# Patient Record
Sex: MALE | Race: WHITE | NOT HISPANIC OR LATINO | Employment: PART TIME | URBAN - METROPOLITAN AREA
[De-identification: names, ages, dates, MRNs, and addresses within clinical notes are randomized per-mention and may not be internally consistent; named-entity substitution may affect disease eponyms.]

---

## 2023-03-08 PROBLEM — M25.511 ARTHRALGIA OF RIGHT ACROMIOCLAVICULAR JOINT: Status: ACTIVE | Noted: 2023-03-08

## 2023-03-08 PROBLEM — E53.8 VITAMIN B12 DEFICIENCY: Status: ACTIVE | Noted: 2023-03-08

## 2023-03-08 PROBLEM — Q65.89 HIP DYSPLASIA (HHS-HCC): Status: ACTIVE | Noted: 2023-03-08

## 2023-03-08 PROBLEM — G82.54: Status: ACTIVE | Noted: 2023-03-08

## 2023-03-08 PROBLEM — J98.4 RESTRICTIVE LUNG DISEASE: Status: ACTIVE | Noted: 2023-03-08

## 2023-03-08 PROBLEM — G82.50: Status: ACTIVE | Noted: 2023-03-08

## 2023-03-08 PROBLEM — M41.40 NEUROMUSCULAR SCOLIOSIS: Status: ACTIVE | Noted: 2023-03-08

## 2023-03-08 PROBLEM — Z98.1 H/O SPINAL FUSION: Status: ACTIVE | Noted: 2023-03-08

## 2023-03-08 PROBLEM — G72.9 CERVICAL MYOPATHY: Status: ACTIVE | Noted: 2023-03-08

## 2023-03-08 PROBLEM — I61.9: Status: ACTIVE | Noted: 2023-03-08

## 2023-03-08 PROBLEM — R94.2 ABNORMAL PFT: Status: ACTIVE | Noted: 2023-03-08

## 2023-03-08 PROBLEM — D64.9 ANEMIA: Status: ACTIVE | Noted: 2023-03-08

## 2023-03-08 PROBLEM — M41.50 SYNDROMIC SCOLIOSIS: Status: ACTIVE | Noted: 2023-03-08

## 2023-03-08 PROBLEM — R05.9 COUGH: Status: ACTIVE | Noted: 2023-03-08

## 2023-03-08 PROBLEM — M24.9 AC JOINT DERANGEMENT: Status: ACTIVE | Noted: 2023-03-08

## 2023-03-08 PROBLEM — G70.9 NEUROMUSCULAR WEAKNESS (MULTI): Status: ACTIVE | Noted: 2023-03-08

## 2023-03-08 PROBLEM — J45.30 ASTHMA, MILD PERSISTENT (HHS-HCC): Status: ACTIVE | Noted: 2023-03-08

## 2023-03-08 PROBLEM — L91.8 FIBROEPITHELIAL PAPILLOMA: Status: ACTIVE | Noted: 2023-03-08

## 2023-03-08 PROBLEM — N31.9 NEUROGENIC BLADDER: Status: ACTIVE | Noted: 2023-03-08

## 2023-03-08 PROBLEM — R60.9 EDEMA: Status: ACTIVE | Noted: 2023-03-08

## 2023-03-08 PROBLEM — Z93.1 S/P NISSEN FUNDOPLICATION (WITH GASTROSTOMY TUBE PLACEMENT) (MULTI): Status: ACTIVE | Noted: 2023-03-08

## 2023-03-08 PROBLEM — Z93.52: Status: ACTIVE | Noted: 2023-03-08

## 2023-03-08 PROBLEM — R60.0 BILATERAL EDEMA OF LOWER EXTREMITY: Status: ACTIVE | Noted: 2023-03-08

## 2023-03-08 PROBLEM — N39.0 RECURRENT UTI: Status: ACTIVE | Noted: 2023-03-08

## 2023-03-08 PROBLEM — K59.2 NEUROGENIC BOWEL: Status: ACTIVE | Noted: 2023-03-08

## 2023-03-08 PROBLEM — B99.9 INTRA-ABDOMINAL INFECTION: Status: ACTIVE | Noted: 2023-03-08

## 2023-03-08 PROBLEM — K21.9 GASTROESOPHAGEAL REFLUX: Status: ACTIVE | Noted: 2023-03-08

## 2023-03-08 PROBLEM — G81.93: Status: ACTIVE | Noted: 2023-03-08

## 2023-03-08 PROBLEM — R25.8 CLONUS: Status: ACTIVE | Noted: 2023-03-08

## 2023-03-08 PROBLEM — G95.89 MYELOMALACIA (MULTI): Status: ACTIVE | Noted: 2023-03-08

## 2023-03-08 PROBLEM — T14.8XXA BRUISING: Status: ACTIVE | Noted: 2023-03-08

## 2023-03-08 PROBLEM — R25.2 SPASTICITY: Status: ACTIVE | Noted: 2023-03-08

## 2023-03-08 PROBLEM — R33.9 URINARY RETENTION: Status: ACTIVE | Noted: 2023-03-08

## 2023-03-08 PROBLEM — E55.9 VITAMIN D DEFICIENCY: Status: ACTIVE | Noted: 2023-03-08

## 2023-03-08 RX ORDER — ALBUTEROL SULFATE 0.83 MG/ML
SOLUTION RESPIRATORY (INHALATION)
COMMUNITY
Start: 2015-01-16 | End: 2024-02-01 | Stop reason: SDUPTHER

## 2023-03-08 RX ORDER — MUPIROCIN 20 MG/G
OINTMENT TOPICAL
COMMUNITY
Start: 2022-10-14 | End: 2023-10-10 | Stop reason: ALTCHOICE

## 2023-03-08 RX ORDER — LANOLIN ALCOHOL/MO/W.PET/CERES
CREAM (GRAM) TOPICAL
COMMUNITY
Start: 2022-06-17

## 2023-03-08 RX ORDER — ACETAMINOPHEN 500 MG
TABLET ORAL
COMMUNITY
Start: 2022-06-17

## 2023-03-08 RX ORDER — MONTELUKAST SODIUM 10 MG/1
1 TABLET ORAL DAILY
COMMUNITY
Start: 2016-04-22 | End: 2024-02-01 | Stop reason: SDUPTHER

## 2023-03-08 RX ORDER — OXYBUTYNIN CHLORIDE 15 MG/1
1 TABLET, EXTENDED RELEASE ORAL DAILY
COMMUNITY
Start: 2017-02-20 | End: 2024-03-26 | Stop reason: SDUPTHER

## 2023-03-08 RX ORDER — DOCUSATE SODIUM 100 MG/1
1 CAPSULE, LIQUID FILLED ORAL DAILY
COMMUNITY
End: 2024-01-08 | Stop reason: SDUPTHER

## 2023-03-08 RX ORDER — BUDESONIDE AND FORMOTEROL FUMARATE DIHYDRATE 160; 4.5 UG/1; UG/1
AEROSOL RESPIRATORY (INHALATION)
COMMUNITY
Start: 2022-05-12

## 2023-03-08 RX ORDER — POLYETHYLENE GLYCOL 3350 17 G/17G
POWDER, FOR SOLUTION ORAL
COMMUNITY
Start: 2018-05-08 | End: 2024-01-08 | Stop reason: SDUPTHER

## 2023-03-08 RX ORDER — FAMOTIDINE 40 MG/1
TABLET, FILM COATED ORAL
COMMUNITY
Start: 2020-02-21 | End: 2023-12-11 | Stop reason: SDUPTHER

## 2023-03-13 ENCOUNTER — APPOINTMENT (OUTPATIENT)
Dept: PRIMARY CARE | Facility: CLINIC | Age: 23
End: 2023-03-13
Payer: COMMERCIAL

## 2023-03-25 LAB — URINE CULTURE: NORMAL

## 2023-04-13 ENCOUNTER — PATIENT OUTREACH (OUTPATIENT)
Dept: CARE COORDINATION | Facility: CLINIC | Age: 23
End: 2023-04-13
Payer: COMMERCIAL

## 2023-04-13 RX ORDER — AMOXICILLIN AND CLAVULANATE POTASSIUM 875; 125 MG/1; MG/1
875 TABLET, FILM COATED ORAL 2 TIMES DAILY
COMMUNITY
End: 2023-10-10 | Stop reason: ALTCHOICE

## 2023-04-13 NOTE — PROGRESS NOTES
Discharge Facility: [facility] [place]  Discharge Diagnosis: abscess of skin  Admission Date: 4/8/2023  Discharge Date: 4/12/2023    PCP appt: Pt declines scheduling at this time.    Engagement  Call Start Time: 1135 (4/13/2023 11:35 AM)    Medications  Medications reviewed with patient/caregiver?: Yes (new prescription reviewed) (4/13/2023 11:35 AM)  Is the patient having any side effects they believe may be caused by any medication additions or changes?: No (4/13/2023 11:35 AM)  Does the patient have all medications ordered at discharge?: Yes (4/13/2023 11:35 AM)  Care Management Interventions: No intervention needed (4/13/2023 11:35 AM)  Is the patient taking all medications as directed (includes completed medication regime)?: Yes (4/13/2023 11:35 AM)    Appointments  Does the patient have a primary care provider?: Yes (4/13/2023 11:35 AM)  Care Management Interventions: Advised patient to make appointment (4/13/2023 11:35 AM)  Has the patient kept scheduled appointments due by today?: Yes (4/13/2023 11:35 AM)  Care Management Interventions: Advised to schedule with specialist (4/13/2023 11:35 AM)    Self Management  Has home health visited the patient within 72 hours of discharge?: Not applicable (4/13/2023 11:35 AM)    Patient Teaching  Does the patient have access to their discharge instructions?: Yes (4/13/2023 11:35 AM)  Care Management Interventions: Reviewed instructions with patient (4/13/2023 11:35 AM)  What is the patient's perception of their health status since discharge?: Improving (4/13/2023 11:35 AM)  Is the patient/caregiver able to teach back the hierarchy of who to call/visit for symptoms/problems? PCP, Specialist, Home Health nurse, Urgent Care, ED, 911: Yes (4/13/2023 11:35 AM)    Wrap Up  Call End Time: 1137 (4/13/2023 11:35 AM)

## 2023-04-19 ENCOUNTER — OFFICE VISIT (OUTPATIENT)
Dept: PRIMARY CARE | Facility: CLINIC | Age: 23
End: 2023-04-19
Payer: COMMERCIAL

## 2023-04-19 VITALS
HEIGHT: 72 IN | BODY MASS INDEX: 26.9 KG/M2 | DIASTOLIC BLOOD PRESSURE: 51 MMHG | OXYGEN SATURATION: 97 % | RESPIRATION RATE: 16 BRPM | TEMPERATURE: 98 F | HEART RATE: 70 BPM | WEIGHT: 198.6 LBS | SYSTOLIC BLOOD PRESSURE: 92 MMHG

## 2023-04-19 DIAGNOSIS — G82.50 QUADRIPLEGIA AND QUADRIPARESIS (MULTI): ICD-10-CM

## 2023-04-19 DIAGNOSIS — G70.9 NEUROMUSCULAR WEAKNESS (MULTI): ICD-10-CM

## 2023-04-19 DIAGNOSIS — B99.9 INTRA-ABDOMINAL INFECTION: ICD-10-CM

## 2023-04-19 DIAGNOSIS — G82.54: ICD-10-CM

## 2023-04-19 DIAGNOSIS — E83.51 HYPOCALCEMIA: ICD-10-CM

## 2023-04-19 DIAGNOSIS — E87.6 HYPOKALEMIA: ICD-10-CM

## 2023-04-19 DIAGNOSIS — A41.9 SEPSIS, DUE TO UNSPECIFIED ORGANISM, UNSPECIFIED WHETHER ACUTE ORGAN DYSFUNCTION PRESENT (MULTI): ICD-10-CM

## 2023-04-19 DIAGNOSIS — N31.9 NEUROGENIC BLADDER: ICD-10-CM

## 2023-04-19 DIAGNOSIS — N39.0 RECURRENT UTI: ICD-10-CM

## 2023-04-19 DIAGNOSIS — E83.42 HYPOMAGNESEMIA: ICD-10-CM

## 2023-04-19 DIAGNOSIS — G80.0 SPASTIC QUADRIPLEGIC CEREBRAL PALSY (MULTI): Primary | ICD-10-CM

## 2023-04-19 DIAGNOSIS — Q65.89 HIP DYSPLASIA (HHS-HCC): ICD-10-CM

## 2023-04-19 PROBLEM — N39.45 URINARY INCONTINENCE WITH CONTINUOUS LEAKAGE: Status: ACTIVE | Noted: 2023-04-19

## 2023-04-19 PROBLEM — K59.09 CHRONIC CONSTIPATION: Status: ACTIVE | Noted: 2023-04-19

## 2023-04-19 PROBLEM — L02.91 ABSCESS: Status: ACTIVE | Noted: 2023-04-19

## 2023-04-19 PROBLEM — N32.81 DETRUSOR HYPERREFLEXIA: Status: ACTIVE | Noted: 2023-04-19

## 2023-04-19 PROCEDURE — 99495 TRANSJ CARE MGMT MOD F2F 14D: CPT | Performed by: INTERNAL MEDICINE

## 2023-04-19 PROCEDURE — 1036F TOBACCO NON-USER: CPT | Performed by: INTERNAL MEDICINE

## 2023-04-19 RX ORDER — AMIKACIN SULFATE 250 MG/ML
15 INJECTION, SOLUTION INTRAMUSCULAR; INTRAVENOUS
COMMUNITY
End: 2023-10-12 | Stop reason: ALTCHOICE

## 2023-04-19 ASSESSMENT — PAIN SCALES - GENERAL: PAINLEVEL: 0-NO PAIN

## 2023-04-19 NOTE — PROGRESS NOTES
Subjective   Jose Fernandez is a 23 y.o. male who presents for Hospital Follow-up.      Admit date: 4.8.23 - 4.12.23  Admission diagnosis: abscess buttox and abdomen  Additional comments: chest xr, abdomen CT, labs and US   Patient states that bilateral ears have been bothering him- says they feel clogged a little   Patient states that he feels great since getting out of hospital     March 6th went to the UTI and sepsis.  Hospitalized for one week.    Sore started around the 6th of April and it popped on the 7th and went to bathroom and shower then on the 8th it was large and down his thigh.  Had chills, and had a temperature of 104.   CT showed a large abdominal abscess and started on vancomycin and mom took him to [place].  Admitted for ultrasound and buttocks had multiple areas of abscess and bedside I & D did not help.  Went to the floor and IR took their time and put him on Vanco.  Two days later the abscess in the stomach was smaller and cultures from the buttocks were Staph.   Switched to Zosyn and went home on Augmentin.    Seen by surgery Monday (17th)and a drain was placed and he seems to be responding to this drainage.  ID is scheduled for Tuesday the 26 th and blood work is pending.  Concern for infected Urinary system, rods/ortho hardware, baclofen pump. Urine changed to pink creamy color and unsure why.          Review of Systems   All other systems reviewed and are negative.      Objective   BP 92/51   Pulse 70   Temp 36.7 °C (98 °F)   Resp 16   Ht 1.829 m (6')   Wt 90.1 kg (198 lb 9.6 oz)   SpO2 97%   BMI 26.94 kg/m²       Physical Exam  Vitals and nursing note reviewed. Exam conducted with a chaperone present.   Constitutional:       Appearance: Normal appearance.      Comments: Examined I his wheelchair.   HENT:      Head: Normocephalic.   Eyes:      Conjunctiva/sclera: Conjunctivae normal.   Cardiovascular:      Rate and Rhythm: Normal rate and regular rhythm.   Pulmonary:      Effort:  Pulmonary effort is normal.      Breath sounds: Normal breath sounds.   Musculoskeletal:      Cervical back: Neck supple.   Skin:     General: Skin is warm and dry.   Neurological:      Mental Status: He is alert.      Motor: Weakness and atrophy present.      Comments: Distal UE and Lower extremities.         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    Relevant Orders    Vitamin D 25-Hydroxy,Total (Completed)    Vitamin K    Vitamin B12 (Completed)    Comprehensive metabolic panel (Completed)    PTH-Related Peptide       Digestive    Intra-abdominal infection       Genitourinary    Neurogenic bladder    Recurrent UTI       Musculoskeletal    Hip dysplasia    Relevant Orders    Vitamin B12 (Completed)    Comprehensive metabolic panel (Completed)       Immune    Sepsis (CMS/HCC)    Relevant Medications    amikacin (Amikin) 1,000 mg/4 mL solution injection    Other Relevant Orders    Vitamin D 25-Hydroxy,Total (Completed)    Vitamin K    Vitamin B12 (Completed)    Comprehensive metabolic panel (Completed)    Immunoglobulins, IgG, IgA, IgM (Completed)     Other Visit Diagnoses       Hypokalemia        Relevant Orders    Vitamin K    Hypomagnesemia        Relevant Orders    Vitamin D 25-Hydroxy,Total (Completed)    Vitamin K    Hypocalcemia        Relevant Orders    Vitamin D 25-Hydroxy,Total (Completed)    Vitamin K    PTH-Related Peptide          Encounter Diagnoses   Name Primary?    Spastic quadriplegic cerebral palsy (CMS/HCC) Yes    Neuromuscular weakness (CMS/HCC)     Hip dysplasia     Quadriplegia and quadriparesis (CMS/HCC)     Hypokalemia      Hypomagnesemia     Hypocalcemia     Sepsis, due to unspecified organism, unspecified whether acute organ dysfunction present (CMS/HCC)     C5-C7 incomplete quadriplegia (CMS/HCC)     Intra-abdominal infection     Neurogenic bladder     Recurrent UTI      Reviewed hospital discharge records for through the inpatient as well as [facility].      Troy Chiang DO

## 2023-04-20 ENCOUNTER — LAB (OUTPATIENT)
Dept: LAB | Facility: LAB | Age: 23
End: 2023-04-20
Payer: COMMERCIAL

## 2023-04-20 DIAGNOSIS — E83.51 HYPOCALCEMIA: ICD-10-CM

## 2023-04-20 DIAGNOSIS — E87.6 HYPOKALEMIA: ICD-10-CM

## 2023-04-20 DIAGNOSIS — G70.9 NEUROMUSCULAR WEAKNESS (MULTI): ICD-10-CM

## 2023-04-20 DIAGNOSIS — Q65.89 HIP DYSPLASIA (HHS-HCC): ICD-10-CM

## 2023-04-20 DIAGNOSIS — G82.50 QUADRIPLEGIA AND QUADRIPARESIS (MULTI): ICD-10-CM

## 2023-04-20 DIAGNOSIS — E83.42 HYPOMAGNESEMIA: ICD-10-CM

## 2023-04-20 DIAGNOSIS — G80.0 SPASTIC QUADRIPLEGIC CEREBRAL PALSY (MULTI): ICD-10-CM

## 2023-04-20 DIAGNOSIS — A41.9 SEPSIS, DUE TO UNSPECIFIED ORGANISM, UNSPECIFIED WHETHER ACUTE ORGAN DYSFUNCTION PRESENT (MULTI): ICD-10-CM

## 2023-04-20 LAB
ALANINE AMINOTRANSFERASE (SGPT) (U/L) IN SER/PLAS: 15 U/L (ref 10–52)
ALBUMIN (G/DL) IN SER/PLAS: 4 G/DL (ref 3.4–5)
ALKALINE PHOSPHATASE (U/L) IN SER/PLAS: 76 U/L (ref 33–120)
ANION GAP IN SER/PLAS: 12 MMOL/L (ref 10–20)
APPEARANCE, URINE: ABNORMAL
ASPARTATE AMINOTRANSFERASE (SGOT) (U/L) IN SER/PLAS: 15 U/L (ref 9–39)
BACTERIA, URINE: ABNORMAL /HPF
BASOPHILS (10*3/UL) IN BLOOD BY AUTOMATED COUNT: 0.06 X10E9/L (ref 0–0.1)
BASOPHILS/100 LEUKOCYTES IN BLOOD BY AUTOMATED COUNT: 0.8 % (ref 0–2)
BILIRUBIN TOTAL (MG/DL) IN SER/PLAS: 0.7 MG/DL (ref 0–1.2)
BILIRUBIN, URINE: NEGATIVE
BLOOD, URINE: NEGATIVE
C REACTIVE PROTEIN (MG/L) IN SER/PLAS: 0.69 MG/DL
CALCIDIOL (25 OH VITAMIN D3) (NG/ML) IN SER/PLAS: 30 NG/ML
CALCIUM (MG/DL) IN SER/PLAS: 9.2 MG/DL (ref 8.6–10.3)
CARBON DIOXIDE, TOTAL (MMOL/L) IN SER/PLAS: 29 MMOL/L (ref 21–32)
CHLORIDE (MMOL/L) IN SER/PLAS: 103 MMOL/L (ref 98–107)
COBALAMIN (VITAMIN B12) (PG/ML) IN SER/PLAS: 325 PG/ML (ref 211–911)
COLOR, URINE: YELLOW
CREATININE (MG/DL) IN SER/PLAS: 0.54 MG/DL (ref 0.5–1.3)
EOSINOPHILS (10*3/UL) IN BLOOD BY AUTOMATED COUNT: 0.18 X10E9/L (ref 0–0.7)
EOSINOPHILS/100 LEUKOCYTES IN BLOOD BY AUTOMATED COUNT: 2.4 % (ref 0–6)
ERYTHROCYTE DISTRIBUTION WIDTH (RATIO) BY AUTOMATED COUNT: 13.7 % (ref 11.5–14.5)
ERYTHROCYTE MEAN CORPUSCULAR HEMOGLOBIN CONCENTRATION (G/DL) BY AUTOMATED: 31.6 G/DL (ref 32–36)
ERYTHROCYTE MEAN CORPUSCULAR VOLUME (FL) BY AUTOMATED COUNT: 92 FL (ref 80–100)
ERYTHROCYTES (10*6/UL) IN BLOOD BY AUTOMATED COUNT: 4.5 X10E12/L (ref 4.5–5.9)
GFR MALE: >90 ML/MIN/1.73M2
GLUCOSE (MG/DL) IN SER/PLAS: 77 MG/DL (ref 74–99)
GLUCOSE, URINE: NEGATIVE MG/DL
HEMATOCRIT (%) IN BLOOD BY AUTOMATED COUNT: 41.5 % (ref 41–52)
HEMOGLOBIN (G/DL) IN BLOOD: 13.1 G/DL (ref 13.5–17.5)
IGA (MG/DL) IN SER/PLAS: 211 MG/DL (ref 70–400)
IGG (MG/DL) IN SER/PLAS: 1090 MG/DL (ref 700–1600)
IGM (MG/DL) IN SER/PLAS: 127 MG/DL (ref 40–230)
IMMATURE GRANULOCYTES/100 LEUKOCYTES IN BLOOD BY AUTOMATED COUNT: 0.9 % (ref 0–0.9)
KETONES, URINE: NEGATIVE MG/DL
LEUKOCYTE ESTERASE, URINE: ABNORMAL
LEUKOCYTES (10*3/UL) IN BLOOD BY AUTOMATED COUNT: 7.7 X10E9/L (ref 4.4–11.3)
LYMPHOCYTES (10*3/UL) IN BLOOD BY AUTOMATED COUNT: 2.06 X10E9/L (ref 1.2–4.8)
LYMPHOCYTES/100 LEUKOCYTES IN BLOOD BY AUTOMATED COUNT: 26.9 % (ref 13–44)
MONOCYTES (10*3/UL) IN BLOOD BY AUTOMATED COUNT: 0.52 X10E9/L (ref 0.1–1)
MONOCYTES/100 LEUKOCYTES IN BLOOD BY AUTOMATED COUNT: 6.8 % (ref 2–10)
MUCUS, URINE: ABNORMAL /LPF
NEUTROPHILS (10*3/UL) IN BLOOD BY AUTOMATED COUNT: 4.76 X10E9/L (ref 1.2–7.7)
NEUTROPHILS/100 LEUKOCYTES IN BLOOD BY AUTOMATED COUNT: 62.2 % (ref 40–80)
NITRITE, URINE: NEGATIVE
PH, URINE: 5 (ref 5–8)
PLATELETS (10*3/UL) IN BLOOD AUTOMATED COUNT: 424 X10E9/L (ref 150–450)
POTASSIUM (MMOL/L) IN SER/PLAS: 4.3 MMOL/L (ref 3.5–5.3)
PROTEIN TOTAL: 6.8 G/DL (ref 6.4–8.2)
PROTEIN, URINE: NEGATIVE MG/DL
RBC, URINE: 2 /HPF (ref 0–5)
SEDIMENTATION RATE, ERYTHROCYTE: 61 MM/H (ref 0–15)
SODIUM (MMOL/L) IN SER/PLAS: 140 MMOL/L (ref 136–145)
SPECIFIC GRAVITY, URINE: 1.01 (ref 1–1.03)
SQUAMOUS EPITHELIAL CELLS, URINE: 1 /HPF
UREA NITROGEN (MG/DL) IN SER/PLAS: 9 MG/DL (ref 6–23)
UROBILINOGEN, URINE: <2 MG/DL (ref 0–1.9)
WBC CLUMPS, URINE: ABNORMAL /HPF
WBC, URINE: 20 /HPF (ref 0–5)

## 2023-04-20 PROCEDURE — 82397 CHEMILUMINESCENT ASSAY: CPT

## 2023-04-20 PROCEDURE — 82306 VITAMIN D 25 HYDROXY: CPT

## 2023-04-20 PROCEDURE — 84597 ASSAY OF VITAMIN K: CPT

## 2023-04-20 PROCEDURE — 80053 COMPREHEN METABOLIC PANEL: CPT

## 2023-04-20 PROCEDURE — 36415 COLL VENOUS BLD VENIPUNCTURE: CPT

## 2023-04-20 PROCEDURE — 82784 ASSAY IGA/IGD/IGG/IGM EACH: CPT

## 2023-04-20 PROCEDURE — 82607 VITAMIN B-12: CPT

## 2023-04-21 LAB — URINE CULTURE: ABNORMAL

## 2023-04-24 ENCOUNTER — PATIENT OUTREACH (OUTPATIENT)
Dept: CARE COORDINATION | Facility: CLINIC | Age: 23
End: 2023-04-24
Payer: COMMERCIAL

## 2023-04-24 PROBLEM — A41.9 SEPSIS (MULTI): Status: ACTIVE | Noted: 2023-04-24

## 2023-04-24 NOTE — PROGRESS NOTES
Call regarding appt. with PCP on 4/19/2023 after hospitalization. Spoke with pt mother Lakesha.  At time of outreach call the patient feels as if their condition has improved since last visit. Per Lakesha pt has a UTI and is on another antibiotic at this time.  No other issues or concerns. Pt is to see infectious disease doctor tomorrow.

## 2023-04-26 LAB — PTH-RELATED PEPTIDE, PLASMA: 0.5 PMOL/L

## 2023-04-28 LAB — VITAMIN K: 1.06 NMOL/L (ref 0.22–4.88)

## 2023-05-04 ENCOUNTER — APPOINTMENT (OUTPATIENT)
Dept: PRIMARY CARE | Facility: CLINIC | Age: 23
End: 2023-05-04
Payer: COMMERCIAL

## 2023-05-04 LAB
BASOPHILS (10*3/UL) IN BLOOD BY AUTOMATED COUNT: 0.05 X10E9/L (ref 0–0.1)
BASOPHILS/100 LEUKOCYTES IN BLOOD BY AUTOMATED COUNT: 0.5 % (ref 0–2)
C REACTIVE PROTEIN (MG/L) IN SER/PLAS: 12.52 MG/DL
EOSINOPHILS (10*3/UL) IN BLOOD BY AUTOMATED COUNT: 0.21 X10E9/L (ref 0–0.7)
EOSINOPHILS/100 LEUKOCYTES IN BLOOD BY AUTOMATED COUNT: 1.9 % (ref 0–6)
ERYTHROCYTE DISTRIBUTION WIDTH (RATIO) BY AUTOMATED COUNT: 13.5 % (ref 11.5–14.5)
ERYTHROCYTE MEAN CORPUSCULAR HEMOGLOBIN CONCENTRATION (G/DL) BY AUTOMATED: 32.5 G/DL (ref 32–36)
ERYTHROCYTE MEAN CORPUSCULAR VOLUME (FL) BY AUTOMATED COUNT: 89 FL (ref 80–100)
ERYTHROCYTES (10*6/UL) IN BLOOD BY AUTOMATED COUNT: 4.47 X10E12/L (ref 4.5–5.9)
HEMATOCRIT (%) IN BLOOD BY AUTOMATED COUNT: 40 % (ref 41–52)
HEMOGLOBIN (G/DL) IN BLOOD: 13 G/DL (ref 13.5–17.5)
IMMATURE GRANULOCYTES/100 LEUKOCYTES IN BLOOD BY AUTOMATED COUNT: 0.6 % (ref 0–0.9)
LEUKOCYTES (10*3/UL) IN BLOOD BY AUTOMATED COUNT: 10.8 X10E9/L (ref 4.4–11.3)
LYMPHOCYTES (10*3/UL) IN BLOOD BY AUTOMATED COUNT: 2.02 X10E9/L (ref 1.2–4.8)
LYMPHOCYTES/100 LEUKOCYTES IN BLOOD BY AUTOMATED COUNT: 18.7 % (ref 13–44)
MONOCYTES (10*3/UL) IN BLOOD BY AUTOMATED COUNT: 0.96 X10E9/L (ref 0.1–1)
MONOCYTES/100 LEUKOCYTES IN BLOOD BY AUTOMATED COUNT: 8.9 % (ref 2–10)
NEUTROPHILS (10*3/UL) IN BLOOD BY AUTOMATED COUNT: 7.53 X10E9/L (ref 1.2–7.7)
NEUTROPHILS/100 LEUKOCYTES IN BLOOD BY AUTOMATED COUNT: 69.4 % (ref 40–80)
PLATELETS (10*3/UL) IN BLOOD AUTOMATED COUNT: 268 X10E9/L (ref 150–450)
SEDIMENTATION RATE, ERYTHROCYTE: 45 MM/H (ref 0–15)

## 2023-05-08 LAB
BASOPHILS (10*3/UL) IN BLOOD BY AUTOMATED COUNT: 0.04 X10E9/L (ref 0–0.1)
BASOPHILS/100 LEUKOCYTES IN BLOOD BY AUTOMATED COUNT: 0.8 % (ref 0–2)
C REACTIVE PROTEIN (MG/L) IN SER/PLAS: 1.5 MG/DL
EOSINOPHILS (10*3/UL) IN BLOOD BY AUTOMATED COUNT: 0.16 X10E9/L (ref 0–0.7)
EOSINOPHILS/100 LEUKOCYTES IN BLOOD BY AUTOMATED COUNT: 3.1 % (ref 0–6)
ERYTHROCYTE DISTRIBUTION WIDTH (RATIO) BY AUTOMATED COUNT: 13.3 % (ref 11.5–14.5)
ERYTHROCYTE MEAN CORPUSCULAR HEMOGLOBIN CONCENTRATION (G/DL) BY AUTOMATED: 32.4 G/DL (ref 32–36)
ERYTHROCYTE MEAN CORPUSCULAR VOLUME (FL) BY AUTOMATED COUNT: 89 FL (ref 80–100)
ERYTHROCYTES (10*6/UL) IN BLOOD BY AUTOMATED COUNT: 4.6 X10E12/L (ref 4.5–5.9)
HEMATOCRIT (%) IN BLOOD BY AUTOMATED COUNT: 41.1 % (ref 41–52)
HEMOGLOBIN (G/DL) IN BLOOD: 13.3 G/DL (ref 13.5–17.5)
IMMATURE GRANULOCYTES/100 LEUKOCYTES IN BLOOD BY AUTOMATED COUNT: 0.6 % (ref 0–0.9)
LEUKOCYTES (10*3/UL) IN BLOOD BY AUTOMATED COUNT: 5.2 X10E9/L (ref 4.4–11.3)
LYMPHOCYTES (10*3/UL) IN BLOOD BY AUTOMATED COUNT: 1.75 X10E9/L (ref 1.2–4.8)
LYMPHOCYTES/100 LEUKOCYTES IN BLOOD BY AUTOMATED COUNT: 33.9 % (ref 13–44)
MONOCYTES (10*3/UL) IN BLOOD BY AUTOMATED COUNT: 0.44 X10E9/L (ref 0.1–1)
MONOCYTES/100 LEUKOCYTES IN BLOOD BY AUTOMATED COUNT: 8.5 % (ref 2–10)
NEUTROPHILS (10*3/UL) IN BLOOD BY AUTOMATED COUNT: 2.74 X10E9/L (ref 1.2–7.7)
NEUTROPHILS/100 LEUKOCYTES IN BLOOD BY AUTOMATED COUNT: 53.1 % (ref 40–80)
PLATELETS (10*3/UL) IN BLOOD AUTOMATED COUNT: 335 X10E9/L (ref 150–450)
SEDIMENTATION RATE, ERYTHROCYTE: 37 MM/H (ref 0–15)

## 2023-05-23 ENCOUNTER — PATIENT OUTREACH (OUTPATIENT)
Dept: CARE COORDINATION | Facility: CLINIC | Age: 23
End: 2023-05-23
Payer: COMMERCIAL

## 2023-05-23 LAB
APPEARANCE, URINE: ABNORMAL
BACTERIA, URINE: ABNORMAL /HPF
BILIRUBIN, URINE: NEGATIVE
BLOOD, URINE: NEGATIVE
COLOR, URINE: YELLOW
GLUCOSE, URINE: NEGATIVE MG/DL
KETONES, URINE: NEGATIVE MG/DL
LEUKOCYTE ESTERASE, URINE: ABNORMAL
NITRITE, URINE: NEGATIVE
PH, URINE: 5 (ref 5–8)
PROTEIN, URINE: NEGATIVE MG/DL
RBC, URINE: 3 /HPF (ref 0–5)
SPECIFIC GRAVITY, URINE: 1.02 (ref 1–1.03)
SQUAMOUS EPITHELIAL CELLS, URINE: 8 /HPF
UROBILINOGEN, URINE: <2 MG/DL (ref 0–1.9)
WBC, URINE: >182 /HPF (ref 0–5)

## 2023-05-23 NOTE — PROGRESS NOTES
Unable to reach patient for one month post discharge follow up call.   Left voicemail with call back number for patient to call if needed

## 2023-05-25 LAB — URINE CULTURE: ABNORMAL

## 2023-06-05 LAB
BASOPHILS (10*3/UL) IN BLOOD BY AUTOMATED COUNT: 0.04 X10E9/L (ref 0–0.1)
BASOPHILS/100 LEUKOCYTES IN BLOOD BY AUTOMATED COUNT: 0.4 % (ref 0–2)
C REACTIVE PROTEIN (MG/L) IN SER/PLAS: 3.99 MG/DL
EOSINOPHILS (10*3/UL) IN BLOOD BY AUTOMATED COUNT: 0.32 X10E9/L (ref 0–0.7)
EOSINOPHILS/100 LEUKOCYTES IN BLOOD BY AUTOMATED COUNT: 3.6 % (ref 0–6)
ERYTHROCYTE DISTRIBUTION WIDTH (RATIO) BY AUTOMATED COUNT: 14.3 % (ref 11.5–14.5)
ERYTHROCYTE MEAN CORPUSCULAR HEMOGLOBIN CONCENTRATION (G/DL) BY AUTOMATED: 32.6 G/DL (ref 32–36)
ERYTHROCYTE MEAN CORPUSCULAR VOLUME (FL) BY AUTOMATED COUNT: 90 FL (ref 80–100)
ERYTHROCYTES (10*6/UL) IN BLOOD BY AUTOMATED COUNT: 4.77 X10E12/L (ref 4.5–5.9)
HEMATOCRIT (%) IN BLOOD BY AUTOMATED COUNT: 43 % (ref 41–52)
HEMOGLOBIN (G/DL) IN BLOOD: 14 G/DL (ref 13.5–17.5)
IMMATURE GRANULOCYTES/100 LEUKOCYTES IN BLOOD BY AUTOMATED COUNT: 0.4 % (ref 0–0.9)
LEUKOCYTES (10*3/UL) IN BLOOD BY AUTOMATED COUNT: 9 X10E9/L (ref 4.4–11.3)
LYMPHOCYTES (10*3/UL) IN BLOOD BY AUTOMATED COUNT: 1.16 X10E9/L (ref 1.2–4.8)
LYMPHOCYTES/100 LEUKOCYTES IN BLOOD BY AUTOMATED COUNT: 13 % (ref 13–44)
MONOCYTES (10*3/UL) IN BLOOD BY AUTOMATED COUNT: 0.62 X10E9/L (ref 0.1–1)
MONOCYTES/100 LEUKOCYTES IN BLOOD BY AUTOMATED COUNT: 6.9 % (ref 2–10)
NEUTROPHILS (10*3/UL) IN BLOOD BY AUTOMATED COUNT: 6.77 X10E9/L (ref 1.2–7.7)
NEUTROPHILS/100 LEUKOCYTES IN BLOOD BY AUTOMATED COUNT: 75.7 % (ref 40–80)
PLATELETS (10*3/UL) IN BLOOD AUTOMATED COUNT: 279 X10E9/L (ref 150–450)
SEDIMENTATION RATE, ERYTHROCYTE: 31 MM/H (ref 0–15)

## 2023-06-19 LAB
BASOPHILS (10*3/UL) IN BLOOD BY AUTOMATED COUNT: 0.05 X10E9/L (ref 0–0.1)
BASOPHILS/100 LEUKOCYTES IN BLOOD BY AUTOMATED COUNT: 0.6 % (ref 0–2)
C REACTIVE PROTEIN (MG/L) IN SER/PLAS BY HIGH SENSIT: 13 MG/L
EOSINOPHILS (10*3/UL) IN BLOOD BY AUTOMATED COUNT: 0.08 X10E9/L (ref 0–0.7)
EOSINOPHILS/100 LEUKOCYTES IN BLOOD BY AUTOMATED COUNT: 1 % (ref 0–6)
ERYTHROCYTE DISTRIBUTION WIDTH (RATIO) BY AUTOMATED COUNT: 13.8 % (ref 11.5–14.5)
ERYTHROCYTE MEAN CORPUSCULAR HEMOGLOBIN CONCENTRATION (G/DL) BY AUTOMATED: 32.3 G/DL (ref 32–36)
ERYTHROCYTE MEAN CORPUSCULAR VOLUME (FL) BY AUTOMATED COUNT: 89 FL (ref 80–100)
ERYTHROCYTES (10*6/UL) IN BLOOD BY AUTOMATED COUNT: 4.92 X10E12/L (ref 4.5–5.9)
HEMATOCRIT (%) IN BLOOD BY AUTOMATED COUNT: 43.7 % (ref 41–52)
HEMOGLOBIN (G/DL) IN BLOOD: 14.1 G/DL (ref 13.5–17.5)
IMMATURE GRANULOCYTES/100 LEUKOCYTES IN BLOOD BY AUTOMATED COUNT: 0.2 % (ref 0–0.9)
LEUKOCYTES (10*3/UL) IN BLOOD BY AUTOMATED COUNT: 8.4 X10E9/L (ref 4.4–11.3)
LYMPHOCYTES (10*3/UL) IN BLOOD BY AUTOMATED COUNT: 1.43 X10E9/L (ref 1.2–4.8)
LYMPHOCYTES/100 LEUKOCYTES IN BLOOD BY AUTOMATED COUNT: 17 % (ref 13–44)
MONOCYTES (10*3/UL) IN BLOOD BY AUTOMATED COUNT: 0.62 X10E9/L (ref 0.1–1)
MONOCYTES/100 LEUKOCYTES IN BLOOD BY AUTOMATED COUNT: 7.4 % (ref 2–10)
NEUTROPHILS (10*3/UL) IN BLOOD BY AUTOMATED COUNT: 6.2 X10E9/L (ref 1.2–7.7)
NEUTROPHILS/100 LEUKOCYTES IN BLOOD BY AUTOMATED COUNT: 73.8 % (ref 40–80)
PLATELETS (10*3/UL) IN BLOOD AUTOMATED COUNT: 299 X10E9/L (ref 150–450)
SEDIMENTATION RATE, ERYTHROCYTE: 18 MM/H (ref 0–15)

## 2023-06-23 ENCOUNTER — PATIENT OUTREACH (OUTPATIENT)
Dept: CARE COORDINATION | Facility: CLINIC | Age: 23
End: 2023-06-23
Payer: COMMERCIAL

## 2023-06-23 NOTE — PROGRESS NOTES
60 day phone call complete. Spoke with pt mother Lakesha. Per Lakesha pt is doing well, they are just trying to figure out why the stomach infection keeps coming back. Per Lakesha the pediatric urology group Jose has been seeing is leaving the practice. Lakesha states there are no issues with Jose's medications and that they have everything they need at home. Lakesha denies any questions for pcp.  Lakesha reports no questions, concerns or needs at this time.   Lakesha encouraged to call if questions arise.

## 2023-08-14 ENCOUNTER — DOCUMENTATION (OUTPATIENT)
Dept: CARE COORDINATION | Facility: CLINIC | Age: 23
End: 2023-08-14
Payer: COMMERCIAL

## 2023-08-15 ENCOUNTER — PATIENT OUTREACH (OUTPATIENT)
Dept: CARE COORDINATION | Facility: CLINIC | Age: 23
End: 2023-08-15
Payer: COMMERCIAL

## 2023-08-15 RX ORDER — CEFPODOXIME PROXETIL 200 MG/1
200 TABLET, FILM COATED ORAL 2 TIMES DAILY
COMMUNITY
End: 2023-10-10 | Stop reason: ALTCHOICE

## 2023-08-15 RX ORDER — METRONIDAZOLE 500 MG/1
500 TABLET ORAL 3 TIMES DAILY
COMMUNITY
End: 2023-10-10 | Stop reason: ALTCHOICE

## 2023-08-15 NOTE — PROGRESS NOTES
Discharge Facility: [facility]  Discharge Diagnosis: Injury of bladder  Admission Date: 8/7/2023  Discharge Date: 8/12/2023    PCP Appointment Date: Pt mother Lakesha states she will call to schedule PCP follow up after pt sees specialists    Specialist Appointment Date:   -Urology; Dr. Vargas 9/6/2023    Hospital Encounter and Summary: Not available at this time    See discharge assessment below for further details    Engagement  Call Start Time: 1138 (8/15/2023 11:39 AM)    Medications  Medications reviewed with patient/caregiver?: Yes (new prescriptions reviewed- flagyl and cefpodoxime) (8/15/2023 11:39 AM)  Is the patient having any side effects they believe may be caused by any medication additions or changes?: No (8/15/2023 11:39 AM)  Does the patient have all medications ordered at discharge?: Yes (8/15/2023 11:39 AM)  Care Management Interventions: No intervention needed (8/15/2023 11:39 AM)  Is the patient taking all medications as directed (includes completed medication regime)?: Yes (8/15/2023 11:39 AM)    Appointments  Does the patient have a primary care provider?: Yes (8/15/2023 11:39 AM)  Has the patient kept scheduled appointments due by today?: Yes (8/15/2023 11:39 AM)    Self Management  Has home health visited the patient within 72 hours of discharge?: Not applicable (8/15/2023 11:39 AM)    Patient Teaching  Does the patient have access to their discharge instructions?: Yes (8/15/2023 11:39 AM)    Wrap Up  Call End Time: 1140 (8/15/2023 11:39 AM)

## 2023-08-22 LAB
ALANINE AMINOTRANSFERASE (SGPT) (U/L) IN SER/PLAS: 14 U/L (ref 10–52)
ALBUMIN (G/DL) IN SER/PLAS: 3.8 G/DL (ref 3.4–5)
ALKALINE PHOSPHATASE (U/L) IN SER/PLAS: 54 U/L (ref 33–120)
ANION GAP IN SER/PLAS: 14 MMOL/L (ref 10–20)
ASPARTATE AMINOTRANSFERASE (SGOT) (U/L) IN SER/PLAS: 17 U/L (ref 9–39)
BASOPHILS (10*3/UL) IN BLOOD BY AUTOMATED COUNT: 0.05 X10E9/L (ref 0–0.1)
BASOPHILS/100 LEUKOCYTES IN BLOOD BY AUTOMATED COUNT: 0.6 % (ref 0–2)
BILIRUBIN TOTAL (MG/DL) IN SER/PLAS: 0.4 MG/DL (ref 0–1.2)
C REACTIVE PROTEIN (MG/L) IN SER/PLAS: 0.76 MG/DL
CALCIUM (MG/DL) IN SER/PLAS: 8.7 MG/DL (ref 8.6–10.3)
CARBON DIOXIDE, TOTAL (MMOL/L) IN SER/PLAS: 25 MMOL/L (ref 21–32)
CHLORIDE (MMOL/L) IN SER/PLAS: 103 MMOL/L (ref 98–107)
CREATININE (MG/DL) IN SER/PLAS: 0.55 MG/DL (ref 0.5–1.3)
EOSINOPHILS (10*3/UL) IN BLOOD BY AUTOMATED COUNT: 0.12 X10E9/L (ref 0–0.7)
EOSINOPHILS/100 LEUKOCYTES IN BLOOD BY AUTOMATED COUNT: 1.5 % (ref 0–6)
ERYTHROCYTE DISTRIBUTION WIDTH (RATIO) BY AUTOMATED COUNT: 13.3 % (ref 11.5–14.5)
ERYTHROCYTE MEAN CORPUSCULAR HEMOGLOBIN CONCENTRATION (G/DL) BY AUTOMATED: 32.9 G/DL (ref 32–36)
ERYTHROCYTE MEAN CORPUSCULAR VOLUME (FL) BY AUTOMATED COUNT: 89 FL (ref 80–100)
ERYTHROCYTES (10*6/UL) IN BLOOD BY AUTOMATED COUNT: 4.59 X10E12/L (ref 4.5–5.9)
GFR MALE: >90 ML/MIN/1.73M2
GLUCOSE (MG/DL) IN SER/PLAS: 96 MG/DL (ref 74–99)
HEMATOCRIT (%) IN BLOOD BY AUTOMATED COUNT: 40.7 % (ref 41–52)
HEMOGLOBIN (G/DL) IN BLOOD: 13.4 G/DL (ref 13.5–17.5)
IMMATURE GRANULOCYTES/100 LEUKOCYTES IN BLOOD BY AUTOMATED COUNT: 0.6 % (ref 0–0.9)
LEUKOCYTES (10*3/UL) IN BLOOD BY AUTOMATED COUNT: 8 X10E9/L (ref 4.4–11.3)
LYMPHOCYTES (10*3/UL) IN BLOOD BY AUTOMATED COUNT: 2.22 X10E9/L (ref 1.2–4.8)
LYMPHOCYTES/100 LEUKOCYTES IN BLOOD BY AUTOMATED COUNT: 27.8 % (ref 13–44)
MONOCYTES (10*3/UL) IN BLOOD BY AUTOMATED COUNT: 0.77 X10E9/L (ref 0.1–1)
MONOCYTES/100 LEUKOCYTES IN BLOOD BY AUTOMATED COUNT: 9.6 % (ref 2–10)
NEUTROPHILS (10*3/UL) IN BLOOD BY AUTOMATED COUNT: 4.78 X10E9/L (ref 1.2–7.7)
NEUTROPHILS/100 LEUKOCYTES IN BLOOD BY AUTOMATED COUNT: 59.9 % (ref 40–80)
PLATELETS (10*3/UL) IN BLOOD AUTOMATED COUNT: 611 X10E9/L (ref 150–450)
POTASSIUM (MMOL/L) IN SER/PLAS: 3.5 MMOL/L (ref 3.5–5.3)
PROTEIN TOTAL: 6.9 G/DL (ref 6.4–8.2)
SEDIMENTATION RATE, ERYTHROCYTE: 41 MM/H (ref 0–15)
SODIUM (MMOL/L) IN SER/PLAS: 138 MMOL/L (ref 136–145)
UREA NITROGEN (MG/DL) IN SER/PLAS: 6 MG/DL (ref 6–23)

## 2023-09-13 LAB
BASOPHILS (10*3/UL) IN BLOOD BY AUTOMATED COUNT: 0.05 X10E9/L (ref 0–0.1)
BASOPHILS/100 LEUKOCYTES IN BLOOD BY AUTOMATED COUNT: 0.6 % (ref 0–2)
C REACTIVE PROTEIN (MG/L) IN SER/PLAS: 0.57 MG/DL
EOSINOPHILS (10*3/UL) IN BLOOD BY AUTOMATED COUNT: 0.26 X10E9/L (ref 0–0.7)
EOSINOPHILS/100 LEUKOCYTES IN BLOOD BY AUTOMATED COUNT: 3.4 % (ref 0–6)
ERYTHROCYTE DISTRIBUTION WIDTH (RATIO) BY AUTOMATED COUNT: 13.9 % (ref 11.5–14.5)
ERYTHROCYTE MEAN CORPUSCULAR HEMOGLOBIN CONCENTRATION (G/DL) BY AUTOMATED: 33.3 G/DL (ref 32–36)
ERYTHROCYTE MEAN CORPUSCULAR VOLUME (FL) BY AUTOMATED COUNT: 89 FL (ref 80–100)
ERYTHROCYTES (10*6/UL) IN BLOOD BY AUTOMATED COUNT: 4.69 X10E12/L (ref 4.5–5.9)
HEMATOCRIT (%) IN BLOOD BY AUTOMATED COUNT: 41.7 % (ref 41–52)
HEMOGLOBIN (G/DL) IN BLOOD: 13.9 G/DL (ref 13.5–17.5)
IMMATURE GRANULOCYTES/100 LEUKOCYTES IN BLOOD BY AUTOMATED COUNT: 0.4 % (ref 0–0.9)
LEUKOCYTES (10*3/UL) IN BLOOD BY AUTOMATED COUNT: 7.7 X10E9/L (ref 4.4–11.3)
LYMPHOCYTES (10*3/UL) IN BLOOD BY AUTOMATED COUNT: 1.42 X10E9/L (ref 1.2–4.8)
LYMPHOCYTES/100 LEUKOCYTES IN BLOOD BY AUTOMATED COUNT: 18.4 % (ref 13–44)
MONOCYTES (10*3/UL) IN BLOOD BY AUTOMATED COUNT: 0.65 X10E9/L (ref 0.1–1)
MONOCYTES/100 LEUKOCYTES IN BLOOD BY AUTOMATED COUNT: 8.4 % (ref 2–10)
NEUTROPHILS (10*3/UL) IN BLOOD BY AUTOMATED COUNT: 5.32 X10E9/L (ref 1.2–7.7)
NEUTROPHILS/100 LEUKOCYTES IN BLOOD BY AUTOMATED COUNT: 68.8 % (ref 40–80)
PLATELETS (10*3/UL) IN BLOOD AUTOMATED COUNT: 284 X10E9/L (ref 150–450)
SEDIMENTATION RATE, ERYTHROCYTE: 12 MM/H (ref 0–15)

## 2023-10-02 ENCOUNTER — TELEPHONE (OUTPATIENT)
Dept: INFECTIOUS DISEASES | Facility: CLINIC | Age: 23
End: 2023-10-02
Payer: COMMERCIAL

## 2023-10-02 DIAGNOSIS — B99.9 INTRA-ABDOMINAL INFECTION: Primary | ICD-10-CM

## 2023-10-02 NOTE — TELEPHONE ENCOUNTER
Mom reports Jose seems to be more twitchy and tired.  No fevers and otherwise feels fine.  Given his history of retained fluid and repeat infections mom and Jose would feel better getting another round of labs.  I will place orders today.

## 2023-10-04 ENCOUNTER — LAB (OUTPATIENT)
Dept: LAB | Facility: LAB | Age: 23
End: 2023-10-04
Payer: COMMERCIAL

## 2023-10-04 DIAGNOSIS — B99.9 INTRA-ABDOMINAL INFECTION: ICD-10-CM

## 2023-10-04 LAB
BASOPHILS # BLD AUTO: 0.05 X10*3/UL (ref 0–0.1)
BASOPHILS NFR BLD AUTO: 0.6 %
CRP SERPL-MCNC: 0.51 MG/DL
EOSINOPHIL # BLD AUTO: 0.11 X10*3/UL (ref 0–0.7)
EOSINOPHIL NFR BLD AUTO: 1.3 %
ERYTHROCYTE [DISTWIDTH] IN BLOOD BY AUTOMATED COUNT: 12.9 % (ref 11.5–14.5)
ERYTHROCYTE [SEDIMENTATION RATE] IN BLOOD BY WESTERGREN METHOD: 16 MM/H (ref 0–15)
HCT VFR BLD AUTO: 44.1 % (ref 41–52)
HGB BLD-MCNC: 14.8 G/DL (ref 13.5–17.5)
IMM GRANULOCYTES # BLD AUTO: 0.04 X10*3/UL (ref 0–0.7)
IMM GRANULOCYTES NFR BLD AUTO: 0.5 % (ref 0–0.9)
LYMPHOCYTES # BLD AUTO: 1.82 X10*3/UL (ref 1.2–4.8)
LYMPHOCYTES NFR BLD AUTO: 21.3 %
MCH RBC QN AUTO: 30.3 PG (ref 26–34)
MCHC RBC AUTO-ENTMCNC: 33.6 G/DL (ref 32–36)
MCV RBC AUTO: 90 FL (ref 80–100)
MONOCYTES # BLD AUTO: 0.43 X10*3/UL (ref 0.1–1)
MONOCYTES NFR BLD AUTO: 5 %
NEUTROPHILS # BLD AUTO: 6.1 X10*3/UL (ref 1.2–7.7)
NEUTROPHILS NFR BLD AUTO: 71.3 %
NRBC BLD-RTO: 0 /100 WBCS (ref 0–0)
PLATELET # BLD AUTO: 307 X10*3/UL (ref 150–450)
PMV BLD AUTO: 10.9 FL (ref 7.5–11.5)
RBC # BLD AUTO: 4.89 X10*6/UL (ref 4.5–5.9)
WBC # BLD AUTO: 8.6 X10*3/UL (ref 4.4–11.3)

## 2023-10-04 PROCEDURE — 36415 COLL VENOUS BLD VENIPUNCTURE: CPT

## 2023-10-05 DIAGNOSIS — N39.0 RECURRENT UTI: Primary | ICD-10-CM

## 2023-10-05 RX ORDER — CEFAZOLIN SODIUM 2 G/100ML
2 INJECTION, SOLUTION INTRAVENOUS EVERY 8 HOURS
Status: CANCELLED | OUTPATIENT
Start: 2023-10-05

## 2023-10-06 ENCOUNTER — LAB (OUTPATIENT)
Dept: LAB | Facility: LAB | Age: 23
End: 2023-10-06
Payer: COMMERCIAL

## 2023-10-06 ENCOUNTER — PREP FOR PROCEDURE (OUTPATIENT)
Dept: UROLOGY | Facility: HOSPITAL | Age: 23
End: 2023-10-06

## 2023-10-06 DIAGNOSIS — N39.0 RECURRENT UTI: ICD-10-CM

## 2023-10-06 PROCEDURE — 87186 SC STD MICRODIL/AGAR DIL: CPT

## 2023-10-06 PROCEDURE — 87086 URINE CULTURE/COLONY COUNT: CPT

## 2023-10-08 LAB — BACTERIA UR CULT: ABNORMAL

## 2023-10-10 ENCOUNTER — PRE-ADMISSION TESTING (OUTPATIENT)
Dept: PREADMISSION TESTING | Facility: HOSPITAL | Age: 23
End: 2023-10-10
Payer: COMMERCIAL

## 2023-10-10 RX ORDER — AMOXICILLIN AND CLAVULANATE POTASSIUM 875; 125 MG/1; MG/1
1 TABLET, FILM COATED ORAL 2 TIMES DAILY
Qty: 24 TABLET | Refills: 0 | Status: SHIPPED | OUTPATIENT
Start: 2023-10-10 | End: 2023-10-22

## 2023-10-10 RX ORDER — MIRABEGRON 50 MG/1
1 TABLET, FILM COATED, EXTENDED RELEASE ORAL DAILY
COMMUNITY
Start: 2023-07-26 | End: 2024-01-04

## 2023-10-12 ENCOUNTER — CLINICAL SUPPORT (OUTPATIENT)
Dept: PREADMISSION TESTING | Facility: HOSPITAL | Age: 23
End: 2023-10-12
Payer: COMMERCIAL

## 2023-10-12 VITALS
WEIGHT: 190 LBS | BODY MASS INDEX: 28.14 KG/M2 | HEIGHT: 69 IN | DIASTOLIC BLOOD PRESSURE: 59 MMHG | SYSTOLIC BLOOD PRESSURE: 97 MMHG | TEMPERATURE: 97.3 F | HEART RATE: 76 BPM

## 2023-10-12 DIAGNOSIS — N31.9 NEUROGENIC BLADDER: Primary | ICD-10-CM

## 2023-10-12 LAB
ABO GROUP (TYPE) IN BLOOD: NORMAL
ANION GAP SERPL CALC-SCNC: 15 MMOL/L (ref 10–20)
ANTIBODY SCREEN: NORMAL
BUN SERPL-MCNC: 8 MG/DL (ref 6–23)
CALCIUM SERPL-MCNC: 9.2 MG/DL (ref 8.6–10.6)
CHLORIDE SERPL-SCNC: 105 MMOL/L (ref 98–107)
CO2 SERPL-SCNC: 26 MMOL/L (ref 21–32)
CREAT SERPL-MCNC: 0.5 MG/DL (ref 0.5–1.3)
GFR SERPL CREATININE-BSD FRML MDRD: >90 ML/MIN/1.73M*2
GLUCOSE SERPL-MCNC: 87 MG/DL (ref 74–99)
POTASSIUM SERPL-SCNC: 4 MMOL/L (ref 3.5–5.3)
RH FACTOR (ANTIGEN D): NORMAL
SODIUM SERPL-SCNC: 142 MMOL/L (ref 136–145)

## 2023-10-12 PROCEDURE — 80048 BASIC METABOLIC PNL TOTAL CA: CPT

## 2023-10-12 PROCEDURE — 99214 OFFICE O/P EST MOD 30 MIN: CPT | Performed by: NURSE PRACTITIONER

## 2023-10-12 PROCEDURE — 36415 COLL VENOUS BLD VENIPUNCTURE: CPT

## 2023-10-12 PROCEDURE — 86900 BLOOD TYPING SEROLOGIC ABO: CPT

## 2023-10-12 ASSESSMENT — DUKE ACTIVITY SCORE INDEX (DASI)
DASI METS SCORE: 7.6
CAN YOU DO LIGHT WORK AROUND THE HOUSE LIKE DUSTING OR WASHING DISHES: NO
CAN YOU HAVE SEXUAL RELATIONS: YES
CAN YOU WALK INDOORS, SUCH AS AROUND YOUR HOUSE: YES
CAN YOU DO MODERATE WORK AROUND THE HOUSE LIKE VACUUMING, SWEEPING FLOORS OR CARRYING GROCERIES: NO
CAN YOU WALK A BLOCK OR TWO ON LEVEL GROUND: YES
CAN YOU CLIMB A FLIGHT OF STAIRS OR WALK UP A HILL: YES
CAN YOU PARTICIPATE IN STRENOUS SPORTS LIKE SWIMMING, SINGLES TENNIS, FOOTBALL, BASKETBALL, OR SKIING: YES
CAN YOU DO YARD WORK LIKE RAKING LEAVES, WEEDING OR PUSHING A MOWER: NO
CAN YOU PARTICIPATE IN MODERATE RECREATIONAL ACTIVITIES LIKE GOLF, BOWLING, DANCING, DOUBLES TENNIS OR THROWING A BASEBALL OR FOOTBALL: YES
CAN YOU DO HEAVY WORK AROUND THE HOUSE LIKE SCRUBBING FLOORS OR LIFTING AND MOVING HEAVY FURNITURE: NO
TOTAL_SCORE: 39.5
CAN YOU TAKE CARE OF YOURSELF (EAT, DRESS, BATHE, OR USE TOILET): YES
CAN YOU RUN A SHORT DISTANCE: YES

## 2023-10-12 ASSESSMENT — CHADS2 SCORE
CHF: NO
PRIOR STROKE OR TIA OR THROMBOEMBOLISM: NO
CHADS2 SCORE: 0
AGE GREATER THAN OR EQUAL TO 75: NO
HYPERTENSION: NO
DIABETES: NO

## 2023-10-12 ASSESSMENT — ENCOUNTER SYMPTOMS
EYES NEGATIVE: 1
CONSTITUTIONAL NEGATIVE: 1
HEMATOLOGIC/LYMPHATIC NEGATIVE: 1
NEUROLOGICAL NEGATIVE: 1
HEARTBURN: 1
RESPIRATORY NEGATIVE: 1
MUSCULOSKELETAL NEGATIVE: 1
CARDIOVASCULAR NEGATIVE: 1
ALLERGIC/IMMUNOLOGIC NEGATIVE: 1
PSYCHIATRIC NEGATIVE: 1
ENDOCRINE NEGATIVE: 1

## 2023-10-12 ASSESSMENT — LIFESTYLE VARIABLES
SMOKING_STATUS: NONSMOKER
SMOKING_STATUS: NONSMOKER

## 2023-10-12 ASSESSMENT — ACTIVITIES OF DAILY LIVING (ADL): ADL_SCORE: 5

## 2023-10-12 NOTE — CPM/PAT H&P
CPM/PAT Evaluation       Name: Jose Fernandez (Jose Fernandez)  /Age: 2000/23 y.o.     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  Patient is being evaluated in [facility] in anticipation of a cystoscopy, and bladder Botox cystogram with Dr. Vargas on 10-.  Past Medical History:   Diagnosis Date    Abnormal results of pulmonary function studies 2019    Abnormal PFT    Acute infarction of spinal cord (embolic) (nonembolic) (CMS/[facility]) 2020    intrauterine hemorrhage and subsequent cord infarction    Asthma     last seen by Lyric Cameron 2023    GERD (gastroesophageal reflux disease)     s/p Nissen    Hemiplegia (CMS/[facility])     History of transfusion     Immunocompromised (CMS/HCC)     Intra-abdominal abscess (CMS/HCC)     recurrent    Mitrofanoff appendicovesicostomy present (CMS/[facility])     MSSA (methicillin susceptible Staphylococcus aureus)     MSSA R gluteal abscess 2023    Neurogenic bladder     Neurogenic bowel     Other conditions influencing health status 2013    Drug-induced Myelopathy    Other conditions influencing health status 2022    Abscess, abdomen    Pectus excavatum 2013    Pectus excavatum    Peritoneal abscess (CMS/HCC) 2021    Peritonitis with abscess of intestine    Personal history of diseases of the skin and subcutaneous tissue 2019    History of dermatitis    Personal history of diseases of the skin and subcutaneous tissue     History of eczema    Personal history of other diseases of the digestive system 2020    History of chronic constipation    Personal history of other infectious and parasitic diseases 2019    History of onychomycosis     Personal history of other infectious and parasitic diseases 09/24/2018    History of wound infection    Recurrent UTI (urinary tract infection)     s/p Mitrofanoff    Restrictive lung disease     Scoliosis, unspecified     Kyphoscoliosis    Snoring     Snoring    Tinea pedis 07/23/2019    Tinea pedis of both feet    UTI (urinary tract infection)     current    Xerosis cutis 07/23/2019    Xerosis of skin       Past Surgical History:   Procedure Laterality Date    ACHILLES TENDON SURGERY  10/30/2013    Subcutaneous Tenotomy Achilles Tendon Under Gen Anesthesia    BACLOFEN PUMP IMPLANTATION  2019    BRONCHOSCOPY      CYSTOSCOPY      GASTROSTOMY  10/30/2013    Gastric Surgery Feeding Gastrostomy s/p removal    NISSEN FUNDOPLICATION      2003    OTHER SURGICAL HISTORY  10/30/2013    Direct Laryngoscopy    OTHER SURGICAL HISTORY  10/30/2013    Closed Treatment Fx Of Proximal Femoral Neck W/ Manipulation    OTHER SURGICAL HISTORY  04/16/2015    Complex Repair Of Wound Trunk 2.6 To 7.5 Cm    OTHER SURGICAL HISTORY  10/14/2022    Circumcision    SPINAL FUSION      x2 2015, 2017    TRACHEOSTOMY TUBE PLACEMENT      s/p decannulation 2005    US GUIDED PERCUTANEOUS PERITONEAL OR RETROPERITONEAL FLUID COLLECTION DRAINAGE  04/08/2021    US GUIDED PERCUTANEOUS PERITONEAL OR RETROPERITONEAL FLUID COLLECTION DRAINAGE 4/8/2021 [facility]       Patient Sexual activity questions deferred to the physician.    No family history on file.    Allergies   Allergen Reactions    Ciprofloxacin Anaphylaxis    Fish Containing Products Hives    Other Other    Shellfish Containing Products Hives    Sulfamethoxazole-Trimethoprim Unknown    Vancomycin Other     Red Man's syndrome        Prior to Admission medications    Medication Sig Start Date End Date Taking? Authorizing Provider   albuterol 2.5 mg /3 mL (0.083 %) nebulizer solution Inhale. Inhale one vial every 4-6 hours as needed for cough, wheezing and shortness of breath 1/16/15  Yes  Historical Provider, MD   amoxicillin-pot clavulanate (Augmentin) 875-125 mg tablet Take 1 tablet (875 mg) by mouth 2 times a day for 12 days. 10/10/23 10/22/23 Yes Adam Vargas MD   budesonide-formoteroL (Symbicort) 160-4.5 mcg/actuation inhaler INHALE 2 PUFFS every 4-6 hours as needed for prolonged cough, shortness of breath, and wheeze as well as 2 puffs once daily. MAX 12 puffs per day. 5/12/22  Yes Historical Provider, MD   cyanocobalamin (Vitamin B-12) 1,000 mcg tablet TAKE 1 TABLET DAILY AS DIRECTED. 6/17/22  Yes Historical Provider, MD   docusate sodium (Colace) 100 mg capsule Take 1 capsule (100 mg) by mouth once daily.   Yes Historical Provider, MD   famotidine (Pepcid) 40 mg tablet TAKE 1 TABLET BY MOUTH TWICE DAILY. APPOINTMENT NEEDED FOR REFILLS 2/21/20  Yes Historical Provider, MD   ferrous sulfate ER (Slow Fe) 142 mg ER tablet Take 1 tablet by mouth once daily. 2/5/19  Yes Historical Provider, MD   L. ACIDOPHILUS/BIFID. ANIMALIS ORAL Take 1 capsule by mouth once daily.   Yes Historical Provider, MD   montelukast (Singulair) 10 mg tablet Take 1 tablet (10 mg) by mouth once daily. 4/22/16  Yes Historical Provider, MD   Myrbetriq 50 mg tablet extended release 24 hr 24 hr tablet Take 1 tablet (50 mg) by mouth once daily. 7/26/23  Yes Historical Provider, MD   oxybutynin XL (Ditropan-XL) 15 mg 24 hr tablet Take 1 tablet (15 mg) by mouth once daily. 2/20/17  Yes Historical Provider, MD   polyethylene glycol (Glycolax) 17 gram/dose powder MIX 1 CAPFUL (17GM) IN 8 OUNCES OF WATER, JUICE, OR TEA AND DRINK DAILY. 5/8/18  Yes Historical Provider, MD   cholecalciferol (Vitamin D-3) 50 mcg (2,000 unit) capsule TAKE 1 CAPSULE Daily 6/17/22   Historical Provider, MD   amikacin (Amikin) 1,000 mg/4 mL solution injection 0.06 mL (15 mg).  10/12/23  Historical Provider, MD   amoxicillin-pot clavulanate (Augmentin) 875-125 mg tablet Take 1 tablet (875 mg) by mouth 2 times a day.  10/10/23  Historical Provider, MD    cefpodoxime (Vantin) 200 mg tablet Take 1 tablet (200 mg) by mouth 2 times a day.  10/10/23  Historical Provider, MD   d-mannose 500 mg capsule Take 1 capsule by mouth once daily.  10/10/23  Historical Provider, MD   metroNIDAZOLE (Flagyl) 500 mg tablet Take 1 tablet (500 mg) by mouth 3 times a day.  10/10/23  Historical Provider, MD   mupirocin (Bactroban) 2 % ointment APPLY SPARINGLY TO AFFECTED AREA(S) 3 TIMES A DAY 10/14/22 10/10/23  Historical Provider, MD        Review of Systems   Constitutional: Negative.   HENT: Negative.     Eyes: Negative.    Cardiovascular: Negative.    Respiratory: Negative.     Endocrine: Negative.    Hematologic/Lymphatic: Negative.    Skin: Negative.    Musculoskeletal: Negative.    Gastrointestinal:  Positive for heartburn.   Genitourinary:  Positive for bladder incontinence.   Neurological: Negative.    Psychiatric/Behavioral: Negative.     Allergic/Immunologic: Negative.         Physical Exam  Vitals reviewed.   HENT:      Head: Normocephalic.      Nose: Nose normal.      Mouth/Throat:      Mouth: Mucous membranes are moist.   Eyes:      Pupils: Pupils are equal, round, and reactive to light.   Cardiovascular:      Rate and Rhythm: Normal rate.      Pulses: Normal pulses.   Pulmonary:      Effort: Pulmonary effort is normal.   Abdominal:      General: Bowel sounds are normal.      Palpations: Abdomen is soft.   Musculoskeletal:      Cervical back: Normal range of motion.      Comments: Wheelchair bound   Skin:     General: Skin is warm.   Neurological:      Mental Status: He is alert and oriented to person, place, and time.   Psychiatric:         Mood and Affect: Mood normal.        PAT AIRWAY:   Airway:     Mallampati::  II    TM distance::  >3 FB    Neck ROM::  Full      Visit Vitals  BP 97/59   Pulse 76   Temp 36.3 °C (97.3 °F)       DASI Risk Score      Flowsheet Row Most Recent Value   DASI SCORE 39.5   METS Score (Will be calculated only when all the questions are  answered) 7.6          Caprini DVT Assessment      Flowsheet Row Most Recent Value   DVT Score 4   Current Status Major surgery planned, lasting 2-3 hours   History Prior major surgery   Age Less than 40 years   BMI 30 or less          Modified Frailty Index      Flowsheet Row Most Recent Value   Modified Frailty Index Calculator .0909          CHADS2 Stroke Risk  Current as of 13 minutes ago        N/A 3 - 100%: High Risk   2 - 3%: Medium Risk   0 - 2%: Low Risk     Last Change: N/A          This score determines the patient's risk of having a stroke if the patient has atrial fibrillation.        This score is not applicable to this patient. Components are not calculated.          Revised Cardiac Risk Index      Flowsheet Row Most Recent Value   Revised Cardiac Risk Calculator 0          Apfel Simplified Score      Flowsheet Row Most Recent Value   Apfel Simplified Score Calculator 2          Risk Analysis Index Results This Encounter         10/12/2023  0944             DEWEY Cancer History: Patient does not indicate history of cancer          Stop Bang Score      Flowsheet Row Most Recent Value   Do you snore loudly? 1   Do you often feel tired or fatigued after your sleep? 0   Has anyone ever observed you stop breathing in your sleep? 0   Do you have or are you being treated for high blood pressure? 0   Recent BMI (Calculated) 28.1   Is BMI greater than 35 kg/m2? 0=No   Age older than 50 years old? 0=No            Assessment and Plan:     HEENT/Airway:  Cardiovascular: BJ 0.1%, RCRI-6.0 %  30-day risk of death, MI, or cardiac arrest  Pulmonary: PRODIGY 11  Renal: Neurogenic bowel and bladder  Gastrointestinal: GERD  Infectious disease:   Musculoskeletal: wheelchair bound  Other:  Infarction of spinal cord  EAT 10-0

## 2023-10-12 NOTE — H&P (VIEW-ONLY)
CPM/PAT Evaluation       Name: Jose Fernandez (Jose Fernandez)  /Age: 2000/23 y.o.     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  Patient is being evaluated in [facility] in anticipation of a cystoscopy, and bladder Botox cystogram with Dr. Vargas on 10-.  Past Medical History:   Diagnosis Date    Abnormal results of pulmonary function studies 2019    Abnormal PFT    Acute infarction of spinal cord (embolic) (nonembolic) (CMS/[facility]) 2020    intrauterine hemorrhage and subsequent cord infarction    Asthma     last seen by Lyric Cameron 2023    GERD (gastroesophageal reflux disease)     s/p Nissen    Hemiplegia (CMS/[facility])     History of transfusion     Immunocompromised (CMS/HCC)     Intra-abdominal abscess (CMS/HCC)     recurrent    Mitrofanoff appendicovesicostomy present (CMS/[facility])     MSSA (methicillin susceptible Staphylococcus aureus)     MSSA R gluteal abscess 2023    Neurogenic bladder     Neurogenic bowel     Other conditions influencing health status 2013    Drug-induced Myelopathy    Other conditions influencing health status 2022    Abscess, abdomen    Pectus excavatum 2013    Pectus excavatum    Peritoneal abscess (CMS/HCC) 2021    Peritonitis with abscess of intestine    Personal history of diseases of the skin and subcutaneous tissue 2019    History of dermatitis    Personal history of diseases of the skin and subcutaneous tissue     History of eczema    Personal history of other diseases of the digestive system 2020    History of chronic constipation    Personal history of other infectious and parasitic diseases 2019    History of onychomycosis     Personal history of other infectious and parasitic diseases 09/24/2018    History of wound infection    Recurrent UTI (urinary tract infection)     s/p Mitrofanoff    Restrictive lung disease     Scoliosis, unspecified     Kyphoscoliosis    Snoring     Snoring    Tinea pedis 07/23/2019    Tinea pedis of both feet    UTI (urinary tract infection)     current    Xerosis cutis 07/23/2019    Xerosis of skin       Past Surgical History:   Procedure Laterality Date    ACHILLES TENDON SURGERY  10/30/2013    Subcutaneous Tenotomy Achilles Tendon Under Gen Anesthesia    BACLOFEN PUMP IMPLANTATION  2019    BRONCHOSCOPY      CYSTOSCOPY      GASTROSTOMY  10/30/2013    Gastric Surgery Feeding Gastrostomy s/p removal    NISSEN FUNDOPLICATION      2003    OTHER SURGICAL HISTORY  10/30/2013    Direct Laryngoscopy    OTHER SURGICAL HISTORY  10/30/2013    Closed Treatment Fx Of Proximal Femoral Neck W/ Manipulation    OTHER SURGICAL HISTORY  04/16/2015    Complex Repair Of Wound Trunk 2.6 To 7.5 Cm    OTHER SURGICAL HISTORY  10/14/2022    Circumcision    SPINAL FUSION      x2 2015, 2017    TRACHEOSTOMY TUBE PLACEMENT      s/p decannulation 2005    US GUIDED PERCUTANEOUS PERITONEAL OR RETROPERITONEAL FLUID COLLECTION DRAINAGE  04/08/2021    US GUIDED PERCUTANEOUS PERITONEAL OR RETROPERITONEAL FLUID COLLECTION DRAINAGE 4/8/2021 [facility]       Patient Sexual activity questions deferred to the physician.    No family history on file.    Allergies   Allergen Reactions    Ciprofloxacin Anaphylaxis    Fish Containing Products Hives    Other Other    Shellfish Containing Products Hives    Sulfamethoxazole-Trimethoprim Unknown    Vancomycin Other     Red Man's syndrome        Prior to Admission medications    Medication Sig Start Date End Date Taking? Authorizing Provider   albuterol 2.5 mg /3 mL (0.083 %) nebulizer solution Inhale. Inhale one vial every 4-6 hours as needed for cough, wheezing and shortness of breath 1/16/15  Yes  Historical Provider, MD   amoxicillin-pot clavulanate (Augmentin) 875-125 mg tablet Take 1 tablet (875 mg) by mouth 2 times a day for 12 days. 10/10/23 10/22/23 Yes Adam Vargas MD   budesonide-formoteroL (Symbicort) 160-4.5 mcg/actuation inhaler INHALE 2 PUFFS every 4-6 hours as needed for prolonged cough, shortness of breath, and wheeze as well as 2 puffs once daily. MAX 12 puffs per day. 5/12/22  Yes Historical Provider, MD   cyanocobalamin (Vitamin B-12) 1,000 mcg tablet TAKE 1 TABLET DAILY AS DIRECTED. 6/17/22  Yes Historical Provider, MD   docusate sodium (Colace) 100 mg capsule Take 1 capsule (100 mg) by mouth once daily.   Yes Historical Provider, MD   famotidine (Pepcid) 40 mg tablet TAKE 1 TABLET BY MOUTH TWICE DAILY. APPOINTMENT NEEDED FOR REFILLS 2/21/20  Yes Historical Provider, MD   ferrous sulfate ER (Slow Fe) 142 mg ER tablet Take 1 tablet by mouth once daily. 2/5/19  Yes Historical Provider, MD   L. ACIDOPHILUS/BIFID. ANIMALIS ORAL Take 1 capsule by mouth once daily.   Yes Historical Provider, MD   montelukast (Singulair) 10 mg tablet Take 1 tablet (10 mg) by mouth once daily. 4/22/16  Yes Historical Provider, MD   Myrbetriq 50 mg tablet extended release 24 hr 24 hr tablet Take 1 tablet (50 mg) by mouth once daily. 7/26/23  Yes Historical Provider, MD   oxybutynin XL (Ditropan-XL) 15 mg 24 hr tablet Take 1 tablet (15 mg) by mouth once daily. 2/20/17  Yes Historical Provider, MD   polyethylene glycol (Glycolax) 17 gram/dose powder MIX 1 CAPFUL (17GM) IN 8 OUNCES OF WATER, JUICE, OR TEA AND DRINK DAILY. 5/8/18  Yes Historical Provider, MD   cholecalciferol (Vitamin D-3) 50 mcg (2,000 unit) capsule TAKE 1 CAPSULE Daily 6/17/22   Historical Provider, MD   amikacin (Amikin) 1,000 mg/4 mL solution injection 0.06 mL (15 mg).  10/12/23  Historical Provider, MD   amoxicillin-pot clavulanate (Augmentin) 875-125 mg tablet Take 1 tablet (875 mg) by mouth 2 times a day.  10/10/23  Historical Provider, MD    cefpodoxime (Vantin) 200 mg tablet Take 1 tablet (200 mg) by mouth 2 times a day.  10/10/23  Historical Provider, MD   d-mannose 500 mg capsule Take 1 capsule by mouth once daily.  10/10/23  Historical Provider, MD   metroNIDAZOLE (Flagyl) 500 mg tablet Take 1 tablet (500 mg) by mouth 3 times a day.  10/10/23  Historical Provider, MD   mupirocin (Bactroban) 2 % ointment APPLY SPARINGLY TO AFFECTED AREA(S) 3 TIMES A DAY 10/14/22 10/10/23  Historical Provider, MD        Review of Systems   Constitutional: Negative.   HENT: Negative.     Eyes: Negative.    Cardiovascular: Negative.    Respiratory: Negative.     Endocrine: Negative.    Hematologic/Lymphatic: Negative.    Skin: Negative.    Musculoskeletal: Negative.    Gastrointestinal:  Positive for heartburn.   Genitourinary:  Positive for bladder incontinence.   Neurological: Negative.    Psychiatric/Behavioral: Negative.     Allergic/Immunologic: Negative.         Physical Exam  Vitals reviewed.   HENT:      Head: Normocephalic.      Nose: Nose normal.      Mouth/Throat:      Mouth: Mucous membranes are moist.   Eyes:      Pupils: Pupils are equal, round, and reactive to light.   Cardiovascular:      Rate and Rhythm: Normal rate.      Pulses: Normal pulses.   Pulmonary:      Effort: Pulmonary effort is normal.   Abdominal:      General: Bowel sounds are normal.      Palpations: Abdomen is soft.   Musculoskeletal:      Cervical back: Normal range of motion.      Comments: Wheelchair bound   Skin:     General: Skin is warm.   Neurological:      Mental Status: He is alert and oriented to person, place, and time.   Psychiatric:         Mood and Affect: Mood normal.        PAT AIRWAY:   Airway:     Mallampati::  II    TM distance::  >3 FB    Neck ROM::  Full      Visit Vitals  BP 97/59   Pulse 76   Temp 36.3 °C (97.3 °F)       DASI Risk Score      Flowsheet Row Most Recent Value   DASI SCORE 39.5   METS Score (Will be calculated only when all the questions are  answered) 7.6          Caprini DVT Assessment      Flowsheet Row Most Recent Value   DVT Score 4   Current Status Major surgery planned, lasting 2-3 hours   History Prior major surgery   Age Less than 40 years   BMI 30 or less          Modified Frailty Index      Flowsheet Row Most Recent Value   Modified Frailty Index Calculator .0909          CHADS2 Stroke Risk  Current as of 13 minutes ago        N/A 3 - 100%: High Risk   2 - 3%: Medium Risk   0 - 2%: Low Risk     Last Change: N/A          This score determines the patient's risk of having a stroke if the patient has atrial fibrillation.        This score is not applicable to this patient. Components are not calculated.          Revised Cardiac Risk Index      Flowsheet Row Most Recent Value   Revised Cardiac Risk Calculator 0          Apfel Simplified Score      Flowsheet Row Most Recent Value   Apfel Simplified Score Calculator 2          Risk Analysis Index Results This Encounter         10/12/2023  0944             DEWEY Cancer History: Patient does not indicate history of cancer          Stop Bang Score      Flowsheet Row Most Recent Value   Do you snore loudly? 1   Do you often feel tired or fatigued after your sleep? 0   Has anyone ever observed you stop breathing in your sleep? 0   Do you have or are you being treated for high blood pressure? 0   Recent BMI (Calculated) 28.1   Is BMI greater than 35 kg/m2? 0=No   Age older than 50 years old? 0=No            Assessment and Plan:     HEENT/Airway:  Cardiovascular: BJ 0.1%, RCRI-6.0 %  30-day risk of death, MI, or cardiac arrest  Pulmonary: PRODIGY 11  Renal: Neurogenic bowel and bladder  Gastrointestinal: GERD  Infectious disease:   Musculoskeletal: wheelchair bound  Other:  Infarction of spinal cord  EAT 10-0

## 2023-10-20 ENCOUNTER — HOSPITAL ENCOUNTER (OUTPATIENT)
Facility: HOSPITAL | Age: 23
Setting detail: OUTPATIENT SURGERY
Discharge: HOME | End: 2023-10-20
Attending: STUDENT IN AN ORGANIZED HEALTH CARE EDUCATION/TRAINING PROGRAM | Admitting: STUDENT IN AN ORGANIZED HEALTH CARE EDUCATION/TRAINING PROGRAM
Payer: COMMERCIAL

## 2023-10-20 ENCOUNTER — ANESTHESIA EVENT (OUTPATIENT)
Dept: OPERATING ROOM | Facility: HOSPITAL | Age: 23
End: 2023-10-20
Payer: COMMERCIAL

## 2023-10-20 ENCOUNTER — APPOINTMENT (OUTPATIENT)
Dept: RADIOLOGY | Facility: HOSPITAL | Age: 23
End: 2023-10-20
Payer: COMMERCIAL

## 2023-10-20 ENCOUNTER — ANESTHESIA (OUTPATIENT)
Dept: OPERATING ROOM | Facility: HOSPITAL | Age: 23
End: 2023-10-20
Payer: COMMERCIAL

## 2023-10-20 VITALS
WEIGHT: 188 LBS | TEMPERATURE: 96.8 F | OXYGEN SATURATION: 97 % | BODY MASS INDEX: 27.85 KG/M2 | RESPIRATION RATE: 17 BRPM | SYSTOLIC BLOOD PRESSURE: 121 MMHG | DIASTOLIC BLOOD PRESSURE: 57 MMHG | HEIGHT: 69 IN | HEART RATE: 72 BPM

## 2023-10-20 DIAGNOSIS — N31.9 NEUROMUSCULAR DYSFUNCTION OF BLADDER, UNSPECIFIED: Primary | ICD-10-CM

## 2023-10-20 PROCEDURE — 74430 CONTRAST X-RAY BLADDER: CPT

## 2023-10-20 PROCEDURE — A52287 PR CYSTOURETHROSCOPY INJ CHEMODENERVATION BLADDER: Performed by: ANESTHESIOLOGY

## 2023-10-20 PROCEDURE — A4217 STERILE WATER/SALINE, 500 ML: HCPCS | Mod: SE | Performed by: STUDENT IN AN ORGANIZED HEALTH CARE EDUCATION/TRAINING PROGRAM

## 2023-10-20 PROCEDURE — 2500000004 HC RX 250 GENERAL PHARMACY W/ HCPCS (ALT 636 FOR OP/ED): Mod: SE

## 2023-10-20 PROCEDURE — 3700000002 HC GENERAL ANESTHESIA TIME - EACH INCREMENTAL 1 MINUTE: Performed by: STUDENT IN AN ORGANIZED HEALTH CARE EDUCATION/TRAINING PROGRAM

## 2023-10-20 PROCEDURE — 52287 CYSTOSCOPY CHEMODENERVATION: CPT | Performed by: STUDENT IN AN ORGANIZED HEALTH CARE EDUCATION/TRAINING PROGRAM

## 2023-10-20 PROCEDURE — 7100000009 HC PHASE TWO TIME - INITIAL BASE CHARGE: Performed by: STUDENT IN AN ORGANIZED HEALTH CARE EDUCATION/TRAINING PROGRAM

## 2023-10-20 PROCEDURE — 3600000008 HC OR TIME - EACH INCREMENTAL 1 MINUTE - PROCEDURE LEVEL THREE: Performed by: STUDENT IN AN ORGANIZED HEALTH CARE EDUCATION/TRAINING PROGRAM

## 2023-10-20 PROCEDURE — 2500000005 HC RX 250 GENERAL PHARMACY W/O HCPCS: Mod: SE

## 2023-10-20 PROCEDURE — 2550000001 HC RX 255 CONTRASTS: Mod: SE | Performed by: STUDENT IN AN ORGANIZED HEALTH CARE EDUCATION/TRAINING PROGRAM

## 2023-10-20 PROCEDURE — 49320 DIAG LAPARO SEPARATE PROC: CPT | Performed by: STUDENT IN AN ORGANIZED HEALTH CARE EDUCATION/TRAINING PROGRAM

## 2023-10-20 PROCEDURE — 74430 CONTRAST X-RAY BLADDER: CPT | Performed by: STUDENT IN AN ORGANIZED HEALTH CARE EDUCATION/TRAINING PROGRAM

## 2023-10-20 PROCEDURE — 3600000003 HC OR TIME - INITIAL BASE CHARGE - PROCEDURE LEVEL THREE: Performed by: STUDENT IN AN ORGANIZED HEALTH CARE EDUCATION/TRAINING PROGRAM

## 2023-10-20 PROCEDURE — 2500000004 HC RX 250 GENERAL PHARMACY W/ HCPCS (ALT 636 FOR OP/ED): Mod: SE | Performed by: STUDENT IN AN ORGANIZED HEALTH CARE EDUCATION/TRAINING PROGRAM

## 2023-10-20 PROCEDURE — 3700000001 HC GENERAL ANESTHESIA TIME - INITIAL BASE CHARGE: Performed by: STUDENT IN AN ORGANIZED HEALTH CARE EDUCATION/TRAINING PROGRAM

## 2023-10-20 PROCEDURE — 7100000002 HC RECOVERY ROOM TIME - EACH INCREMENTAL 1 MINUTE: Performed by: STUDENT IN AN ORGANIZED HEALTH CARE EDUCATION/TRAINING PROGRAM

## 2023-10-20 PROCEDURE — 7100000010 HC PHASE TWO TIME - EACH INCREMENTAL 1 MINUTE: Performed by: STUDENT IN AN ORGANIZED HEALTH CARE EDUCATION/TRAINING PROGRAM

## 2023-10-20 PROCEDURE — 51600 INJECTION FOR BLADDER X-RAY: CPT | Performed by: STUDENT IN AN ORGANIZED HEALTH CARE EDUCATION/TRAINING PROGRAM

## 2023-10-20 PROCEDURE — 2720000007 HC OR 272 NO HCPCS: Performed by: STUDENT IN AN ORGANIZED HEALTH CARE EDUCATION/TRAINING PROGRAM

## 2023-10-20 PROCEDURE — 7100000001 HC RECOVERY ROOM TIME - INITIAL BASE CHARGE: Performed by: STUDENT IN AN ORGANIZED HEALTH CARE EDUCATION/TRAINING PROGRAM

## 2023-10-20 RX ORDER — KETOROLAC TROMETHAMINE 30 MG/ML
INJECTION, SOLUTION INTRAMUSCULAR; INTRAVENOUS AS NEEDED
Status: DISCONTINUED | OUTPATIENT
Start: 2023-10-20 | End: 2023-10-20

## 2023-10-20 RX ORDER — FENTANYL CITRATE 50 UG/ML
INJECTION, SOLUTION INTRAMUSCULAR; INTRAVENOUS AS NEEDED
Status: DISCONTINUED | OUTPATIENT
Start: 2023-10-20 | End: 2023-10-20

## 2023-10-20 RX ORDER — LIDOCAINE HCL/PF 100 MG/5ML
SYRINGE (ML) INTRAVENOUS AS NEEDED
Status: DISCONTINUED | OUTPATIENT
Start: 2023-10-20 | End: 2023-10-20

## 2023-10-20 RX ORDER — ALBUTEROL SULFATE 0.83 MG/ML
2.5 SOLUTION RESPIRATORY (INHALATION) ONCE AS NEEDED
Status: DISCONTINUED | OUTPATIENT
Start: 2023-10-20 | End: 2023-10-20 | Stop reason: HOSPADM

## 2023-10-20 RX ORDER — CEFTRIAXONE 1 G/1
INJECTION, POWDER, FOR SOLUTION INTRAMUSCULAR; INTRAVENOUS AS NEEDED
Status: DISCONTINUED | OUTPATIENT
Start: 2023-10-20 | End: 2023-10-20

## 2023-10-20 RX ORDER — WATER 1 ML/ML
IRRIGANT IRRIGATION AS NEEDED
Status: DISCONTINUED | OUTPATIENT
Start: 2023-10-20 | End: 2023-10-20 | Stop reason: HOSPADM

## 2023-10-20 RX ORDER — CEFAZOLIN SODIUM 2 G/100ML
2 INJECTION, SOLUTION INTRAVENOUS EVERY 8 HOURS
Status: DISCONTINUED | OUTPATIENT
Start: 2023-10-20 | End: 2023-10-20 | Stop reason: HOSPADM

## 2023-10-20 RX ORDER — HYDROMORPHONE HYDROCHLORIDE 1 MG/ML
0.5 INJECTION, SOLUTION INTRAMUSCULAR; INTRAVENOUS; SUBCUTANEOUS EVERY 5 MIN PRN
Status: DISCONTINUED | OUTPATIENT
Start: 2023-10-20 | End: 2023-10-20 | Stop reason: HOSPADM

## 2023-10-20 RX ORDER — GLYCOPYRROLATE 0.2 MG/ML
INJECTION INTRAMUSCULAR; INTRAVENOUS AS NEEDED
Status: DISCONTINUED | OUTPATIENT
Start: 2023-10-20 | End: 2023-10-20

## 2023-10-20 RX ORDER — HYDROMORPHONE HYDROCHLORIDE 1 MG/ML
0.2 INJECTION, SOLUTION INTRAMUSCULAR; INTRAVENOUS; SUBCUTANEOUS EVERY 5 MIN PRN
Status: DISCONTINUED | OUTPATIENT
Start: 2023-10-20 | End: 2023-10-20 | Stop reason: HOSPADM

## 2023-10-20 RX ORDER — OXYCODONE HYDROCHLORIDE 5 MG/1
10 TABLET ORAL EVERY 4 HOURS PRN
Status: DISCONTINUED | OUTPATIENT
Start: 2023-10-20 | End: 2023-10-20 | Stop reason: HOSPADM

## 2023-10-20 RX ORDER — ONDANSETRON HYDROCHLORIDE 2 MG/ML
4 INJECTION, SOLUTION INTRAVENOUS ONCE AS NEEDED
Status: DISCONTINUED | OUTPATIENT
Start: 2023-10-20 | End: 2023-10-20 | Stop reason: HOSPADM

## 2023-10-20 RX ORDER — PROPOFOL 10 MG/ML
INJECTION, EMULSION INTRAVENOUS AS NEEDED
Status: DISCONTINUED | OUTPATIENT
Start: 2023-10-20 | End: 2023-10-20

## 2023-10-20 RX ORDER — HYDRALAZINE HYDROCHLORIDE 20 MG/ML
5 INJECTION INTRAMUSCULAR; INTRAVENOUS EVERY 30 MIN PRN
Status: DISCONTINUED | OUTPATIENT
Start: 2023-10-20 | End: 2023-10-20 | Stop reason: HOSPADM

## 2023-10-20 RX ORDER — ROCURONIUM BROMIDE 10 MG/ML
INJECTION, SOLUTION INTRAVENOUS AS NEEDED
Status: DISCONTINUED | OUTPATIENT
Start: 2023-10-20 | End: 2023-10-20

## 2023-10-20 RX ORDER — OXYCODONE HYDROCHLORIDE 5 MG/1
5 TABLET ORAL EVERY 4 HOURS PRN
Status: DISCONTINUED | OUTPATIENT
Start: 2023-10-20 | End: 2023-10-20 | Stop reason: HOSPADM

## 2023-10-20 RX ORDER — ONDANSETRON HYDROCHLORIDE 2 MG/ML
INJECTION, SOLUTION INTRAVENOUS AS NEEDED
Status: DISCONTINUED | OUTPATIENT
Start: 2023-10-20 | End: 2023-10-20

## 2023-10-20 RX ORDER — ACETAMINOPHEN 325 MG/1
650 TABLET ORAL EVERY 4 HOURS PRN
Status: DISCONTINUED | OUTPATIENT
Start: 2023-10-20 | End: 2023-10-20 | Stop reason: HOSPADM

## 2023-10-20 RX ORDER — MIDAZOLAM HYDROCHLORIDE 1 MG/ML
INJECTION, SOLUTION INTRAMUSCULAR; INTRAVENOUS AS NEEDED
Status: DISCONTINUED | OUTPATIENT
Start: 2023-10-20 | End: 2023-10-20

## 2023-10-20 RX ORDER — SODIUM CHLORIDE, SODIUM LACTATE, POTASSIUM CHLORIDE, CALCIUM CHLORIDE 600; 310; 30; 20 MG/100ML; MG/100ML; MG/100ML; MG/100ML
INJECTION, SOLUTION INTRAVENOUS CONTINUOUS PRN
Status: DISCONTINUED | OUTPATIENT
Start: 2023-10-20 | End: 2023-10-20

## 2023-10-20 RX ORDER — LABETALOL HYDROCHLORIDE 5 MG/ML
5 INJECTION, SOLUTION INTRAVENOUS ONCE AS NEEDED
Status: DISCONTINUED | OUTPATIENT
Start: 2023-10-20 | End: 2023-10-20 | Stop reason: HOSPADM

## 2023-10-20 RX ORDER — SODIUM CHLORIDE, SODIUM LACTATE, POTASSIUM CHLORIDE, CALCIUM CHLORIDE 600; 310; 30; 20 MG/100ML; MG/100ML; MG/100ML; MG/100ML
100 INJECTION, SOLUTION INTRAVENOUS CONTINUOUS
Status: DISCONTINUED | OUTPATIENT
Start: 2023-10-20 | End: 2023-10-20 | Stop reason: HOSPADM

## 2023-10-20 RX ORDER — DEXAMETHASONE SODIUM PHOSPHATE 4 MG/ML
INJECTION, SOLUTION INTRA-ARTICULAR; INTRALESIONAL; INTRAMUSCULAR; INTRAVENOUS; SOFT TISSUE AS NEEDED
Status: DISCONTINUED | OUTPATIENT
Start: 2023-10-20 | End: 2023-10-20

## 2023-10-20 RX ADMIN — SODIUM CHLORIDE, POTASSIUM CHLORIDE, SODIUM LACTATE AND CALCIUM CHLORIDE: 600; 310; 30; 20 INJECTION, SOLUTION INTRAVENOUS at 09:28

## 2023-10-20 RX ADMIN — MIDAZOLAM 2 MG: 1 INJECTION INTRAMUSCULAR; INTRAVENOUS at 09:34

## 2023-10-20 RX ADMIN — PROPOFOL 50 MG: 10 INJECTION, EMULSION INTRAVENOUS at 11:15

## 2023-10-20 RX ADMIN — PROPOFOL 50 MG: 10 INJECTION, EMULSION INTRAVENOUS at 11:02

## 2023-10-20 RX ADMIN — GLYCOPYRROLATE 0.2 MG: 0.2 INJECTION INTRAMUSCULAR; INTRAVENOUS at 10:43

## 2023-10-20 RX ADMIN — FENTANYL CITRATE 50 MCG: 50 INJECTION, SOLUTION INTRAMUSCULAR; INTRAVENOUS at 11:09

## 2023-10-20 RX ADMIN — LIDOCAINE HYDROCHLORIDE 60 MG: 20 INJECTION INTRAVENOUS at 09:38

## 2023-10-20 RX ADMIN — LIDOCAINE HYDROCHLORIDE 40 MG: 20 INJECTION INTRAVENOUS at 09:40

## 2023-10-20 RX ADMIN — ROCURONIUM BROMIDE 20 MG: 10 INJECTION, SOLUTION INTRAVENOUS at 09:49

## 2023-10-20 RX ADMIN — CEFTRIAXONE SODIUM 1 G: 1 INJECTION, POWDER, FOR SOLUTION INTRAMUSCULAR; INTRAVENOUS at 09:55

## 2023-10-20 RX ADMIN — PROPOFOL 30 MG: 10 INJECTION, EMULSION INTRAVENOUS at 10:53

## 2023-10-20 RX ADMIN — PROPOFOL 150 MG: 10 INJECTION, EMULSION INTRAVENOUS at 09:38

## 2023-10-20 RX ADMIN — ROCURONIUM BROMIDE 10 MG: 10 INJECTION, SOLUTION INTRAVENOUS at 10:57

## 2023-10-20 RX ADMIN — PROPOFOL 50 MG: 10 INJECTION, EMULSION INTRAVENOUS at 11:10

## 2023-10-20 RX ADMIN — PROPOFOL 50 MG: 10 INJECTION, EMULSION INTRAVENOUS at 11:06

## 2023-10-20 RX ADMIN — FENTANYL CITRATE 50 MCG: 50 INJECTION, SOLUTION INTRAMUSCULAR; INTRAVENOUS at 09:37

## 2023-10-20 RX ADMIN — SUGAMMADEX 200 MG: 100 INJECTION, SOLUTION INTRAVENOUS at 11:25

## 2023-10-20 RX ADMIN — PROPOFOL 30 MG: 10 INJECTION, EMULSION INTRAVENOUS at 10:06

## 2023-10-20 RX ADMIN — SODIUM CHLORIDE, POTASSIUM CHLORIDE, SODIUM LACTATE AND CALCIUM CHLORIDE 100 ML/HR: 600; 310; 30; 20 INJECTION, SOLUTION INTRAVENOUS at 12:01

## 2023-10-20 RX ADMIN — KETOROLAC TROMETHAMINE 30 MG: 30 INJECTION, SOLUTION INTRAMUSCULAR at 11:17

## 2023-10-20 RX ADMIN — PROPOFOL 20 MG: 10 INJECTION, EMULSION INTRAVENOUS at 11:19

## 2023-10-20 RX ADMIN — DEXAMETHASONE SODIUM PHOSPHATE 8 MG: 4 INJECTION, SOLUTION INTRA-ARTICULAR; INTRALESIONAL; INTRAMUSCULAR; INTRAVENOUS; SOFT TISSUE at 09:45

## 2023-10-20 RX ADMIN — PROPOFOL 20 MG: 10 INJECTION, EMULSION INTRAVENOUS at 09:51

## 2023-10-20 RX ADMIN — PROPOFOL 30 MG: 10 INJECTION, EMULSION INTRAVENOUS at 10:50

## 2023-10-20 RX ADMIN — ONDANSETRON 4 MG: 2 INJECTION INTRAMUSCULAR; INTRAVENOUS at 11:19

## 2023-10-20 RX ADMIN — PROPOFOL 20 MG: 10 INJECTION, EMULSION INTRAVENOUS at 10:57

## 2023-10-20 RX ADMIN — ROCURONIUM BROMIDE 30 MG: 10 INJECTION, SOLUTION INTRAVENOUS at 09:43

## 2023-10-20 SDOH — HEALTH STABILITY: MENTAL HEALTH: CURRENT SMOKER: 0

## 2023-10-20 ASSESSMENT — PAIN SCALES - GENERAL
PAINLEVEL_OUTOF10: 0 - NO PAIN

## 2023-10-20 ASSESSMENT — PAIN - FUNCTIONAL ASSESSMENT
PAIN_FUNCTIONAL_ASSESSMENT: 0-10

## 2023-10-20 ASSESSMENT — COLUMBIA-SUICIDE SEVERITY RATING SCALE - C-SSRS
2. HAVE YOU ACTUALLY HAD ANY THOUGHTS OF KILLING YOURSELF?: NO
6. HAVE YOU EVER DONE ANYTHING, STARTED TO DO ANYTHING, OR PREPARED TO DO ANYTHING TO END YOUR LIFE?: NO
1. IN THE PAST MONTH, HAVE YOU WISHED YOU WERE DEAD OR WISHED YOU COULD GO TO SLEEP AND NOT WAKE UP?: NO

## 2023-10-20 NOTE — ANESTHESIA PROCEDURE NOTES
Airway  Date/Time: 10/20/2023 9:49 AM  Urgency: elective    Airway not difficult    Staffing  Performed: resident   Authorized by: Maya Baptiste MD    Performed by: Susan Combs DO  Patient location during procedure: OR    Indications and Patient Condition  Indications for airway management: anesthesia  Spontaneous ventilation: present  Sedation level: deep  Preoxygenated: yes  Patient position: sniffing  Mask difficulty assessment: 2 - vent by mask + OA or adjuvant +/- NMBA  Planned trial extubation    Final Airway Details  Final airway type: endotracheal airway      Successful airway: ETT  Cuffed: yes   Successful intubation technique: direct laryngoscopy  Facilitating devices/methods: intubating stylet  Endotracheal tube insertion site: oral  Blade: Dulce  Blade size: #4  ETT size (mm): 7.5  Cormack-Lehane Classification: grade I - full view of glottis  Placement verified by: capnometry and palpation of cuff   Measured from: teeth  ETT to teeth (cm): 22  Number of attempts at approach: 1

## 2023-10-20 NOTE — INTERVAL H&P NOTE
H&P reviewed. The patient was examined and there are no changes to the H&P. Patient on abx for recent positive UA. Last ESR 16. Will discuss addition of possible laparoscopy to assess for free fluid.

## 2023-10-20 NOTE — ANESTHESIA POSTPROCEDURE EVALUATION
Patient: Jose Fernandez    Procedure Summary       Date: 10/20/23 Room / Location: [facility] OR 06 / Virtual [facility] OR    Anesthesia Start: 0922 Anesthesia Stop: 1148    Procedures:       Cystoscopy, Bladder Botox Cystogram; Looposcopy and loopogram      Attempted Exploration Laparoscopy (Abdomen) Diagnosis:       Neuromuscular dysfunction of bladder, unspecified      (Neuromuscular dysfunction of bladder, unspecified [N31.9])    Surgeons: Adam Vargas MD Responsible Provider: Maya Baptiste MD    Anesthesia Type: MAC ASA Status: 3            Anesthesia Type: MAC    Vitals Value Taken Time   /54 10/20/23 1200   Temp 36.2 °C (97.2 °F) 10/20/23 1130   Pulse 76 10/20/23 1202   Resp 10 10/20/23 1202   SpO2 96 % 10/20/23 1202   Vitals shown include unvalidated device data.    Anesthesia Post Evaluation    No notable events documented.

## 2023-10-20 NOTE — OP NOTE
Cystoscopy, Bladder Botox Cystogram, Attempted Exploration Laparoscopy Operative Note     Date: 10/20/2023  OR Location: [facility] OR    Name: Jose Fernandez : 2000, Age: 23 y.o., MRN: [id number], Sex: male    Diagnosis  Pre-op Diagnosis     * Neuromuscular dysfunction of bladder, unspecified [N31.9] Post-op Diagnosis     * Neuromuscular dysfunction of bladder, unspecified [N31.9]     Procedures  Cystoscopy, Bladder Botox Cystogram; Looposcopy and loopogram  78412 - [place] CYSTOURETHROSCOPY INJ CHEMODENERVATION BLADDER    Attempted Exploration Laparoscopy  Diagnostic laparoscopy.     Surgeons      * Adam Vargas - Primary    Resident/Fellow/Other Assistant:  Surgeon(s) and Role:     * Reynold Kaba MD - Resident - Assisting    Procedure Summary  Anesthesia: General  ASA: ASA status not filed in the log.  Anesthesia Staff: Anesthesiologist: Maya Baptiste MD  C-AA: CAITLIN Collins  Anesthesia Resident: Susan Combs DO  Estimated Blood Loss: 2mL  Intra-op Medications: * No intraprocedure medications in log *           Anesthesia Record               Intraprocedure I/O Totals       None           Specimen: No specimens collected     Staff:   Circulator: Mae De Luna, KATHLEEN; Phillip Llanes, KATHLEEN  Scrub Person: Margret Flores RN; Carolyne Villasenor, KATHLEEN         Drains and/or Catheters: * None in log *    Tourniquet Times:         Implants:     Findings: No explicit extrav from bladder on cystogram. Several deep (but blind ending) defect suspected due to cath trauma in bladder     Indications: Jose Fernandez is an 23 y.o. male who is having surgery for Neuromuscular dysfunction of bladder, unspecified [N31.9]. Recurrent abdominal abscesses     Patient consented to the procedure in the preoperative area. Risks and benefits were reviewed. Questions were answered. Patient allergies were reviewed.    Patient was brought to the OR and placed in supine position on the OR table. A timeout was  performed. All were in agreement. Preoperative antibiotics were administered. Patient underwent anesthesia without complication then was repositioned in dorsal lithotomy. Patient was prepped and draped in the usual sterile fashion.     A 21 Fr rigid cystoscope was used to perform cystourethroscopy. Bladder was noted to have no masses or stones. UOs were in normal orthotopic position. Cath channel was seen and appeared healthy. There were several blind ending but deep defects in the bladder. The scope was removed. A 16 fr boothe was inserted.  images were obtained. 300cc of contrast mixed with saline was injected. There was no clear extrav of urine in the 3 views and post emptying.     A 10fr catheter was inserted into his mitrofanoff and contrast was injected showing no extrav or defects. A flexible ureteroscope was traversed across the mitrofanoff with no suspicious areas, strictures or defects seen. The scope was used to probe the bladder defects which were blind ending.     Gloves and gowns were changed and we moved to the abdomen for a exploratory laparoscopy. A 5 mm incision was made below his prior G-tube site. A veres needle was inserted. A saline drop test was performed. A 5mm port was inserted but when the 5mm 30 degree camera was inserted we were in an adhesion. The port was removed under vision and there was no bleeding. The procedure was aborted as the risk of repeat attempted port placements outweighed the benefit of laparoscopy at this time due to his extensive surgical history. The port site was closed with silk with two simple inturrupted stitches. The boothe catheter was removed.       This concluded the procedure. Patient was awoken form anesthesia without complication and was transferred to PACU in stable condition.      Complications:  None; patient tolerated the procedure well.    Disposition: PACU - hemodynamically stable.  Condition: stable         Attending Attestation:   I was present for  the entirety of the procedure(s).     MD Adam Garnett  Phone Number: [phone]

## 2023-10-20 NOTE — ANESTHESIA PREPROCEDURE EVALUATION
Patient: Jose Fernandez    Procedure Information       Date/Time: 10/20/23 0910    Procedure: Cystoscopy, Bladder Botox Cystogram    Location: [facility] OR 06 / Virtual [facility] OR    Surgeons: Adam Vargas MD            Relevant Problems   Cardiovascular   (+) Arthralgia of right acromioclavicular joint      GI   (+) Gastroesophageal reflux      /Renal   (+) Recurrent UTI      Pulmonary   (+) Asthma, mild persistent   (+) Restrictive lung disease      Hematology   (+) Anemia      Musculoskeletal   (+) Neuromuscular scoliosis      Infectious Disease   (+) Intra-abdominal infection   (+) Recurrent UTI       Clinical information reviewed:   Tobacco  Allergies  Meds   Med Hx  Surg Hx   Fam Hx  Soc Hx        NPO Detail:  NPO/Void Status  Carbonhydrate Drink Given Prior to Surgery? : N  Date of Last Liquid: 10/20/23  Time of Last Liquid: 0500  Date of Last Solid: 10/19/23  Time of Last Solid: 2000  Last Intake Type: Clear fluids  Time of Last Void: 0600         Physical Exam    Airway  Mallampati: II     Cardiovascular    Dental - normal exam     Pulmonary    Abdominal            Anesthesia Plan    ASA 3     general     The patient is not a current smoker.    intravenous induction   Postoperative administration of opioids is intended.  Trial extubation is planned.  Anesthetic plan and risks discussed with patient.  Use of blood products discussed with patient who consented to blood products.

## 2023-10-20 NOTE — ANESTHESIA PROCEDURE NOTES
Peripheral IV  Date/Time: 10/20/2023 9:04 AM  Inserted by: Susan Combs DO    Placement  Needle size: 20 G  Laterality: left  Location: hand  Local anesthetic: none  Site prep: alcohol  Technique: anatomical landmarks  Attempts: 1

## 2023-10-23 ASSESSMENT — PAIN SCALES - GENERAL: PAINLEVEL_OUTOF10: 0 - NO PAIN

## 2023-10-24 DIAGNOSIS — N39.0 RECURRENT UTI: Primary | ICD-10-CM

## 2023-10-24 DIAGNOSIS — N31.9 NEUROGENIC BLADDER: ICD-10-CM

## 2023-11-09 ENCOUNTER — APPOINTMENT (OUTPATIENT)
Dept: PEDIATRIC PULMONOLOGY | Facility: CLINIC | Age: 23
End: 2023-11-09
Payer: COMMERCIAL

## 2023-11-14 ENCOUNTER — LAB (OUTPATIENT)
Dept: LAB | Facility: LAB | Age: 23
End: 2023-11-14
Payer: COMMERCIAL

## 2023-11-14 DIAGNOSIS — N39.0 RECURRENT UTI: ICD-10-CM

## 2023-11-14 PROCEDURE — 87086 URINE CULTURE/COLONY COUNT: CPT

## 2023-11-14 PROCEDURE — 87186 SC STD MICRODIL/AGAR DIL: CPT

## 2023-11-15 ENCOUNTER — OFFICE VISIT (OUTPATIENT)
Dept: UROLOGY | Facility: CLINIC | Age: 23
End: 2023-11-15
Payer: COMMERCIAL

## 2023-11-15 VITALS
SYSTOLIC BLOOD PRESSURE: 93 MMHG | TEMPERATURE: 97.8 F | HEIGHT: 69 IN | DIASTOLIC BLOOD PRESSURE: 57 MMHG | BODY MASS INDEX: 28.14 KG/M2 | WEIGHT: 190 LBS | HEART RATE: 69 BPM

## 2023-11-15 DIAGNOSIS — N39.0 RECURRENT UTI: Primary | ICD-10-CM

## 2023-11-15 DIAGNOSIS — Z93.52: ICD-10-CM

## 2023-11-15 DIAGNOSIS — N31.9 NEUROGENIC BLADDER: ICD-10-CM

## 2023-11-15 PROCEDURE — 1036F TOBACCO NON-USER: CPT | Performed by: STUDENT IN AN ORGANIZED HEALTH CARE EDUCATION/TRAINING PROGRAM

## 2023-11-15 PROCEDURE — 99214 OFFICE O/P EST MOD 30 MIN: CPT | Performed by: STUDENT IN AN ORGANIZED HEALTH CARE EDUCATION/TRAINING PROGRAM

## 2023-11-15 RX ORDER — AMOXICILLIN AND CLAVULANATE POTASSIUM 875; 125 MG/1; MG/1
1 TABLET, FILM COATED ORAL 2 TIMES DAILY
Qty: 14 TABLET | Refills: 0 | Status: SHIPPED | OUTPATIENT
Start: 2023-11-15 | End: 2023-11-22

## 2023-11-15 RX ORDER — NITROFURANTOIN MACROCRYSTALS 50 MG/1
50 CAPSULE ORAL NIGHTLY
Qty: 30 CAPSULE | Refills: 5 | Status: SHIPPED | OUTPATIENT
Start: 2023-11-22 | End: 2024-05-20

## 2023-11-15 NOTE — PROGRESS NOTES
Jose presents for a post-op evaluation.  The patient’s EMR has been reviewed.  Lives in [place], [place]  Accompanied by Mother whom provides additional history.  Previously followed by Pediatric Urology with Dr. Wokrman.     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  Now with myelomalacia, hemiparesis, neurogenic bowel and bladder.  Hx of recurrent UTIs s/p mitrofanoff with Dr. Myles, now experiencing recurrent intra-abdominal abscess.     Recently hospitalized for a week requiring PICU admission for an intraabdominal infection. He was evaluated by Dr. Workman while inpatient, had CT imaging showing potential perforation of his bladder. They wonder if this was 2/2 ETOH use and bladder overfilling faster than normal right before admission. This has been chronic problem, however cystogram imaging has been inconclusive as there is a possibility of having a bladder diverticulum vs micro-perforations.      UDS reviewed, bladder capacity appears in the 300-400ml range which should be well managed with his current CIC regimen and oral medications. Numerous CT and MRI images reviewed going back to 2021, possible diverticula vs. thin area seen in the bladder dome, small right posterior diverticulum, nothing significant, most recent CT without clear/definitive perforation obvious.    Now, s/p cysto, bladder botox and exp lap with me (10/20/23)  Ex-lap unsuccessful due to intraabdominal adhesions  Cystoscopy, cystogram, and looposcopy/loopogram did not reveal any perforation. There were well-healed areas of injury in the posterior wall of the bladder near the Mitrofanoff entry point that I feel are likely due to injuries with catheterization   We discussed findings in detail again today    SUBJECTIVE: HPI   TODAY (11/15/23)  Accompanied by Mother whom provides additional history.  Concerned that he may have a UTI given his malodorous urine.  Reports having about 3x UTI's in the past 6 months.    Not currently on antibiotics for prophylaxis.     S/p courses of keflex, amikacin and gentamicin washes for prophylaxis.   Tolerated levofloxacin with benadryl in the past.   Has tolerated macrobid in the past as well.   Continues CIC, no issues.   Feel botox is working  Cathing for more volumes and less of an interval, less leakage in pull-up     Notes he appears to be more constipated since the procedure.   Gradually improving post-operatively.     TO REVIEW: Last evaluation (09/06/23)  Doing well s/p latest hospitalization for an intraabdominal infection.  Continues flagyl and ceftriaxone for treatment. Followed by ID.  ID is concerned given his ESR remains elevated.  Plans to undergo further imaging.  Denies any recent infections.     Continues CIC via mitrafanoff, without significant issue.  C/o persistent OAB symptoms.   No significant benefit with myrbetriq.  Denies any side-effects.     TO REVIEW: (08/23/23)  Myrbetriq helping, denies any side-effects.  Doing well s/p latest hospitalization, still has indwelling urethral catheter  Evaluated by ID yesterday, continues flagyl and ceftriaxone for treatment.  Continues to cath mitrofanoff once daily to promote patency without issue  Denies any gross hematuria, flank pain, recent fevers or chills.      TO REVIEW: Initial evaluation (07/27/23)  Recently treated for sepsis 2/2 a MSSA R gluteal abscess. (March 2023)  Followed by Pediatric surgery (Dr. Resendiz) for this, s/p vessel loop placement (04/17/23).    Underwent a post-op MRI (05/23/23) 2/2 concerns of infectious recurrence.  MRI was negative for osteomyelitis but did show residual fluid in the abdomen.   Described as phlegmon. Currently he is symptom free and completed antibiotics.     Continues to follow up with pediatric ID, with Dr. Lyric Vargas.   Initially, he was performing gentamicin washes s/p mitrafanoff.   Transitioned to amikacin bladder washes, however discontinued 2/2 adverse reactions.  Discontinued this about 1-2 months ago.     Continues to cath his stoma throughout the day.  Mother caths via his penis once before bed and in the morning to fully drain his bladder.   Denies having to irrigate or any significant mucus with this.  Occasionally, she will irrigate if he has a UTI.  He denies any recent UTIs.  Constipation has been under control.      At baseline, he leaks occasionally.   Albeit, he states this has been much improved since taking max dose oxybutynin.  His OAB symptoms and leakage are not too bothersome at this time.  He does not feel he requires additional treatment for this right now.  But he may reconsider this if his symptoms worsen.      PSHx: Multiple gastric surgeries (3x for closure of gastrocutaneous fistula, feeding gastrostomy placement). Hx of baclofen pump.   Allergies: Bactrim (anaphylaxis), vancomycin (rash), ciprofloxacin (urticaria    Past medical, surgical, family and social history in the chart was reviewed and is accurate including any additions to what is in this HPI.    Review of Systems   Constitutional: denies any unintentional weight loss or change in strength.  Integumentary: denies any rashes or pruritus.  Eyes: denies any double vision or eye pain.  Ear/Nose/Mouth/Throat: denies any nosebleeds or gum bleeds.  Cardiovascular: denies any chest pain or syncope.  Respiratory: denies hemoptysis.  Gastrointestinal: denies nausea or vomiting.  Musculoskeletal: denies muscle cramping or weakness.  Neurologic: denies convulsions or seizures.  Hematologic/Lymphatic: denies bleeding tendencies.  Endocrine: denies heat/cold intolerance.  All other systems have been reviewed and are negative unless otherwise noted in the HPI.    OBJECTIVE:  Visit Vitals  BP 93/57 (BP Location: Right arm, Patient Position: Sitting, BP Cuff Size: Large adult)   Pulse 69   Temp 36.6 °C (97.8 °F) (Temporal)     Physical Exam   Constitutional: No obvious distress.  Eyes: Non-injected  conjunctiva, sclera clear, EOMI.  Ears/Nose/Mouth/Throat: No obvious drainage per ears or nose.  Cardiovascular: Extremities are warm and well perfused. No edema, cyanosis or pallor.  Respiratory: No audible wheezing/stridor; respirations do not appear labored.  Gastrointestinal: Abdomen soft, not distended.  Musculoskeletal: Normal ROM of extremities.  Skin: No obvious rashes or open sores.  Neurologic: Alert and oriented, CN 2-12 grossly intact.  Psychiatric: Answers questions appropriately with normal affect.  Hematologic/Lymphatic/Immunologic: No obvious bruises or sites of spontaneous bleeding.  Genitourinary: No CVA tenderness, bladder not palpable.     Labs and imaging:  Lab Results   Component Value Date    WBC 8.6 10/04/2023    HGB 14.8 10/04/2023    HCT 44.1 10/04/2023     10/04/2023    CHOL 155 03/04/2022    TRIG 121 03/04/2022    HDL 36.0 (A) 03/04/2022    ALT 14 08/22/2023    AST 17 08/22/2023     10/12/2023    K 4.0 10/12/2023     10/12/2023    CREATININE 0.50 10/12/2023    BUN 8 10/12/2023    CO2 26 10/12/2023    TSH 1.10 03/04/2022    INR 1.3 (H) 08/11/2023     ASSESSMENT:  Problem List Items Addressed This Visit       Neurogenic bladder    Relevant Medications    nitrofurantoin (Macrodantin) 50 mg capsule (Start on 11/22/2023)    Recurrent UTI - Primary    Relevant Medications    nitrofurantoin (Macrodantin) 50 mg capsule (Start on 11/22/2023)      PLAN:  Treat empirically with course of Augmentin for presumed UTI.  Followed by 50 mg daily macrodantin for UTI prophylaxis.   Will continue ppx course for 6 months.   Encouraged to call if he develops any UTI or acute//worsening symptoms.   Encouraged to call if he develops issues with cathing, retention or leakage.   RTC in 3 months for reassessment, can consider 300u botox at next procedure when this is indicated    All questions were answered to the patient’s satisfaction.  Patient agrees with the plan and wishes to  proceed.  Follow-up will be scheduled appropriately.     Scribed for Dr. Adam Vargas by [employer].  I, Dr. Adam Vargas have personally reviewed and agreed with the information entered by the Virtual Scribe. 11/15/23.

## 2023-11-17 DIAGNOSIS — N39.0 URINARY TRACT INFECTION WITHOUT HEMATURIA, SITE UNSPECIFIED: ICD-10-CM

## 2023-11-17 LAB — BACTERIA UR CULT: ABNORMAL

## 2023-11-17 RX ORDER — NITROFURANTOIN 25; 75 MG/1; MG/1
100 CAPSULE ORAL 2 TIMES DAILY
Qty: 28 CAPSULE | Refills: 0 | Status: SHIPPED | OUTPATIENT
Start: 2023-11-17 | End: 2023-12-01

## 2023-12-11 DIAGNOSIS — K21.9 GASTROESOPHAGEAL REFLUX DISEASE WITHOUT ESOPHAGITIS: ICD-10-CM

## 2023-12-12 RX ORDER — FAMOTIDINE 40 MG/1
40 TABLET, FILM COATED ORAL 2 TIMES DAILY
Qty: 60 TABLET | Refills: 3 | Status: SHIPPED | OUTPATIENT
Start: 2023-12-12 | End: 2024-01-08 | Stop reason: SDUPTHER

## 2024-01-03 DIAGNOSIS — N39.45 URINARY INCONTINENCE WITH CONTINUOUS LEAKAGE: Primary | ICD-10-CM

## 2024-01-04 RX ORDER — MIRABEGRON 50 MG/1
50 TABLET, FILM COATED, EXTENDED RELEASE ORAL DAILY
Qty: 30 TABLET | Refills: 11 | Status: SHIPPED | OUTPATIENT
Start: 2024-01-04

## 2024-01-08 ENCOUNTER — OFFICE VISIT (OUTPATIENT)
Dept: PEDIATRIC GASTROENTEROLOGY | Facility: CLINIC | Age: 24
End: 2024-01-08
Payer: COMMERCIAL

## 2024-01-08 VITALS
BODY MASS INDEX: 29.24 KG/M2 | HEART RATE: 80 BPM | OXYGEN SATURATION: 100 % | WEIGHT: 198 LBS | DIASTOLIC BLOOD PRESSURE: 73 MMHG | TEMPERATURE: 98.6 F | SYSTOLIC BLOOD PRESSURE: 122 MMHG

## 2024-01-08 DIAGNOSIS — K59.2 NEUROGENIC BOWEL: ICD-10-CM

## 2024-01-08 DIAGNOSIS — K59.09 CHRONIC CONSTIPATION: ICD-10-CM

## 2024-01-08 DIAGNOSIS — K21.9 GASTROESOPHAGEAL REFLUX DISEASE WITHOUT ESOPHAGITIS: ICD-10-CM

## 2024-01-08 DIAGNOSIS — K21.00 GASTROESOPHAGEAL REFLUX DISEASE WITH ESOPHAGITIS, UNSPECIFIED WHETHER HEMORRHAGE: Primary | ICD-10-CM

## 2024-01-08 PROCEDURE — 99214 OFFICE O/P EST MOD 30 MIN: CPT | Performed by: NURSE PRACTITIONER

## 2024-01-08 PROCEDURE — 1036F TOBACCO NON-USER: CPT | Performed by: NURSE PRACTITIONER

## 2024-01-08 RX ORDER — DOCUSATE SODIUM 100 MG/1
100 CAPSULE, LIQUID FILLED ORAL DAILY
Qty: 60 CAPSULE | Refills: 11 | Status: SHIPPED | OUTPATIENT
Start: 2024-01-08 | End: 2025-01-07

## 2024-01-08 RX ORDER — POLYETHYLENE GLYCOL 3350 17 G/17G
17 POWDER, FOR SOLUTION ORAL DAILY
Qty: 510 G | Refills: 11 | Status: SHIPPED | OUTPATIENT
Start: 2024-01-08 | End: 2025-01-07

## 2024-01-08 RX ORDER — FAMOTIDINE 40 MG/1
40 TABLET, FILM COATED ORAL 2 TIMES DAILY
Qty: 60 TABLET | Refills: 11 | Status: SHIPPED | OUTPATIENT
Start: 2024-01-08

## 2024-01-08 NOTE — PATIENT INSTRUCTIONS
Jose Fernandez is an 23 year old male with lower body paralysis, neurogenic bowel.   He is followed by GI fro chronic Bowel Management with Peristeen which he has been doing well with for several years.     If needed:  Mix  In MirALX 1-2 caps with the irrigant.   Convert to NS irrigation for a couple days.   Increase the colace from 100 - 400 mg     Continue Famotidine for Acid Reflux.     Follow up in one  year     CONTACT:  Division of Pediatric Gastroenterology, Hepatology and Nutrition  All results will be on line on My Chart.  Make sure sure you have signed up for My Chart.     Office phone   Office fax   Email [email or web address]     Please note:  After hours and on call [phone] and ask for Pediatric Gastroenterology Fellow on Call  Office visit Scheduling   Radiology Scheduling      I am in clinic M, T, W and may not be able to return call until Thursday.   Phone calls and email to our office are returned by one of our nurses within 48 business hours.  Please call for prescription renewals when you have one week of medication remaining.   Please call if you have trouble with insurance company coverage of any medications we prescribe.

## 2024-01-08 NOTE — PROGRESS NOTES
"Pediatric Gastroenterology Follow Up Office Visit      HPI  Jose Fernandezand his caregiver were seen in the [facility]enter[facility] Hepatology & Nutrition [facility] in follow-up on 1/8/2024. Jose Fernandez is a 23 y.o. year-old  who is being followed by Pediatric Gastroenterology for neurogenic bowel . 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  Today he is accompanied by his mother and his girlfriend.    Clinical Status - Good  Since Last Visit - he had PICU stay for urosepsis in Fall 2023. Recovered   Hospital Follow up - No    Now to Dr. Vargas (Adult urology)  Has Mitrofanoff.   Botox is working  More UTI - breakthrough UTI despite prophylaxis.  Using probiotic.     Peristeen anal irrigation system:   Irrigation 800 - 900 tap water.   Once or twice a day - has been needing more \"double flushes\" lately.   Recently had a few accidents - thinks for antibiotic for UTI.    Takes Colace 100 - 200 mg by mouth daily.   Takes MiraLAX once a day.      Denies nausea, vomiting, reflux and regurgitation. Has a Nissen. Has been on Pepcid for several years (and Zantac prior) twice a day with good relief.      Works at [employer] but not involved in sports right now. Eats fast food, eats quickly. knows that he needs to make some changes.     Abdominal Pain - none  Nausea - none  Vomiting - none  Reflux/Regurgitation - none  Dysphagia  - none    No Changes to Family Medical History      Allergies   Allergen Reactions    Ciprofloxacin Anaphylaxis    Sutures Other     Patients mother states patient reacts to dissolveable suture     Fish Containing Products Hives    Other Other    Shellfish Containing Products Hives    Sulfamethoxazole-Trimethoprim Unknown    Vancomycin Other     Red Man's syndrome          Current Outpatient Medications on File Prior to Visit   Medication Sig Dispense Refill    albuterol 2.5 mg /3 mL (0.083 %) nebulizer solution Inhale. Inhale one " vial every 4-6 hours as needed for cough, wheezing and shortness of breath      budesonide-formoteroL (Symbicort) 160-4.5 mcg/actuation inhaler INHALE 2 PUFFS every 4-6 hours as needed for prolonged cough, shortness of breath, and wheeze as well as 2 puffs once daily. MAX 12 puffs per day.      cholecalciferol (Vitamin D-3) 50 mcg (2,000 unit) capsule TAKE 1 CAPSULE Daily      cyanocobalamin (Vitamin B-12) 1,000 mcg tablet TAKE 1 TABLET DAILY AS DIRECTED.      docusate sodium (Colace) 100 mg capsule Take 1 capsule (100 mg) by mouth once daily.      famotidine (Pepcid) 40 mg tablet Take 1 tablet (40 mg) by mouth 2 times a day. 60 tablet 3    ferrous sulfate ER (Slow Fe) 142 mg ER tablet Take 1 tablet by mouth once daily.      L. ACIDOPHILUS/BIFID. ANIMALIS ORAL Take 1 capsule by mouth once daily.      montelukast (Singulair) 10 mg tablet Take 1 tablet (10 mg) by mouth once daily.      Myrbetriq 50 mg tablet extended release 24 hr 24 hr tablet take 1 tablet by mouth once daily 30 tablet 11    nitrofurantoin (Macrodantin) 50 mg capsule Take 1 capsule (50 mg) by mouth once daily at bedtime. Do not start before November 22, 2023. 30 capsule 5    oxybutynin XL (Ditropan-XL) 15 mg 24 hr tablet Take 1 tablet (15 mg) by mouth once daily.      polyethylene glycol (Glycolax) 17 gram/dose powder MIX 1 CAPFUL (17GM) IN 8 OUNCES OF WATER, JUICE, OR TEA AND DRINK DAILY.      [DISCONTINUED] Myrbetriq 50 mg tablet extended release 24 hr 24 hr tablet Take 1 tablet (50 mg) by mouth once daily.       No current facility-administered medications on file prior to visit.           PHYSICAL EXAMINATION:  Vital signs : /73 (BP Location: Right arm, Patient Position: Sitting, BP Cuff Size: Adult)   Pulse 80   Temp 37 °C (98.6 °F) (Temporal)   Wt 89.8 kg (198 lb)   SpO2 100%   BMI 29.24 kg/m²  [unfilled] [unfilled] @[facility]@  [facility] age limit for growth %jessica is 20 years.    Physical Exam  Constitutional:       General: Appear well.  Comfortable in wheelchair  HENT:      Head: Normocephalic.      Right Ear: External ear normal.      Left Ear: External ear normal.      Nose: Nose normal.      Mouth/Throat:      Mouth: Mucous membranes are moist.   Eyes:      Extraocular Movements: Extraocular movements intact.      Conjunctiva/sclera: Conjunctivae normal.   Cardiovascular:      Rate and Rhythm: Normal rate and regular rhythm.      Heart sounds: Normal heart sounds.      Capillary Refill: Capillary refill takes less than 2 seconds.   Pulmonary:      Effort: Respiratory effort is normal.      Breath sounds: Normal breath sounds.   Abdominal:      General: Abdomen is flat. Bowel sounds are normal. There is no distension. There are no masses. Several scars     Palpations: Abdomen is soft.      Tenderness: There is no abdominal tenderness.      Gastrostomy tubes: N/A  Anal Rectal:     Not examined   Skin     General: Skin is warm and dry.      No rashes  Neurological:      Mental Status: Alert  Psychiatric:         Mood and Affect: Mood normal.        IMPRESSION AND PLAN:  Jose Fernandez is an 23 year old male with lower body paralysis, neurogenic bowel.   He is followed by GI fro chronic Bowel Management with [facility] which he has been doing well with for several years.     If needed:  Mix  In MirALX 1-2 caps with the irrigant.   Convert to NS irrigation for a couple days.   Increase the colace from 100 - 400 mg     Continue Famotidine for Acid Reflux.     Follow up in one  year     We discussed that can be seen in Peds GI for routine follow up but no admission to [facility] GI service or procedures after age 22 years.         CONTACT:  Division of Pediatric Gastroenterology, Hepatology and Nutrition  All results will be on line on My Chart.  Make sure sure you have signed up for My Chart.     Office phone   Office fax   Email [email or web address]     Please note:  After hours and on call [phone] and ask for Pediatric  Gastroenterology Fellow on Call  Office visit Scheduling   Radiology Scheduling      I am in clinic M, T, W and may not be able to return call until Thursday.   Phone calls and email to our office are returned by one of our nurses within 48 business hours.  Please call for prescription renewals when you have one week of medication remaining.   Please call if you have trouble with insurance company coverage of any medications we prescribe.

## 2024-01-11 ENCOUNTER — OFFICE VISIT (OUTPATIENT)
Dept: ORTHOPEDIC SURGERY | Facility: CLINIC | Age: 24
End: 2024-01-11
Payer: COMMERCIAL

## 2024-01-11 DIAGNOSIS — G82.50 QUADRIPLEGIA AND QUADRIPARESIS (MULTI): Primary | ICD-10-CM

## 2024-01-11 PROCEDURE — 62370 ANL SP INF PMP W/MDREPRG&FIL: CPT | Performed by: ORTHOPAEDIC SURGERY

## 2024-01-11 PROCEDURE — 99213 OFFICE O/P EST LOW 20 MIN: CPT | Performed by: ORTHOPAEDIC SURGERY

## 2024-01-11 PROCEDURE — 1036F TOBACCO NON-USER: CPT | Performed by: ORTHOPAEDIC SURGERY

## 2024-01-11 PROCEDURE — 2500000004 HC RX 250 GENERAL PHARMACY W/ HCPCS (ALT 636 FOR OP/ED): Mod: JZ

## 2024-01-11 RX ORDER — BACLOFEN 2000 UG/ML
100 INJECTION INTRATHECAL CONTINUOUS
Status: SHIPPED | OUTPATIENT
Start: 2024-01-11

## 2024-01-11 RX ADMIN — BACLOFEN 100 MCG/DAY: 2000 INJECTION INTRATHECAL at 11:01

## 2024-01-11 NOTE — PROGRESS NOTES
Chief Complaint: FUV ITB pump refill    History: 23 y.o. 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 Pump originally implanted on 4/16/19. Since most recent visit last June, he has been having more frequent episodes of spasticity in his legs. This has been especially evident while trying to use the restroom since receiving botox injection into his bladder in October. Otherwise he is doing well, continues to work at the [employer] and has started driving.    Physical Exam:  General: Well developed, well nourished male in no acute distress.  Skin: The skin is intact with no evidence of abrasions, bruises, or swelling. I can still see the deep sutures, but there are no signs of infection.  Vascular: There are 2+ pulses in both upper extremities and brisk capillary refill.  Neuro: The light touch sensation is mostly intact in both arms, but there are some less sensitive areas on the right secondary to the spinal cord injury. The AIN/PIN/Ulnar nerve function is intact in his left arm.     He sat well on the bed. He leans minimally to the right. His neurological examination remains stable. He has a long scar down his back from previous surgery as well as a well healed pump incision and there was no seroma in the pump pocket, although he continues to spit a few of the sutures. He also has no function of his legs and his right hand is nonfunctional. He had minimal spasticity on exam today or during the move from bed to chair.     Imaging that was personally reviewed: no new imaging today    Assessment/Plan: 23 y.o. 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 Today the ITB pump was interrogated, refilled, and reprogrammed. Under sterile conditions I refilled his baclofen pump with 20 mL of 2000 mcg/mL Lioresal. I expected 2.0 from  the reservoir and got 2.0 mL back. His daily dose is 194.2 mcg/day. He is due for a refill before 7/14/24. His CAP is at about 9:00. His ISAMAR is 24 months so plan for pump replacement in Fall 2025. He will need refill Kit #[id number] at his next refill. He can return prior to that time for dose increase if he wishes.

## 2024-01-11 NOTE — PATIENT INSTRUCTIONS
Today we interrogated, refilled, and reprogrammed the ITB pump. The skin overlying the pump was prepped with Betadine and draped using sterile technique. The refill port was identified with the template and cannulated using a non-coring 22 gauge needle. The residual volume of 2.2 mL was removed and discarded; the expected volume was 2.2 mL. The needle was removed and the pump was electronically reprogrammed. The new volume of 20 mL was entered and the drug concentration is 2000.  The current dose is 194.2 mcg/day.  The ISAMAR is 24 months.  The next refill must be before 7/14/24.  It needs kit # [id number].

## 2024-01-17 DIAGNOSIS — N39.0 RECURRENT UTI: Primary | ICD-10-CM

## 2024-01-23 ENCOUNTER — LAB (OUTPATIENT)
Dept: LAB | Facility: LAB | Age: 24
End: 2024-01-23
Payer: COMMERCIAL

## 2024-01-23 DIAGNOSIS — N31.9 NEUROGENIC BLADDER: ICD-10-CM

## 2024-01-23 DIAGNOSIS — N39.0 RECURRENT UTI: ICD-10-CM

## 2024-01-23 LAB
BASOPHILS # BLD AUTO: 0.02 X10*3/UL (ref 0–0.1)
BASOPHILS NFR BLD AUTO: 0.3 %
CRP SERPL-MCNC: 2.83 MG/DL
EOSINOPHIL # BLD AUTO: 0.03 X10*3/UL (ref 0–0.7)
EOSINOPHIL NFR BLD AUTO: 0.4 %
ERYTHROCYTE [DISTWIDTH] IN BLOOD BY AUTOMATED COUNT: 12 % (ref 11.5–14.5)
ERYTHROCYTE [SEDIMENTATION RATE] IN BLOOD BY WESTERGREN METHOD: 22 MM/H (ref 0–15)
HCT VFR BLD AUTO: 44.2 % (ref 41–52)
HGB BLD-MCNC: 15.4 G/DL (ref 13.5–17.5)
IMM GRANULOCYTES # BLD AUTO: 0.02 X10*3/UL (ref 0–0.7)
IMM GRANULOCYTES NFR BLD AUTO: 0.3 % (ref 0–0.9)
LYMPHOCYTES # BLD AUTO: 0.92 X10*3/UL (ref 1.2–4.8)
LYMPHOCYTES NFR BLD AUTO: 12 %
MCH RBC QN AUTO: 31 PG (ref 26–34)
MCHC RBC AUTO-ENTMCNC: 34.8 G/DL (ref 32–36)
MCV RBC AUTO: 89 FL (ref 80–100)
MONOCYTES # BLD AUTO: 0.7 X10*3/UL (ref 0.1–1)
MONOCYTES NFR BLD AUTO: 9.2 %
NEUTROPHILS # BLD AUTO: 5.95 X10*3/UL (ref 1.2–7.7)
NEUTROPHILS NFR BLD AUTO: 77.8 %
NRBC BLD-RTO: 0 /100 WBCS (ref 0–0)
PLATELET # BLD AUTO: 254 X10*3/UL (ref 150–450)
RBC # BLD AUTO: 4.97 X10*6/UL (ref 4.5–5.9)
WBC # BLD AUTO: 7.6 X10*3/UL (ref 4.4–11.3)

## 2024-01-23 PROCEDURE — 85025 COMPLETE CBC W/AUTO DIFF WBC: CPT

## 2024-01-23 PROCEDURE — 85652 RBC SED RATE AUTOMATED: CPT

## 2024-01-23 PROCEDURE — 86140 C-REACTIVE PROTEIN: CPT

## 2024-01-23 PROCEDURE — 87086 URINE CULTURE/COLONY COUNT: CPT

## 2024-01-23 PROCEDURE — 36415 COLL VENOUS BLD VENIPUNCTURE: CPT

## 2024-01-24 LAB — BACTERIA UR CULT: NO GROWTH

## 2024-01-26 NOTE — PROGRESS NOTES
Patient: Jose Fernandez    [id number]  : 2000 -- AGE 23 y.o.    Provider: Griselda Shea     Location [facility]   Service Date: 2024            [facility]  New Visit Note      HISTORY OF PRESENT ILLNESS     The patient's referring provider is: No ref. provider found Lyric Cameron from [place]     HISTORY OF PRESENT ILLNESS   Jose Fernandez is a 23 y.o. male who presents to a [facility] for an evaluation with concerns of reactive airway disease. I have independently interviewed and examined the patient in the office and reviewed available records.     I personally reviewed the note from Dr. Cameron notes from date May 2023 . This is contributing to my history, assessment, and plan:  This is a Dr. Cameron notes - this is a 22 yo with complicated medical history including underlying myelomalacia with hemiparesis, muscular weakness, restrictive lung disease, reactive airway disease/asthma, and history of respiratory insufficiency with BiPap supplementation at night, when he is ill - none in MANY years. Also remote history of tracheostomy; he was decannulated in 2005     He has hx peritonitis with associated pleural effusions. FVC is lower %62. FEV1 is stable around 70%. impaired airway clearance - would recommend something to use when sick - continue acapella PRN.        Current History    On today's visit, the patient reports as a child had a trach and ventilator as a child, he got removed about 4-5 years. He uses the nebs every 3 hours when he is sick. Denies SOB at rest. He is quadriplegic .    Denies orthopnea - he has baclofen pump as he can have spasticity, pnd, or matti.  Weight has been mostly stable.  Denies  chronic cough, denies wheezing, and denies clear, green, blood streaks. No night cough. No hemoptysis. No fever or shivering chills. Has no runny nose, or a tingling sensation in the back of his  throat. Also complains of heartburn - has pepcid twice a day. Denies chest pain or heartburn.     Previous pulmonary history: Has restrictive/reactive airway.     Inhalers/nebulized medications: symbicort - only using prn, albuterol nebulizer - when sick    Hospitalization History: Not been hospitalized over the last year for breathing related problem. PNA in 2021    Sleep history:  Denies snoring, apnea, feeling tired during the day or taking naps during the day.     ALLERGIES AND MEDICATIONS     ALLERGIES  Allergies   Allergen Reactions    Ciprofloxacin Anaphylaxis    Sutures Other     Patients mother states patient reacts to dissolveable suture     Fish Containing Products Hives    Other Other    Shellfish Containing Products Hives    Sulfamethoxazole-Trimethoprim Unknown    Vancomycin Other     Red Man's syndrome        MEDICATIONS  Current Outpatient Medications   Medication Sig Dispense Refill    albuterol 2.5 mg /3 mL (0.083 %) nebulizer solution Inhale. Inhale one vial every 4-6 hours as needed for cough, wheezing and shortness of breath      budesonide-formoteroL (Symbicort) 160-4.5 mcg/actuation inhaler INHALE 2 PUFFS every 4-6 hours as needed for prolonged cough, shortness of breath, and wheeze as well as 2 puffs once daily. MAX 12 puffs per day.      cholecalciferol (Vitamin D-3) 50 mcg (2,000 unit) capsule TAKE 1 CAPSULE Daily      cyanocobalamin (Vitamin B-12) 1,000 mcg tablet TAKE 1 TABLET DAILY AS DIRECTED.      docusate sodium (Colace) 100 mg capsule Take 1 capsule (100 mg) by mouth once daily. 60 capsule 11    famotidine (Pepcid) 40 mg tablet Take 1 tablet (40 mg) by mouth 2 times a day. 60 tablet 11    ferrous sulfate ER (Slow Fe) 142 mg ER tablet Take 1 tablet by mouth once daily.      L. ACIDOPHILUS/BIFID. ANIMALIS ORAL Take 1 capsule by mouth once daily.      montelukast (Singulair) 10 mg tablet Take 1 tablet (10 mg) by mouth once daily.      Myrbetriq 50 mg tablet extended release 24 hr 24 hr  tablet take 1 tablet by mouth once daily 30 tablet 11    nitrofurantoin (Macrodantin) 50 mg capsule Take 1 capsule (50 mg) by mouth once daily at bedtime. Do not start before November 22, 2023. 30 capsule 5    oxybutynin XL (Ditropan-XL) 15 mg 24 hr tablet Take 1 tablet (15 mg) by mouth once daily.      polyethylene glycol (Glycolax, Miralax) 17 gram/dose powder Take 17 g by mouth once daily. MIX IN 8 OUNCES OF WATER, JUICE, OR TEA AND DRINK DAILY. 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) (CMS/[facility]) (04/30/2020), Asthma, GERD (gastroesophageal reflux disease), Hemiplegia (CMS/[facility]), History of transfusion, Immunocompromised (CMS/[facility]), Intra-abdominal abscess (CMS/[facility]), Mitrofanoff appendicovesicostomy present (CMS/[facility]), MSSA (methicillin susceptible Staphylococcus aureus), Neurogenic bladder, Neurogenic bowel, Other conditions influencing health status (07/21/2013), Other conditions influencing health status (03/04/2022), Pectus excavatum (07/21/2013), Peritoneal abscess (CMS/[facility]) (05/24/2021), Personal history of diseases of the skin and subcutaneous tissue (05/17/2019), Personal history of diseases of the skin and subcutaneous tissue, Personal history of other diseases of the digestive system (04/13/2020), Personal history of other infectious and parasitic diseases (07/24/2019), Personal history of other infectious and parasitic diseases (09/24/2018), Recurrent UTI (urinary tract infection), Restrictive lung disease, Scoliosis, unspecified, Snoring, Tinea pedis (07/23/2019), UTI  (urinary tract infection), and Xerosis cutis (07/23/2019).    PAST SURGICAL HISTORY  Past Surgical History:   Procedure Laterality Date    ACHILLES TENDON SURGERY  10/30/2013    Subcutaneous Tenotomy Achilles Tendon Under Gen Anesthesia    BACLOFEN PUMP IMPLANTATION  2019    BRONCHOSCOPY      CYSTOSCOPY      GASTROSTOMY  10/30/2013    Gastric Surgery Feeding Gastrostomy s/p removal    NISSEN FUNDOPLICATION      2003    OTHER SURGICAL HISTORY  10/30/2013    Direct Laryngoscopy    OTHER SURGICAL HISTORY  10/30/2013    Closed Treatment Fx Of Proximal Femoral Neck W/ Manipulation    OTHER SURGICAL HISTORY  04/16/2015    Complex Repair Of Wound Trunk 2.6 To 7.5 Cm    OTHER SURGICAL HISTORY  10/14/2022    Circumcision    SPINAL FUSION      x2 2015, 2017    TRACHEOSTOMY TUBE PLACEMENT      s/p decannulation 2005    US GUIDED PERCUTANEOUS PERITONEAL OR RETROPERITONEAL FLUID COLLECTION DRAINAGE  04/08/2021    US GUIDED PERCUTANEOUS PERITONEAL OR RETROPERITONEAL FLUID COLLECTION DRAINAGE 4/8/2021 [facility]       IMMUNIZATION HISTORY  Immunization History   Administered Date(s) Administered    DTaP vaccine, pediatric  (INFANRIX) 2000, 2000, 2002, 06/06/2003, 09/28/2007    Flu vaccine (IIV4), preservative free *Check age/dose* 11/26/2014    Hep A, Unspecified 07/16/2010, 08/05/2011    Hep B, Unspecified 2002, 2002, 03/14/2003    HiB, unspecified 2002, 2002, 2002, 06/06/2003    Influenza, seasonal, injectable 10/17/2008, 10/02/2009, 10/13/2022    Influenza, seasonal, injectable, preservative free 08/28/2012    MMR vaccine, subcutaneous (MMR II) 03/14/2003, 09/28/2007    Meningococcal ACWY vaccine (MENVEO) 09/30/2020    Meningococcal B, Unspecified 09/30/2020    Meningococcal MCV4P 11/26/2014    Meningococcal MPSV4 11/26/2014    Moderna SARS-CoV-2 Vaccination 02/13/2021, 03/13/2021    Novel influenza-H1N1-09, preservative-free 11/24/2009, 01/15/2010    Pneumococcal  conjugate vaccine, 13-valent (PREVNAR 13) 2002, 2002, 03/14/2003, 06/06/2003, 10/17/2008    Pneumococcal polysaccharide vaccine, 23-valent, age 2 years and older (PNEUMOVAX 23) 03/04/2022    Polio, Unspecified 2002, 2002, 2002, 09/28/2007    Tdap vaccine, age 7 year and older (BOOSTRIX, ADACEL) 06/26/2014, 09/30/2020    Varicella vaccine, subcutaneous (VARIVAX) 03/14/2003, 09/28/2007       SOCIAL HISTORY  He  reports that he has never smoked. He has never used smokeless tobacco. He reports current alcohol use. He reports that he does not use drugs. He Patient     OCCUPATIONAL/ENVIRONMENTAL HISTORY  Previously worked as: disabled   DOES/DOES NOT EC: does not have known exposure to asbestos, silica, beryllium or inhaled metals.  DOES/DOES NOT EC: does not have exposure to birds or exotic animals.    FAMILY HISTORY  No family history on file.  DOES/DOES NOT EC: does not have a family history of pulmonary disease.  DOES/DOES NOT EC: does not have a family history of cancer.  DOES/DOES NOT EC: does not have a family history of autoimmune disorders.    RESULTS/DATA     Pulmonary Function Test Results        Media Information        Document Information    Procedure: Pulmonary Function Test   PFT/Spirometry   05/11/2023 00:00   Attached To:   Scanned Document on 5/11/23 with [employer] Conversion   Source Information    Conversion, Lizzette [employer] Scanned Document Links       Chest Radiograph     XR chest 1 view 08/08/2023    Narrative  Interpreted By:  RENEA FELIZ MD  MRN: [id number]  Patient Name: ELSI GERMAIN    STUDY:  CHEST 1 VIEW; ;  8/8/2023 11:41 am    INDICATION:  new O2 requirement and SOB .    COMPARISON:  08/06/2020.    ACCESSION NUMBER(S):  [id number]    ORDERING CLINICIAN:  CLOVIS BINGHAM    FINDINGS:    Impression  No significant changes.    Low lung volumes with bronchopulmonary crowding.    No focal consolidation.    No pleural effusion or pneumothorax seen.    Cardiac silhouette  "normal in size.    Stable postop changes of the spine fusion.      MACRO:  None      Chest CT Scan     No testing done      Echocardiogram     No testing done         REVIEW OF SYSTEMS     REVIEW OF SYSTEMS  Review of Systems   All other systems reviewed and are negative.        PHYSICAL EXAM     VITAL SIGNS: There were no vitals taken for this visit. /62 (BP Location: Right arm, Patient Position: Sitting, BP Cuff Size: Adult)   Pulse 79   Temp 36.5 °C (97.7 °F) (Temporal)   Resp 16   Ht 1.753 m (5' 9\")   Wt 88.5 kg (195 lb)   SpO2 96%   BMI 28.80 kg/m²      CURRENT WEIGHT: [unfilled]  BMI: [unfilled]  PREVIOUS WEIGHTS:  Wt Readings from Last 3 Encounters:   01/08/24 89.8 kg (198 lb)   11/15/23 86.2 kg (190 lb)   10/12/23 86.2 kg (190 lb)       Physical Exam  Constitutional:       Appearance: Normal appearance.   HENT:      Head: Normocephalic and atraumatic.      Right Ear: External ear normal.      Left Ear: External ear normal.      Nose: Nose normal.   Eyes:      Extraocular Movements: Extraocular movements intact.      Conjunctiva/sclera: Conjunctivae normal.      Pupils: Pupils are equal, round, and reactive to light.   Cardiovascular:      Rate and Rhythm: Normal rate and regular rhythm.      Pulses: Normal pulses.      Heart sounds: Normal heart sounds.   Pulmonary:      Effort: Pulmonary effort is normal.      Breath sounds: Normal breath sounds.   Musculoskeletal:         General: Normal range of motion.      Cervical back: Normal range of motion and neck supple.      Comments: Upper extremity   Skin:     General: Skin is warm and dry.   Neurological:      General: No focal deficit present.      Mental Status: He is alert and oriented to person, place, and time. Mental status is at baseline.      Comments: In a wheel chair quadrapelgic   Psychiatric:         Mood and Affect: Mood normal.         Behavior: Behavior normal.         Thought Content: Thought content normal.         Judgment: Judgment " normal.           ASSESSMENT/PLAN     Mr. Fernandez is a 23 y.o. 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) (CMS/[facility]) (04/30/2020), Asthma, GERD (gastroesophageal reflux disease), Hemiplegia (CMS/[facility]), History of transfusion, Immunocompromised (CMS/[facility]), Intra-abdominal abscess (CMS/[facility]), Mitrofanoff appendicovesicostomy present (CMS/[facility]), MSSA (methicillin susceptible Staphylococcus aureus), Neurogenic bladder, Neurogenic bowel, Other conditions influencing health status (07/21/2013), Other conditions influencing health status (03/04/2022), Pectus excavatum (07/21/2013), Peritoneal abscess (CMS/[facility]) (05/24/2021), Personal history of diseases of the skin and subcutaneous tissue (05/17/2019), Personal history of diseases of the skin and subcutaneous tissue, Personal history of other diseases of the digestive system (04/13/2020), Personal history of other infectious and parasitic diseases (07/24/2019), Personal history of other infectious and parasitic diseases (09/24/2018), Recurrent UTI (urinary tract infection), Restrictive lung disease, Scoliosis, unspecified, Snoring, Tinea pedis (07/23/2019), UTI (urinary tract infection), and Xerosis cutis (07/23/2019). He was referred to the [facility] for evaluation of reactive airway disease    Problem List and Orders      Assessment and Plan / Recommendations:  Problem List Items Addressed This Visit    None        Reactive airway disease  - albuterol hfa 2 puffs or albuterol nebs every 4-6 hours as needed  - cont symbicort as needed  - has acapella device he uses when he is sick  - bipap at night when he is sick  Will get pulmonary function test and FENO and prior to next visit   Allergy medications prescribed/continued today include: nasal spray as needed, Zyrtec/cetirizine as need, Singulair/montelukast daily         Thank  you for visiting the Pulmonary clinic today!       Return to clinic after ___6 months and after PFTs or sooner if needed   Griselda Shea CNP  My office number is [phone] -     Any test results will be discussed at next visit -- please make sure to make a follow up appt after testing.

## 2024-02-01 ENCOUNTER — OFFICE VISIT (OUTPATIENT)
Dept: PULMONOLOGY | Facility: CLINIC | Age: 24
End: 2024-02-01
Payer: COMMERCIAL

## 2024-02-01 VITALS
RESPIRATION RATE: 16 BRPM | BODY MASS INDEX: 28.88 KG/M2 | TEMPERATURE: 97.7 F | OXYGEN SATURATION: 96 % | HEIGHT: 69 IN | SYSTOLIC BLOOD PRESSURE: 104 MMHG | HEART RATE: 79 BPM | DIASTOLIC BLOOD PRESSURE: 62 MMHG | WEIGHT: 195 LBS

## 2024-02-01 DIAGNOSIS — G70.9 NEUROMUSCULAR WEAKNESS (MULTI): ICD-10-CM

## 2024-02-01 DIAGNOSIS — J98.4 RESTRICTIVE LUNG DISEASE: ICD-10-CM

## 2024-02-01 DIAGNOSIS — G82.50 QUADRIPLEGIA AND QUADRIPARESIS (MULTI): ICD-10-CM

## 2024-02-01 DIAGNOSIS — J45.20 MILD INTERMITTENT REACTIVE AIRWAY DISEASE WITHOUT COMPLICATION (HHS-HCC): Primary | ICD-10-CM

## 2024-02-01 PROCEDURE — 99204 OFFICE O/P NEW MOD 45 MIN: CPT | Performed by: NURSE PRACTITIONER

## 2024-02-01 PROCEDURE — 1036F TOBACCO NON-USER: CPT | Performed by: NURSE PRACTITIONER

## 2024-02-01 RX ORDER — MONTELUKAST SODIUM 10 MG/1
10 TABLET ORAL DAILY
Qty: 30 TABLET | Refills: 11 | Status: SHIPPED | OUTPATIENT
Start: 2024-02-01 | End: 2024-06-06

## 2024-02-01 RX ORDER — ALBUTEROL SULFATE 0.83 MG/ML
2.5 SOLUTION RESPIRATORY (INHALATION) EVERY 4 HOURS PRN
Qty: 75 ML | Refills: 3 | Status: SHIPPED | OUTPATIENT
Start: 2024-02-01 | End: 2024-04-12

## 2024-02-01 ASSESSMENT — PATIENT HEALTH QUESTIONNAIRE - PHQ9
2. FEELING DOWN, DEPRESSED OR HOPELESS: NOT AT ALL
1. LITTLE INTEREST OR PLEASURE IN DOING THINGS: NOT AT ALL
SUM OF ALL RESPONSES TO PHQ9 QUESTIONS 1 AND 2: 0

## 2024-02-01 NOTE — PATIENT INSTRUCTIONS
Reactive airway disease  - albuterol hfa 2 puffs or albuterol nebs every 4-6 hours as needed  - cont symbicort as needed  - has acapella device he uses when he is sick  - bipap at night when he is sick  Will get pulmonary function test and FENO and prior to next visit         Thank you for visiting the Pulmonary clinic today!       Return to clinic after ___6 months and after PFTs or sooner if needed   Griselda Shea CNP  My office number is [phone] -     Any test results will be discussed at next visit -- please make sure to make a follow up appt after testing.

## 2024-02-21 ENCOUNTER — HOSPITAL ENCOUNTER (OUTPATIENT)
Facility: HOSPITAL | Age: 24
Setting detail: OUTPATIENT SURGERY
End: 2024-02-21
Attending: STUDENT IN AN ORGANIZED HEALTH CARE EDUCATION/TRAINING PROGRAM | Admitting: STUDENT IN AN ORGANIZED HEALTH CARE EDUCATION/TRAINING PROGRAM
Payer: COMMERCIAL

## 2024-02-21 ENCOUNTER — OFFICE VISIT (OUTPATIENT)
Dept: UROLOGY | Facility: CLINIC | Age: 24
End: 2024-02-21
Payer: COMMERCIAL

## 2024-02-21 VITALS
WEIGHT: 195 LBS | BODY MASS INDEX: 28.8 KG/M2 | TEMPERATURE: 97.7 F | HEART RATE: 69 BPM | DIASTOLIC BLOOD PRESSURE: 71 MMHG | SYSTOLIC BLOOD PRESSURE: 111 MMHG

## 2024-02-21 DIAGNOSIS — N30.00 ACUTE CYSTITIS WITHOUT HEMATURIA: ICD-10-CM

## 2024-02-21 DIAGNOSIS — R82.90 ABNORMAL URINALYSIS: ICD-10-CM

## 2024-02-21 DIAGNOSIS — N31.9 NEUROGENIC BLADDER: Primary | ICD-10-CM

## 2024-02-21 LAB
POC APPEARANCE, URINE: CLEAR
POC BILIRUBIN, URINE: NEGATIVE
POC BLOOD, URINE: ABNORMAL
POC COLOR, URINE: YELLOW
POC GLUCOSE, URINE: NEGATIVE MG/DL
POC KETONES, URINE: NEGATIVE MG/DL
POC LEUKOCYTES, URINE: ABNORMAL
POC NITRITE,URINE: NEGATIVE
POC PH, URINE: 7 PH
POC PROTEIN, URINE: NEGATIVE MG/DL
POC SPECIFIC GRAVITY, URINE: 1.02
POC UROBILINOGEN, URINE: 0.2 EU/DL

## 2024-02-21 PROCEDURE — 1036F TOBACCO NON-USER: CPT | Performed by: STUDENT IN AN ORGANIZED HEALTH CARE EDUCATION/TRAINING PROGRAM

## 2024-02-21 PROCEDURE — 81003 URINALYSIS AUTO W/O SCOPE: CPT | Performed by: STUDENT IN AN ORGANIZED HEALTH CARE EDUCATION/TRAINING PROGRAM

## 2024-02-21 PROCEDURE — 99214 OFFICE O/P EST MOD 30 MIN: CPT | Performed by: STUDENT IN AN ORGANIZED HEALTH CARE EDUCATION/TRAINING PROGRAM

## 2024-02-21 PROCEDURE — 87086 URINE CULTURE/COLONY COUNT: CPT

## 2024-02-21 RX ORDER — CEPHALEXIN 500 MG/1
500 CAPSULE ORAL 4 TIMES DAILY
Qty: 28 CAPSULE | Refills: 0 | Status: SHIPPED | OUTPATIENT
Start: 2024-02-21 | End: 2024-02-28

## 2024-02-21 RX ORDER — SODIUM CHLORIDE 9 MG/ML
100 INJECTION, SOLUTION INTRAVENOUS CONTINUOUS
Status: CANCELLED | OUTPATIENT
Start: 2024-02-21

## 2024-02-21 NOTE — PROGRESS NOTES
Jose presents for a follow up visit.  The patient’s EMR has been reviewed.  Lives in [place], [place]  Accompanied by Mother whom provides additional history.  Previously followed by Pediatric Urology with Dr. Workman.     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  Now with myelomalacia, hemiparesis, neurogenic bowel and bladder.  Hx of recurrent UTIs s/p mitrofanoff with Dr. Myles, now experiencing recurrent intra-abdominal abscess.     Recently hospitalized for a week requiring PICU admission for an intraabdominal infection. He was evaluated by Dr. Workman while inpatient, had CT imaging showing potential perforation of his bladder. They wonder if this was 2/2 ETOH use and bladder overfilling faster than normal right before admission. This has been chronic problem, however cystogram imaging has been inconclusive as there is a possibility of having a bladder diverticulum vs micro-perforations.      UDS reviewed, bladder capacity appears in the 300-400ml range which should be well managed with his current CIC regimen and oral medications. Numerous CT and MRI images reviewed going back to 2021, possible diverticula vs. thin area seen in the bladder dome, small right posterior diverticulum, nothing significant, most recent CT without clear/definitive perforation obvious.    Now, s/p cysto, bladder botox and exp lap with me (10/20/23)  Ex-lap unsuccessful due to intraabdominal adhesions  Cystoscopy, cystogram, and looposcopy/loopogram did not reveal any perforation. There were well-healed areas of injury in the posterior wall of the bladder near the Mitrofanoff entry point that I feel are likely due to injuries with catheterization.    SUBJECTIVE: HPI   TODAY (02/21/24)  Reports he has been doing well overall.   Reports significant improvement since starting bladder botox.   However, continues to have recurrent UTI's.   Albeit, he has only been symptomatic for 1-2 days before resolving.    Continues daily Macrobid for prophylaxis.   Denies any gross hematuria, fevers or chills.     TO REVIEW: Last visit (11/15/23)  Accompanied by Mother whom provides additional history.  Concerned that he may have a UTI given his malodorous urine.  Reports having about 3x UTI's in the past 6 months.   Not currently on antibiotics for prophylaxis.     S/p courses of keflex, amikacin and gentamicin washes for prophylaxis.   Tolerated levofloxacin with benadryl in the past.   Has tolerated macrobid in the past as well.   Continues CIC, no issues.   Feel botox is working  Cathing for more volumes and less of an interval, less leakage in pull-up     Notes he appears to be more constipated since the procedure.   Gradually improving post-operatively.     TO REVIEW: (09/06/23)  Doing well s/p latest hospitalization for an intraabdominal infection.  Continues flagyl and ceftriaxone for treatment. Followed by ID.  ID is concerned given his ESR remains elevated.  Plans to undergo further imaging.  Denies any recent infections.     Continues CIC via mitrafanoff, without significant issue.  C/o persistent OAB symptoms.   No significant benefit with myrbetriq.  Denies any side-effects.     TO REVIEW: (08/23/23)  Myrbetriq helping, denies any side-effects.  Doing well s/p latest hospitalization, still has indwelling urethral catheter  Evaluated by ID yesterday, continues flagyl and ceftriaxone for treatment.  Continues to cath mitrofanoff once daily to promote patency without issue  Denies any gross hematuria, flank pain, recent fevers or chills.      TO REVIEW: Initial evaluation (07/27/23)  Recently treated for sepsis 2/2 a MSSA R gluteal abscess. (March 2023)  Followed by Pediatric surgery (Dr. Resendiz) for this, s/p vessel loop placement (04/17/23).    Underwent a post-op MRI (05/23/23) 2/2 concerns of infectious recurrence.  MRI was negative for osteomyelitis but did show residual fluid in the abdomen.   Described as  phlegmon. Currently he is symptom free and completed antibiotics.     Continues to follow up with pediatric ID, with Dr. Lyric Vargas.   Initially, he was performing gentamicin washes s/p david.   Transitioned to amikacin bladder washes, however discontinued 2/2 adverse reactions. Discontinued this about 1-2 months ago.     Continues to cath his stoma throughout the day.  Mother caths via his penis once before bed and in the morning to fully drain his bladder.   Denies having to irrigate or any significant mucus with this.  Occasionally, she will irrigate if he has a UTI.  He denies any recent UTIs.  Constipation has been under control.      At baseline, he leaks occasionally.   Albeit, he states this has been much improved since taking max dose oxybutynin.  His OAB symptoms and leakage are not too bothersome at this time.  He does not feel he requires additional treatment for this right now.  But he may reconsider this if his symptoms worsen.      PSHx: Multiple gastric surgeries (3x for closure of gastrocutaneous fistula, feeding gastrostomy placement). Hx of baclofen pump.   Allergies: Bactrim (anaphylaxis), vancomycin (rash), ciprofloxacin (urticaria    Past medical, surgical, family and social history in the chart was reviewed and is accurate including any additions to what is in this HPI.    Review of Systems   Constitutional: denies any unintentional weight loss or change in strength.  Integumentary: denies any rashes or pruritus.  Eyes: denies any double vision or eye pain.  Ear/Nose/Mouth/Throat: denies any nosebleeds or gum bleeds.  Cardiovascular: denies any chest pain or syncope.  Respiratory: denies hemoptysis.  Gastrointestinal: denies nausea or vomiting.  Musculoskeletal: denies muscle cramping or weakness.  Neurologic: denies convulsions or seizures.  Hematologic/Lymphatic: denies bleeding tendencies.  Endocrine: denies heat/cold intolerance.  All other systems have been reviewed and are  negative unless otherwise noted in the HPI.    OBJECTIVE:  Visit Vitals  /71   Pulse 69   Temp 36.5 °C (97.7 °F)     Physical Exam   Constitutional: No obvious distress.  Eyes: Non-injected conjunctiva, sclera clear, EOMI.  Ears/Nose/Mouth/Throat: No obvious drainage per ears or nose.  Cardiovascular: Extremities are warm and well perfused. No edema, cyanosis or pallor.  Respiratory: No audible wheezing/stridor; respirations do not appear labored.  Gastrointestinal: Abdomen soft, not distended.  Musculoskeletal: Normal ROM of extremities.  Skin: No obvious rashes or open sores.  Neurologic: Alert and oriented, CN 2-12 grossly intact.  Psychiatric: Answers questions appropriately with normal affect.  Hematologic/Lymphatic/Immunologic: No obvious bruises or sites of spontaneous bleeding.  Genitourinary: No CVA tenderness, bladder not palpable.     Labs and imaging:  Lab Results   Component Value Date    WBC 7.6 01/23/2024    HGB 15.4 01/23/2024    HCT 44.2 01/23/2024     01/23/2024    CHOL 155 03/04/2022    TRIG 121 03/04/2022    HDL 36.0 (A) 03/04/2022    ALT 14 08/22/2023    AST 17 08/22/2023     10/12/2023    K 4.0 10/12/2023     10/12/2023    CREATININE 0.50 10/12/2023    BUN 8 10/12/2023    CO2 26 10/12/2023    TSH 1.10 03/04/2022    INR 1.3 (H) 08/11/2023     ASSESSMENT:  Problem List Items Addressed This Visit    None     PLAN:  Send urine for culture, treat based on C + S.   He reports significant improvement of his urinary symptoms with bladder botox.   Denies any significant side-effects and wishes to continue. At present symptom control persists.   Elects to repeat in the OR, risks discussed.   Tentative OR date of April 23rd, 2024 for repeat bladder botox 200U   In the meantime, continue daily Macrobid for UTI prophylaxis.     TO REVIEW:  Treat empirically with course of Augmentin for presumed UTI.  Followed by 50 mg daily macrodantin for UTI prophylaxis.   Will continue ppx course  for 6 months.   Encouraged to call if he develops any UTI or acute//worsening symptoms.   Encouraged to call if he develops issues with cathing, retention or leakage.   RTC in 3 months for reassessment, can consider 300u botox at next procedure when this is indicated    All questions were answered to the patient’s satisfaction.  Patient agrees with the plan and wishes to proceed.  Follow-up will be scheduled appropriately.     Scribed for Dr. Adam Vargas by [employer].  I, Dr. Adam Vargas have personally reviewed and agreed with the information entered by the Virtual Scribe. 02/21/24.

## 2024-02-22 LAB — BACTERIA UR CULT: NO GROWTH

## 2024-02-23 ENCOUNTER — APPOINTMENT (OUTPATIENT)
Dept: PRIMARY CARE | Facility: CLINIC | Age: 24
End: 2024-02-23
Payer: COMMERCIAL

## 2024-02-29 ENCOUNTER — OFFICE VISIT (OUTPATIENT)
Dept: WOUND CARE | Facility: CLINIC | Age: 24
End: 2024-02-29
Payer: COMMERCIAL

## 2024-02-29 PROCEDURE — 99213 OFFICE O/P EST LOW 20 MIN: CPT

## 2024-03-07 ENCOUNTER — OFFICE VISIT (OUTPATIENT)
Dept: WOUND CARE | Facility: CLINIC | Age: 24
End: 2024-03-07
Payer: COMMERCIAL

## 2024-03-07 PROCEDURE — 99212 OFFICE O/P EST SF 10 MIN: CPT

## 2024-03-14 ENCOUNTER — OFFICE VISIT (OUTPATIENT)
Dept: WOUND CARE | Facility: CLINIC | Age: 24
End: 2024-03-14
Payer: COMMERCIAL

## 2024-03-14 PROCEDURE — 99211 OFF/OP EST MAY X REQ PHY/QHP: CPT

## 2024-03-26 DIAGNOSIS — N31.9 NEUROGENIC BLADDER: ICD-10-CM

## 2024-03-27 RX ORDER — OXYBUTYNIN CHLORIDE 15 MG/1
15 TABLET, EXTENDED RELEASE ORAL DAILY
Qty: 30 TABLET | Refills: 2 | Status: SHIPPED | OUTPATIENT
Start: 2024-03-27

## 2024-04-08 ENCOUNTER — APPOINTMENT (OUTPATIENT)
Dept: PREADMISSION TESTING | Facility: HOSPITAL | Age: 24
End: 2024-04-08
Payer: COMMERCIAL

## 2024-04-09 ENCOUNTER — APPOINTMENT (OUTPATIENT)
Dept: PREADMISSION TESTING | Facility: HOSPITAL | Age: 24
End: 2024-04-09
Payer: COMMERCIAL

## 2024-04-12 ENCOUNTER — TELEMEDICINE CLINICAL SUPPORT (OUTPATIENT)
Dept: PREADMISSION TESTING | Facility: HOSPITAL | Age: 24
End: 2024-04-12
Payer: COMMERCIAL

## 2024-04-12 RX ORDER — MOMETASONE FUROATE 220 UG/1
2 INHALANT RESPIRATORY (INHALATION) DAILY PRN
COMMUNITY

## 2024-04-12 NOTE — PREPROCEDURE INSTRUCTIONS
Medication List            Accurate as of April 12, 2024  3:14 PM. Always use your most recent med list.                albuterol 2.5 mg /3 mL (0.083 %) nebulizer solution  Take 3 mL (2.5 mg) by nebulization every 4 hours if needed for wheezing. Inhale one vial every 4-6 hours as needed for cough, wheezing and shortness of breath  Take as directed   Asmanex Twisthaler 220 mcg/ actuation (14) inhaler  Generic drug: mometasone  Take as directed   budesonide-formoteroL 160-4.5 mcg/actuation inhaler  Commonly known as: Symbicort  Take as directed   cholecalciferol 50 mcg (2,000 unit) capsule  Commonly known as: Vitamin D-3  Hold 7 days before surgery   cyanocobalamin 1,000 mcg tablet  Commonly known as: Vitamin B-12  Hold 7 days before surgery   docusate sodium 100 mg capsule  Commonly known as: Colace  Take 1 capsule (100 mg) by mouth once daily.  Hold morning of surgery   famotidine 40 mg tablet  Commonly known as: Pepcid  Take 1 tablet (40 mg) by mouth 2 times a day.  Take with sip of water   L. ACIDOPHILUS/BIFID. ANIMALIS ORAL  Hold 7 days before surgery   montelukast 10 mg tablet  Commonly known as: Singulair  Take 1 tablet (10 mg) by mouth once daily.  Take   Myrbetriq 50 mg tablet extended release 24 hr 24 hr tablet  Generic drug: mirabegron  take 1 tablet by mouth once daily  Hold day of surgery   nitrofurantoin 50 mg capsule  Commonly known as: Macrodantin  Take 1 capsule (50 mg) by mouth once daily at bedtime. Do not start before November 22, 2023.  Take as directed   oxybutynin XL 15 mg 24 hr tablet  Commonly known as: Ditropan-XL  Take 1 tablet (15 mg) by mouth once daily.  Hold day of surgery   polyethylene glycol 17 gram/dose powder  Commonly known as: Glycolax, Miralax  Take 17 g by mouth once daily. MIX IN 8 OUNCES OF WATER, JUICE, OR TEA AND DRINK DAILY.  Hold day of surgery   Slow Fe 142 mg (45 mg iron) ER tablet  Generic drug: ferrous sulfate ER  Hold day of surgery                            NPO  Instructions:    Do not eat any food after midnight the night before your surgery/procedure.    Additional Instructions:     Seven/Six Days before Surgery:  Review your medication instructions, stop indicated medications  Five Days before Surgery:  Review your medication instructions, stop indicated medications  Three Days before Surgery:  Review your medication instructions, stop indicated medications  The Day before Surgery:  Review your medication instructions, stop indicated medications  You will be contacted regarding the time of your arrival to facility and surgery time  Do not eat any food after Midnight  Day of Surgery:  Review your medication instructions, take indicated medications  Wear  comfortable loose fitting clothing  Do not use moisturizers, creams, lotions or perfume  All jewelry and valuables should be left at home  PAT PRE-OPERATIVE INSTRUCTIONS    [facility]  [address] [facility] [place] [address]  [phone]    Please let your surgeon know if:      You develop:  Open sores, shingles, burning or painful urination as these may increase your risk of an infection.   Fever=100.4 or greater   New or worsening cold or flu symptoms ( cough, shortness of breath, sore throat, respiratory distress, headache, fatigue, GI symptoms)   You no longer wish to have the surgery.   Any other personal circumstances change that may lead to the need to cancel or defer this surgery-such as being sick or getting admitted to any hospital within one week of your planned procedure.    Your contact details change, such as a change of address or phone number.    Starting now:     Please DO NOT drink alcohol or smoke for 24 hours before surgery. It is well known that quitting smoking can make a huge difference to your health and recovery from surgery. The longer you abstain from smoking, the better your chances of a healthy recovery. If you need help with quitting, call 3-984-QUIT-NOW  to be connected to a trained counselor who will discuss the best methods to help you quit.     Before your surgery:    Please stop all supplements/ vitamins 7 days prior to surgery (or as directed by your surgeon).   Please stop taking NSAID pain medicine such as Advil, Ibuprofen, and Motrin 7 days before surgery.    If you develop any fever, cough, cold, rashes, cuts, scratches, scrapes, urinary symptoms or infection anywhere on your body (including teeth and gums) prior to surgery, please call your surgeon’s office as soon as possible. This may require treatment to reduce the chance of cancellation on the day of surgery.    The day before your surgery:   DIET- Do not eat any food after MIDNIGHT.   Get a good night’s rest.  Use the special soap for bathing if you have been instructed to use one.    Scheduled surgery times may change and you will be notified if this occurs - please check your personal voicemail for any updates.     On the morning of surgery:   Wear comfortable, loose fitting clothes which open in the front.   Shower and please do not wear moisturizers, creams, lotions, deodorants, makeup or perfume.    Please bring with you to surgery:   Photo ID and insurance card   Current list of medicines and allergies   Pacemaker/ Defibrillator/Heart stent cards as well as remote controls for implanted devices    CPAP machine and mask    Slings/ splints/ crutches   A copy of your complete advanced directive/DHPOA.    Please do NOT bring with you to surgery:   All jewelry and valuables should be left at home.   Prosthetic devices such as contact lenses, glasses, hearing aids, dentures, eyelash extensions, hairpins and body piercings must be removed prior to going in to the surgical suite. If you have a case for these items, please bring it with you on surgery day.    Patients under the age of 18: A responsible adult must be present and remaining in the building throughout the surgical visit.    After outpatient  surgery:   A responsible adult MUST accompany you at the time of discharge and stay with you for 24 hours after your surgery. You may NOT drive yourself home after surgery.    Do not drive, operate machinery, make critical decisions or do activities that require co-ordination or balance until after a night’s sleep.   Do not drink alcoholic beverages for 24 hours.   Instructions for resuming your medications will be provided by your surgeon.    CALL YOUR DOCTOR AFTER SURGERY IF YOU HAVE:     Chills and/or a fever of 101° F or higher.    Redness, swelling, pus or drainage from your surgical wound or a bad smell from the wound.    Lightheadedness, fainting or confusion.    Persistent vomiting (throwing up) and are not able to eat or drink for 12 hours.    Three or more loose, watery bowel movements in 24 hours (diarrhea).   Difficulty or pain while urinating( after non-urological surgery)    Pain and swelling in your legs, especially if it is only on one side.    Difficulty breathing or are breathing faster than normal.    Any new concerning symptoms.      Reviewed pre-op instructions with patient, states understanding and denies further questions at this time.      Take Care

## 2024-04-18 ENCOUNTER — APPOINTMENT (OUTPATIENT)
Dept: PRIMARY CARE | Facility: CLINIC | Age: 24
End: 2024-04-18
Payer: COMMERCIAL

## 2024-04-30 ENCOUNTER — APPOINTMENT (OUTPATIENT)
Dept: PREADMISSION TESTING | Facility: HOSPITAL | Age: 24
End: 2024-04-30
Payer: COMMERCIAL

## 2024-05-16 ENCOUNTER — HOSPITAL ENCOUNTER (OUTPATIENT)
Dept: RESPIRATORY THERAPY | Facility: HOSPITAL | Age: 24
Discharge: HOME | End: 2024-05-16
Payer: COMMERCIAL

## 2024-05-16 DIAGNOSIS — J45.20 MILD INTERMITTENT REACTIVE AIRWAY DISEASE WITHOUT COMPLICATION (HHS-HCC): ICD-10-CM

## 2024-05-16 PROCEDURE — 94727 GAS DIL/WSHOT DETER LNG VOL: CPT | Performed by: INTERNAL MEDICINE

## 2024-05-16 PROCEDURE — 94060 EVALUATION OF WHEEZING: CPT | Performed by: INTERNAL MEDICINE

## 2024-05-16 PROCEDURE — 94729 DIFFUSING CAPACITY: CPT | Performed by: INTERNAL MEDICINE

## 2024-05-16 PROCEDURE — 94640 AIRWAY INHALATION TREATMENT: CPT | Mod: 59

## 2024-05-17 LAB
MGC ASCENT PFT - FEV1 - POST: 2.7
MGC ASCENT PFT - FEV1 - PRE: 2.32
MGC ASCENT PFT - FEV1 - PREDICTED: 4.23
MGC ASCENT PFT - FVC - POST: 3.1
MGC ASCENT PFT - FVC - PRE: 3.07
MGC ASCENT PFT - FVC - PREDICTED: 4.91

## 2024-05-22 ENCOUNTER — APPOINTMENT (OUTPATIENT)
Dept: UROLOGY | Facility: CLINIC | Age: 24
End: 2024-05-22
Payer: COMMERCIAL

## 2024-05-22 ENCOUNTER — OFFICE VISIT (OUTPATIENT)
Dept: PRIMARY CARE | Facility: CLINIC | Age: 24
End: 2024-05-22
Payer: COMMERCIAL

## 2024-05-22 VITALS
WEIGHT: 195 LBS | HEIGHT: 69 IN | SYSTOLIC BLOOD PRESSURE: 118 MMHG | OXYGEN SATURATION: 97 % | RESPIRATION RATE: 16 BRPM | BODY MASS INDEX: 28.88 KG/M2 | HEART RATE: 70 BPM | DIASTOLIC BLOOD PRESSURE: 68 MMHG | TEMPERATURE: 98 F

## 2024-05-22 DIAGNOSIS — Z93.1 S/P NISSEN FUNDOPLICATION (WITH GASTROSTOMY TUBE PLACEMENT) (MULTI): ICD-10-CM

## 2024-05-22 DIAGNOSIS — J98.4 RESTRICTIVE LUNG DISEASE: ICD-10-CM

## 2024-05-22 DIAGNOSIS — G82.50 QUADRIPLEGIA AND QUADRIPARESIS (MULTI): ICD-10-CM

## 2024-05-22 DIAGNOSIS — Z11.4 ENCOUNTER FOR SCREENING FOR HIV: ICD-10-CM

## 2024-05-22 DIAGNOSIS — N31.9 NEUROGENIC BLADDER: ICD-10-CM

## 2024-05-22 DIAGNOSIS — I61.9: ICD-10-CM

## 2024-05-22 DIAGNOSIS — G82.54: ICD-10-CM

## 2024-05-22 DIAGNOSIS — J45.30 MILD PERSISTENT ASTHMA WITHOUT COMPLICATION (HHS-HCC): ICD-10-CM

## 2024-05-22 DIAGNOSIS — Z23 IMMUNIZATION DUE: ICD-10-CM

## 2024-05-22 DIAGNOSIS — Z00.00 WELL ADULT HEALTH CHECK: Primary | ICD-10-CM

## 2024-05-22 DIAGNOSIS — R25.2 SPASTICITY: ICD-10-CM

## 2024-05-22 DIAGNOSIS — E53.8 VITAMIN B12 DEFICIENCY: ICD-10-CM

## 2024-05-22 DIAGNOSIS — N39.0 RECURRENT UTI: ICD-10-CM

## 2024-05-22 DIAGNOSIS — G80.0 SPASTIC QUADRIPLEGIC CEREBRAL PALSY (MULTI): ICD-10-CM

## 2024-05-22 DIAGNOSIS — G81.93: ICD-10-CM

## 2024-05-22 DIAGNOSIS — E53.8 VITAMIN B 12 DEFICIENCY: ICD-10-CM

## 2024-05-22 DIAGNOSIS — R70.0 ELEVATED ERYTHROCYTE SEDIMENTATION RATE: ICD-10-CM

## 2024-05-22 DIAGNOSIS — E55.9 VITAMIN D DEFICIENCY: ICD-10-CM

## 2024-05-22 DIAGNOSIS — Q65.89 HIP DYSPLASIA (HHS-HCC): ICD-10-CM

## 2024-05-22 DIAGNOSIS — N39.45 URINARY INCONTINENCE WITH CONTINUOUS LEAKAGE: ICD-10-CM

## 2024-05-22 PROBLEM — A41.9 SEPSIS (MULTI): Status: RESOLVED | Noted: 2023-04-24 | Resolved: 2024-05-22

## 2024-05-22 PROBLEM — R60.9 EDEMA: Status: RESOLVED | Noted: 2023-03-08 | Resolved: 2024-05-22

## 2024-05-22 PROBLEM — R94.2 ABNORMAL PFT: Status: RESOLVED | Noted: 2023-03-08 | Resolved: 2024-05-22

## 2024-05-22 PROBLEM — R33.9 URINARY RETENTION: Status: RESOLVED | Noted: 2023-03-08 | Resolved: 2024-05-22

## 2024-05-22 PROBLEM — A49.01 MSSA (METHICILLIN SUSCEPTIBLE STAPHYLOCOCCUS AUREUS): Status: RESOLVED | Noted: 2023-05-02 | Resolved: 2024-05-22

## 2024-05-22 PROBLEM — R05.9 COUGH: Status: RESOLVED | Noted: 2023-03-08 | Resolved: 2024-05-22

## 2024-05-22 PROBLEM — A49.01 MSSA (METHICILLIN SUSCEPTIBLE STAPHYLOCOCCUS AUREUS): Status: ACTIVE | Noted: 2023-05-02

## 2024-05-22 PROBLEM — L02.91 ABSCESS: Status: RESOLVED | Noted: 2023-04-19 | Resolved: 2024-05-22

## 2024-05-22 PROBLEM — R60.0 BILATERAL EDEMA OF LOWER EXTREMITY: Status: RESOLVED | Noted: 2023-03-08 | Resolved: 2024-05-22

## 2024-05-22 PROBLEM — B99.9 INTRA-ABDOMINAL INFECTION: Status: RESOLVED | Noted: 2023-03-08 | Resolved: 2024-05-22

## 2024-05-22 PROCEDURE — 90471 IMMUNIZATION ADMIN: CPT | Performed by: INTERNAL MEDICINE

## 2024-05-22 PROCEDURE — 90651 9VHPV VACCINE 2/3 DOSE IM: CPT | Performed by: INTERNAL MEDICINE

## 2024-05-22 PROCEDURE — 99395 PREV VISIT EST AGE 18-39: CPT | Performed by: INTERNAL MEDICINE

## 2024-05-22 ASSESSMENT — PAIN SCALES - GENERAL: PAINLEVEL: 0-NO PAIN

## 2024-05-22 NOTE — H&P (VIEW-ONLY)
"Subjective   Jose Fernandez is a 24 y.o. male who presents for Annual Exam.      Well Adult Physical   Patient here for a comprehensive physical exam.The patient reports no problems    Do you take any herbs or supplements that were not prescribed by a doctor? yes   Are you taking calcium supplements? yes   Are you taking aspirin daily? no     History:  Patient receives prostate care outside our clinic  Date last PSA: n/a    GI for supplies and uses nightly enemas to prevent incontinence.  Seeing Peds GI still every January.  Urologist left for [facility] and took NP staff with her.  Follows with Alicia and has bladder injections and for recurrent UTI  Pulmonology switched to [facility]        Review of Systems   Constitutional:  Negative for chills and fever.   HENT:  Negative for sore throat and trouble swallowing.    Respiratory:  Negative for shortness of breath.    Cardiovascular:  Negative for chest pain, palpitations and leg swelling.   Gastrointestinal:  Negative for abdominal pain.   Skin:  Negative for rash.       Objective   /68   Pulse 70   Temp 36.7 °C (98 °F)   Resp 16   Ht 1.753 m (5' 9\")   Wt 88.5 kg (195 lb)   SpO2 97%   BMI 28.80 kg/m²       Physical Exam  Constitutional:       Appearance: Normal appearance.   HENT:      Head: Normocephalic.   Eyes:      Conjunctiva/sclera: Conjunctivae normal.   Cardiovascular:      Rate and Rhythm: Normal rate and regular rhythm.      Heart sounds: Normal heart sounds.   Pulmonary:      Effort: Pulmonary effort is normal.      Breath sounds: Normal breath sounds.   Abdominal:      General: Abdomen is flat.   Musculoskeletal:      Cervical back: Neck supple.   Skin:     General: Skin is warm and dry.   Neurological:      Mental Status: He is alert.      Cranial Nerves: Cranial nerves 2-12 are intact. No cranial nerve deficit.      Sensory: Sensory deficit present.      Motor: Weakness present.      Gait: Gait abnormal.      Deep Tendon Reflexes: "      Reflex Scores:       Patellar reflexes are 3+ on the right side and 3+ on the left side.     Comments: UE Right > Left arm weakness, with hand weakness and atrophy of interosseous muscles, poor extension.  LE paraparesis.    No rigidity or clonus Hyperactive DTRs.     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)    Neurogenic bladder    Quadriplegia and quadriparesis (Multi)    Recurrent UTI    Restrictive lung disease    S/P Nissen fundoplication (with gastrostomy tube placement) (Multi)    RESOLVED: Spasticity    Vitamin B12 deficiency    Vitamin D deficiency    Relevant Orders    Vitamin D 25-Hydroxy,Total (for eval of Vitamin D levels)    Urinary incontinence with continuous leakage    Spastic quadriplegic cerebral palsy (Multi)     Other Visit Diagnoses       Well adult health check    -  Primary    Relevant Orders    Comprehensive Metabolic Panel    CBC    Lipid Panel    TSH with reflex to Free T4 if abnormal    Hepatitis C Antibody    Encounter for screening for HIV        Relevant Orders    HIV 1/2 Antigen/Antibody Screen with Reflex to Confirmation    Immunization due        Relevant Orders    HPV 9-valent vaccine (GARDASIL 9) (Completed)    Vitamin B 12 deficiency        Relevant Orders    Vitamin B12    Elevated erythrocyte sedimentation rate        Relevant Orders    C-reactive protein    Sedimentation Rate          Encounter Diagnoses   Name Primary?    Well adult health check Yes    Encounter for screening for HIV     Immunization due     Spasticity     Mild persistent asthma without complication (HHS-HCC)     Restrictive lung disease     C5-C7 incomplete quadriplegia (Multi)     Birth trauma with spinal injury (Multi)     Hemiparesis of  right nondominant side due to nontraumatic intracerebral hemorrhage (Multi)     Recurrent UTI     Urinary incontinence with continuous leakage     Vitamin D deficiency     Vitamin B 12 deficiency     Elevated erythrocyte sedimentation rate     Vitamin B12 deficiency     S/P Nissen fundoplication (with gastrostomy tube placement) (Multi)     Neurogenic bladder     Quadriplegia and quadriparesis (Multi)     Spastic quadriplegic cerebral palsy (Multi)      Troy Chiang, DO

## 2024-05-22 NOTE — PROGRESS NOTES
"Subjective   Jose Fernandez is a 24 y.o. male who presents for Annual Exam.      Well Adult Physical   Patient here for a comprehensive physical exam.The patient reports no problems    Do you take any herbs or supplements that were not prescribed by a doctor? yes   Are you taking calcium supplements? yes   Are you taking aspirin daily? no     History:  Patient receives prostate care outside our clinic  Date last PSA: n/a    GI for supplies and uses nightly enemas to prevent incontinence.  Seeing Peds GI still every January.  Urologist left for [facility] and took NP staff with her.  Follows with Aliica and has bladder injections and for recurrent UTI  Pulmonology switched to [facility]        Review of Systems   Constitutional:  Negative for chills and fever.   HENT:  Negative for sore throat and trouble swallowing.    Respiratory:  Negative for shortness of breath.    Cardiovascular:  Negative for chest pain, palpitations and leg swelling.   Gastrointestinal:  Negative for abdominal pain.   Skin:  Negative for rash.       Objective   /68   Pulse 70   Temp 36.7 °C (98 °F)   Resp 16   Ht 1.753 m (5' 9\")   Wt 88.5 kg (195 lb)   SpO2 97%   BMI 28.80 kg/m²       Physical Exam  Constitutional:       Appearance: Normal appearance.   HENT:      Head: Normocephalic.   Eyes:      Conjunctiva/sclera: Conjunctivae normal.   Cardiovascular:      Rate and Rhythm: Normal rate and regular rhythm.      Heart sounds: Normal heart sounds.   Pulmonary:      Effort: Pulmonary effort is normal.      Breath sounds: Normal breath sounds.   Abdominal:      General: Abdomen is flat.   Musculoskeletal:      Cervical back: Neck supple.   Skin:     General: Skin is warm and dry.   Neurological:      Mental Status: He is alert.      Cranial Nerves: Cranial nerves 2-12 are intact. No cranial nerve deficit.      Sensory: Sensory deficit present.      Motor: Weakness present.      Gait: Gait abnormal.      Deep Tendon Reflexes: "      Reflex Scores:       Patellar reflexes are 3+ on the right side and 3+ on the left side.     Comments: UE Right > Left arm weakness, with hand weakness and atrophy of interosseous muscles, poor extension.  LE paraparesis.    No rigidity or clonus Hyperactive DTRs.     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)    Neurogenic bladder    Quadriplegia and quadriparesis (Multi)    Recurrent UTI    Restrictive lung disease    S/P Nissen fundoplication (with gastrostomy tube placement) (Multi)    RESOLVED: Spasticity    Vitamin B12 deficiency    Vitamin D deficiency    Relevant Orders    Vitamin D 25-Hydroxy,Total (for eval of Vitamin D levels)    Urinary incontinence with continuous leakage    Spastic quadriplegic cerebral palsy (Multi)     Other Visit Diagnoses       Well adult health check    -  Primary    Relevant Orders    Comprehensive Metabolic Panel    CBC    Lipid Panel    TSH with reflex to Free T4 if abnormal    Hepatitis C Antibody    Encounter for screening for HIV        Relevant Orders    HIV 1/2 Antigen/Antibody Screen with Reflex to Confirmation    Immunization due        Relevant Orders    HPV 9-valent vaccine (GARDASIL 9) (Completed)    Vitamin B 12 deficiency        Relevant Orders    Vitamin B12    Elevated erythrocyte sedimentation rate        Relevant Orders    C-reactive protein    Sedimentation Rate          Encounter Diagnoses   Name Primary?    Well adult health check Yes    Encounter for screening for HIV     Immunization due     Spasticity     Mild persistent asthma without complication (HHS-HCC)     Restrictive lung disease     C5-C7 incomplete quadriplegia (Multi)     Birth trauma with spinal injury (Multi)     Hemiparesis of  right nondominant side due to nontraumatic intracerebral hemorrhage (Multi)     Recurrent UTI     Urinary incontinence with continuous leakage     Vitamin D deficiency     Vitamin B 12 deficiency     Elevated erythrocyte sedimentation rate     Vitamin B12 deficiency     S/P Nissen fundoplication (with gastrostomy tube placement) (Multi)     Neurogenic bladder     Quadriplegia and quadriparesis (Multi)     Spastic quadriplegic cerebral palsy (Multi)      Troy Chiang, DO

## 2024-05-27 PROBLEM — R25.2 SPASTICITY: Status: RESOLVED | Noted: 2023-03-08 | Resolved: 2024-05-27

## 2024-05-27 ASSESSMENT — ENCOUNTER SYMPTOMS
CHILLS: 0
FEVER: 0
SORE THROAT: 0
SHORTNESS OF BREATH: 0
ABDOMINAL PAIN: 0
PALPITATIONS: 0
TROUBLE SWALLOWING: 0

## 2024-05-29 ENCOUNTER — APPOINTMENT (OUTPATIENT)
Dept: UROLOGY | Facility: CLINIC | Age: 24
End: 2024-05-29
Payer: COMMERCIAL

## 2024-06-03 ENCOUNTER — LAB (OUTPATIENT)
Dept: LAB | Facility: LAB | Age: 24
End: 2024-06-03
Payer: COMMERCIAL

## 2024-06-03 DIAGNOSIS — E53.8 VITAMIN B 12 DEFICIENCY: ICD-10-CM

## 2024-06-03 DIAGNOSIS — N31.9 NEUROGENIC BLADDER: ICD-10-CM

## 2024-06-03 DIAGNOSIS — E55.9 VITAMIN D DEFICIENCY: ICD-10-CM

## 2024-06-03 DIAGNOSIS — R70.0 ELEVATED ERYTHROCYTE SEDIMENTATION RATE: ICD-10-CM

## 2024-06-03 DIAGNOSIS — Z00.00 WELL ADULT HEALTH CHECK: ICD-10-CM

## 2024-06-03 DIAGNOSIS — Z11.4 ENCOUNTER FOR SCREENING FOR HIV: ICD-10-CM

## 2024-06-03 LAB
25(OH)D3 SERPL-MCNC: 29 NG/ML (ref 30–100)
ALBUMIN SERPL BCP-MCNC: 4.2 G/DL (ref 3.4–5)
ALP SERPL-CCNC: 98 U/L (ref 33–120)
ALT SERPL W P-5'-P-CCNC: 24 U/L (ref 10–52)
ANION GAP SERPL CALC-SCNC: 12 MMOL/L (ref 10–20)
AST SERPL W P-5'-P-CCNC: 22 U/L (ref 9–39)
BILIRUB SERPL-MCNC: 0.9 MG/DL (ref 0–1.2)
BUN SERPL-MCNC: 11 MG/DL (ref 6–23)
CALCIUM SERPL-MCNC: 9.2 MG/DL (ref 8.6–10.3)
CHLORIDE SERPL-SCNC: 101 MMOL/L (ref 98–107)
CHOLEST SERPL-MCNC: 192 MG/DL (ref 0–199)
CHOLESTEROL/HDL RATIO: 4.8
CO2 SERPL-SCNC: 27 MMOL/L (ref 21–32)
CREAT SERPL-MCNC: 0.57 MG/DL (ref 0.5–1.3)
CRP SERPL-MCNC: 0.95 MG/DL
EGFRCR SERPLBLD CKD-EPI 2021: >90 ML/MIN/1.73M*2
ERYTHROCYTE [DISTWIDTH] IN BLOOD BY AUTOMATED COUNT: 12.4 % (ref 11.5–14.5)
ERYTHROCYTE [SEDIMENTATION RATE] IN BLOOD BY WESTERGREN METHOD: 13 MM/H (ref 0–15)
GLUCOSE SERPL-MCNC: 79 MG/DL (ref 74–99)
HCT VFR BLD AUTO: 43.7 % (ref 41–52)
HCV AB SER QL: NONREACTIVE
HDLC SERPL-MCNC: 40 MG/DL
HGB BLD-MCNC: 14.9 G/DL (ref 13.5–17.5)
HIV 1+2 AB+HIV1 P24 AG SERPL QL IA: NONREACTIVE
LDLC SERPL CALC-MCNC: 111 MG/DL
MCH RBC QN AUTO: 30.5 PG (ref 26–34)
MCHC RBC AUTO-ENTMCNC: 34.1 G/DL (ref 32–36)
MCV RBC AUTO: 89 FL (ref 80–100)
NON HDL CHOLESTEROL: 152 MG/DL (ref 0–149)
NRBC BLD-RTO: 0 /100 WBCS (ref 0–0)
PLATELET # BLD AUTO: 255 X10*3/UL (ref 150–450)
POTASSIUM SERPL-SCNC: 4 MMOL/L (ref 3.5–5.3)
PROT SERPL-MCNC: 6.6 G/DL (ref 6.4–8.2)
RBC # BLD AUTO: 4.89 X10*6/UL (ref 4.5–5.9)
SODIUM SERPL-SCNC: 136 MMOL/L (ref 136–145)
TRIGL SERPL-MCNC: 207 MG/DL (ref 0–149)
TSH SERPL-ACNC: 1.86 MIU/L (ref 0.44–3.98)
VIT B12 SERPL-MCNC: 192 PG/ML (ref 211–911)
VLDL: 41 MG/DL (ref 0–40)
WBC # BLD AUTO: 7.7 X10*3/UL (ref 4.4–11.3)

## 2024-06-03 PROCEDURE — 84443 ASSAY THYROID STIM HORMONE: CPT

## 2024-06-03 PROCEDURE — 80061 LIPID PANEL: CPT

## 2024-06-03 PROCEDURE — 82607 VITAMIN B-12: CPT

## 2024-06-03 PROCEDURE — 85652 RBC SED RATE AUTOMATED: CPT

## 2024-06-03 PROCEDURE — 86140 C-REACTIVE PROTEIN: CPT

## 2024-06-03 PROCEDURE — 80053 COMPREHEN METABOLIC PANEL: CPT

## 2024-06-03 PROCEDURE — 85027 COMPLETE CBC AUTOMATED: CPT

## 2024-06-03 PROCEDURE — 86803 HEPATITIS C AB TEST: CPT

## 2024-06-03 PROCEDURE — 36415 COLL VENOUS BLD VENIPUNCTURE: CPT

## 2024-06-03 PROCEDURE — 82306 VITAMIN D 25 HYDROXY: CPT

## 2024-06-03 PROCEDURE — 87389 HIV-1 AG W/HIV-1&-2 AB AG IA: CPT

## 2024-06-05 ENCOUNTER — TELEPHONE (OUTPATIENT)
Dept: SCHEDULING | Age: 24
End: 2024-06-05
Payer: COMMERCIAL

## 2024-06-05 ENCOUNTER — ANESTHESIA EVENT (OUTPATIENT)
Dept: OPERATING ROOM | Facility: HOSPITAL | Age: 24
End: 2024-06-05
Payer: COMMERCIAL

## 2024-06-05 NOTE — TELEPHONE ENCOUNTER
Patient's mom  would like to know when she needs to schedule Jose's 2nd HPV vaccine.      Please call her

## 2024-06-06 ENCOUNTER — ANESTHESIA (OUTPATIENT)
Dept: OPERATING ROOM | Facility: HOSPITAL | Age: 24
End: 2024-06-06
Payer: COMMERCIAL

## 2024-06-06 ENCOUNTER — HOSPITAL ENCOUNTER (OUTPATIENT)
Facility: HOSPITAL | Age: 24
Setting detail: OUTPATIENT SURGERY
Discharge: HOME | End: 2024-06-06
Attending: STUDENT IN AN ORGANIZED HEALTH CARE EDUCATION/TRAINING PROGRAM | Admitting: STUDENT IN AN ORGANIZED HEALTH CARE EDUCATION/TRAINING PROGRAM
Payer: COMMERCIAL

## 2024-06-06 VITALS
BODY MASS INDEX: 28.87 KG/M2 | HEIGHT: 69 IN | HEART RATE: 62 BPM | WEIGHT: 194.89 LBS | RESPIRATION RATE: 16 BRPM | TEMPERATURE: 97.7 F | OXYGEN SATURATION: 100 % | DIASTOLIC BLOOD PRESSURE: 63 MMHG | SYSTOLIC BLOOD PRESSURE: 109 MMHG

## 2024-06-06 DIAGNOSIS — N39.45 URINARY INCONTINENCE WITH CONTINUOUS LEAKAGE: Primary | ICD-10-CM

## 2024-06-06 DIAGNOSIS — K59.2 NEUROGENIC BOWEL: ICD-10-CM

## 2024-06-06 DIAGNOSIS — N31.9 NEUROGENIC BLADDER: ICD-10-CM

## 2024-06-06 PROCEDURE — 7100000002 HC RECOVERY ROOM TIME - EACH INCREMENTAL 1 MINUTE: Performed by: STUDENT IN AN ORGANIZED HEALTH CARE EDUCATION/TRAINING PROGRAM

## 2024-06-06 PROCEDURE — 87086 URINE CULTURE/COLONY COUNT: CPT | Performed by: STUDENT IN AN ORGANIZED HEALTH CARE EDUCATION/TRAINING PROGRAM

## 2024-06-06 PROCEDURE — 7100000001 HC RECOVERY ROOM TIME - INITIAL BASE CHARGE: Performed by: STUDENT IN AN ORGANIZED HEALTH CARE EDUCATION/TRAINING PROGRAM

## 2024-06-06 PROCEDURE — 3600000008 HC OR TIME - EACH INCREMENTAL 1 MINUTE - PROCEDURE LEVEL THREE: Performed by: STUDENT IN AN ORGANIZED HEALTH CARE EDUCATION/TRAINING PROGRAM

## 2024-06-06 PROCEDURE — 3700000002 HC GENERAL ANESTHESIA TIME - EACH INCREMENTAL 1 MINUTE: Performed by: STUDENT IN AN ORGANIZED HEALTH CARE EDUCATION/TRAINING PROGRAM

## 2024-06-06 PROCEDURE — 52287 CYSTOSCOPY CHEMODENERVATION: CPT | Performed by: STUDENT IN AN ORGANIZED HEALTH CARE EDUCATION/TRAINING PROGRAM

## 2024-06-06 PROCEDURE — 96372 THER/PROPH/DIAG INJ SC/IM: CPT | Performed by: STUDENT IN AN ORGANIZED HEALTH CARE EDUCATION/TRAINING PROGRAM

## 2024-06-06 PROCEDURE — 7100000010 HC PHASE TWO TIME - EACH INCREMENTAL 1 MINUTE: Performed by: STUDENT IN AN ORGANIZED HEALTH CARE EDUCATION/TRAINING PROGRAM

## 2024-06-06 PROCEDURE — 3700000001 HC GENERAL ANESTHESIA TIME - INITIAL BASE CHARGE: Performed by: STUDENT IN AN ORGANIZED HEALTH CARE EDUCATION/TRAINING PROGRAM

## 2024-06-06 PROCEDURE — 2500000004 HC RX 250 GENERAL PHARMACY W/ HCPCS (ALT 636 FOR OP/ED): Mod: SE

## 2024-06-06 PROCEDURE — A4216 STERILE WATER/SALINE, 10 ML: HCPCS | Mod: SE

## 2024-06-06 PROCEDURE — 87186 SC STD MICRODIL/AGAR DIL: CPT | Performed by: STUDENT IN AN ORGANIZED HEALTH CARE EDUCATION/TRAINING PROGRAM

## 2024-06-06 PROCEDURE — 2500000004 HC RX 250 GENERAL PHARMACY W/ HCPCS (ALT 636 FOR OP/ED): Mod: JZ,SE | Performed by: STUDENT IN AN ORGANIZED HEALTH CARE EDUCATION/TRAINING PROGRAM

## 2024-06-06 PROCEDURE — 3600000003 HC OR TIME - INITIAL BASE CHARGE - PROCEDURE LEVEL THREE: Performed by: STUDENT IN AN ORGANIZED HEALTH CARE EDUCATION/TRAINING PROGRAM

## 2024-06-06 PROCEDURE — 7100000009 HC PHASE TWO TIME - INITIAL BASE CHARGE: Performed by: STUDENT IN AN ORGANIZED HEALTH CARE EDUCATION/TRAINING PROGRAM

## 2024-06-06 PROCEDURE — 2720000007 HC OR 272 NO HCPCS: Performed by: STUDENT IN AN ORGANIZED HEALTH CARE EDUCATION/TRAINING PROGRAM

## 2024-06-06 RX ORDER — MIDAZOLAM HYDROCHLORIDE 1 MG/ML
INJECTION INTRAMUSCULAR; INTRAVENOUS AS NEEDED
Status: DISCONTINUED | OUTPATIENT
Start: 2024-06-06 | End: 2024-06-06

## 2024-06-06 RX ORDER — OXYCODONE HYDROCHLORIDE 5 MG/1
10 TABLET ORAL EVERY 4 HOURS PRN
Status: DISCONTINUED | OUTPATIENT
Start: 2024-06-06 | End: 2024-06-06 | Stop reason: HOSPADM

## 2024-06-06 RX ORDER — LIDOCAINE HYDROCHLORIDE 10 MG/ML
0.1 INJECTION, SOLUTION EPIDURAL; INFILTRATION; INTRACAUDAL; PERINEURAL ONCE
Status: DISCONTINUED | OUTPATIENT
Start: 2024-06-06 | End: 2024-06-06 | Stop reason: HOSPADM

## 2024-06-06 RX ORDER — AMOXICILLIN AND CLAVULANATE POTASSIUM 875; 125 MG/1; MG/1
1 TABLET, FILM COATED ORAL 2 TIMES DAILY
Qty: 10 TABLET | Refills: 0 | Status: SHIPPED | OUTPATIENT
Start: 2024-06-06 | End: 2024-06-11

## 2024-06-06 RX ORDER — CEFTRIAXONE 2 G/1
INJECTION, POWDER, FOR SOLUTION INTRAMUSCULAR; INTRAVENOUS
Status: COMPLETED
Start: 2024-06-06 | End: 2024-06-06

## 2024-06-06 RX ORDER — ACETAMINOPHEN 325 MG/1
650 TABLET ORAL EVERY 4 HOURS PRN
Status: DISCONTINUED | OUTPATIENT
Start: 2024-06-06 | End: 2024-06-06 | Stop reason: HOSPADM

## 2024-06-06 RX ORDER — PROPOFOL 10 MG/ML
INJECTION, EMULSION INTRAVENOUS AS NEEDED
Status: DISCONTINUED | OUTPATIENT
Start: 2024-06-06 | End: 2024-06-06

## 2024-06-06 RX ORDER — SODIUM CHLORIDE, SODIUM LACTATE, POTASSIUM CHLORIDE, CALCIUM CHLORIDE 600; 310; 30; 20 MG/100ML; MG/100ML; MG/100ML; MG/100ML
100 INJECTION, SOLUTION INTRAVENOUS CONTINUOUS
Status: DISCONTINUED | OUTPATIENT
Start: 2024-06-06 | End: 2024-06-06 | Stop reason: HOSPADM

## 2024-06-06 RX ORDER — LABETALOL HYDROCHLORIDE 5 MG/ML
5 INJECTION, SOLUTION INTRAVENOUS ONCE AS NEEDED
Status: DISCONTINUED | OUTPATIENT
Start: 2024-06-06 | End: 2024-06-06 | Stop reason: HOSPADM

## 2024-06-06 RX ORDER — FENTANYL CITRATE 50 UG/ML
INJECTION, SOLUTION INTRAMUSCULAR; INTRAVENOUS AS NEEDED
Status: DISCONTINUED | OUTPATIENT
Start: 2024-06-06 | End: 2024-06-06

## 2024-06-06 RX ORDER — HYDROMORPHONE HYDROCHLORIDE 1 MG/ML
0.2 INJECTION, SOLUTION INTRAMUSCULAR; INTRAVENOUS; SUBCUTANEOUS EVERY 5 MIN PRN
Status: DISCONTINUED | OUTPATIENT
Start: 2024-06-06 | End: 2024-06-06 | Stop reason: HOSPADM

## 2024-06-06 RX ORDER — SODIUM CHLORIDE 9 MG/ML
INJECTION, SOLUTION INTRAMUSCULAR; INTRAVENOUS; SUBCUTANEOUS
Status: COMPLETED
Start: 2024-06-06 | End: 2024-06-06

## 2024-06-06 RX ORDER — DROPERIDOL 2.5 MG/ML
0.62 INJECTION, SOLUTION INTRAMUSCULAR; INTRAVENOUS ONCE AS NEEDED
Status: DISCONTINUED | OUTPATIENT
Start: 2024-06-06 | End: 2024-06-06 | Stop reason: HOSPADM

## 2024-06-06 RX ORDER — OXYCODONE AND ACETAMINOPHEN 5; 325 MG/1; MG/1
1 TABLET ORAL EVERY 4 HOURS PRN
Status: DISCONTINUED | OUTPATIENT
Start: 2024-06-06 | End: 2024-06-06 | Stop reason: HOSPADM

## 2024-06-06 RX ORDER — CEFTRIAXONE 2 G/1
INJECTION, POWDER, FOR SOLUTION INTRAMUSCULAR; INTRAVENOUS AS NEEDED
Status: DISCONTINUED | OUTPATIENT
Start: 2024-06-06 | End: 2024-06-06

## 2024-06-06 RX ORDER — SODIUM CHLORIDE, SODIUM LACTATE, POTASSIUM CHLORIDE, CALCIUM CHLORIDE 600; 310; 30; 20 MG/100ML; MG/100ML; MG/100ML; MG/100ML
INJECTION, SOLUTION INTRAVENOUS CONTINUOUS PRN
Status: DISCONTINUED | OUTPATIENT
Start: 2024-06-06 | End: 2024-06-06

## 2024-06-06 RX ORDER — HYDROMORPHONE HYDROCHLORIDE 1 MG/ML
0.5 INJECTION, SOLUTION INTRAMUSCULAR; INTRAVENOUS; SUBCUTANEOUS EVERY 5 MIN PRN
Status: DISCONTINUED | OUTPATIENT
Start: 2024-06-06 | End: 2024-06-06 | Stop reason: HOSPADM

## 2024-06-06 RX ORDER — METOCLOPRAMIDE HYDROCHLORIDE 5 MG/ML
10 INJECTION INTRAMUSCULAR; INTRAVENOUS ONCE AS NEEDED
Status: DISCONTINUED | OUTPATIENT
Start: 2024-06-06 | End: 2024-06-06 | Stop reason: HOSPADM

## 2024-06-06 RX ADMIN — CEFTRIAXONE SODIUM 2 G: 2 INJECTION, POWDER, FOR SOLUTION INTRAMUSCULAR; INTRAVENOUS at 08:55

## 2024-06-06 RX ADMIN — PROPOFOL 50 MG: 10 INJECTION, EMULSION INTRAVENOUS at 08:55

## 2024-06-06 RX ADMIN — SODIUM CHLORIDE, POTASSIUM CHLORIDE, SODIUM LACTATE AND CALCIUM CHLORIDE: 600; 310; 30; 20 INJECTION, SOLUTION INTRAVENOUS at 08:47

## 2024-06-06 RX ADMIN — MIDAZOLAM HYDROCHLORIDE 2 MG: 1 INJECTION, SOLUTION INTRAMUSCULAR; INTRAVENOUS at 08:47

## 2024-06-06 RX ADMIN — PROPOFOL 100 MCG/KG/MIN: 10 INJECTION, EMULSION INTRAVENOUS at 08:54

## 2024-06-06 RX ADMIN — FENTANYL CITRATE 50 MCG: 50 INJECTION, SOLUTION INTRAMUSCULAR; INTRAVENOUS at 09:01

## 2024-06-06 RX ADMIN — FENTANYL CITRATE 50 MCG: 50 INJECTION, SOLUTION INTRAMUSCULAR; INTRAVENOUS at 08:57

## 2024-06-06 ASSESSMENT — PAIN SCALES - GENERAL
PAINLEVEL_OUTOF10: 0 - NO PAIN
PAIN_LEVEL: 1

## 2024-06-06 ASSESSMENT — PAIN - FUNCTIONAL ASSESSMENT: PAIN_FUNCTIONAL_ASSESSMENT: 0-10

## 2024-06-06 NOTE — OP NOTE
Cystoscopy Rigid, injection of bladder botox ( 200U ) Operative Note     Date: 2024  OR Location: [facility] OR    Name: Jose Fernandez, : 2000, Age: 24 y.o., MRN: [id number], Sex: male    Diagnosis  Pre-op Diagnosis     * Neurogenic bladder [N31.9] Post-op Diagnosis     * Neurogenic bladder [N31.9]     Procedures  Cystoscopy Rigid, injection of bladder botox ( 200U )  [id number] - ID CYSTOURETHROSCOPY    ID CYSTOURETHROSCOPY INJ CHEMODENERVATION BLADDER [[id number]]  ID INJECTION,ONABOTULINUMTOXINA []  Surgeons      * Adam Vargas - Primary    Resident/Fellow/Other Assistant:  Surgeons and Role:  * No surgeons found with a matching role *    Procedure Summary  Anesthesia: General  ASA: III  Anesthesia Staff: Anesthesiologist: Mark Bhatia MD  CRNA: IRINA Morley-CRNA  SRNA: Kimberli River  Estimated Blood Loss: 0 mL  Intra-op Medications: Administrations occurring from 0845 to 0930 on 24:  * No intraprocedure medications in log *        Specimen: No specimens collected     Staff:   Circulator: Henok Thompson Person: Troy         Drains and/or Catheters: * None in log *    Tourniquet Times:         Implants:     Findings: Bladder with trabeculations. 200 units of botox given    Indications: Jose Fernandez is an 24 y.o. male who is having surgery for Neurogenic bladder [N31.9]. 24 year old male with neurogenic bladder with detrusor overactivity who presents for botox injection, last injection was on 10/20/2023.    The patient was seen in the preoperative area. The risks, benefits, complications, treatment options, non-operative alternatives, expected recovery and outcomes were discussed with the patient. The possibilities of reaction to medication, pulmonary aspiration, injury to surrounding structures, bleeding, recurrent infection, the need for additional procedures, failure to diagnose a condition, and creating a complication requiring transfusion or operation were discussed with the  patient. The patient concurred with the proposed plan, giving informed consent.  The site of surgery was properly noted/marked if necessary per policy. The patient has been actively warmed in preoperative area. Preoperative antibiotics have been ordered and given within 1 hours of incision. Venous thrombosis prophylaxis have been ordered including bilateral sequential compression devices    Procedure Details:     The patient was identified in the perioperative area.  They were informed the risks and benefits of the procedure and informed consent was obtained. They were wheeled back to the operating room where a time-out was performed using 2 patient identifiers.  The patient was then placed in supine position and prepped and draped in the usual sterile fashion.  For antibiotic prophylaxis, the patient was given ancef.    The cystoscope was inserted into the urethra. The urethra was tortuous and edematous. The prostate was nonobstructing. A cystoscopy was performed, which demonstrated chronic changes with mild overlying bullous edema in the posterior bladder wall.  A urine sample was collected and sent off for culture.  The bladder was moderately trabeculated.  Bilateral ureteral orifices were noted in orthotopic position. We then injected a mix of 200 units of Botox in 20 mL, and we made 2 cc injections in a systematic fashion in the posterior and lateral walls of the bladder. We then performed 1 injection into the trigone to flush. The bladder was inspected and there was no significant bleeding. The bladder was emptied and all instruments were removed.     Patient will undergo their next dose in approximately 4-6 months.    Complications:  None; patient tolerated the procedure well.    Disposition: PACU - hemodynamically stable.  Condition: stable         Additional Details: He will start a 5-day course of Augmentin today.    Attending Attestation:   I was present for the entirety of the procedure(s).       Adam LORENZ  [facility]  Phone Number: [phone]

## 2024-06-06 NOTE — ANESTHESIA POSTPROCEDURE EVALUATION
Patient: Jose Fernandez    Procedure Summary       Date: 06/06/24 Room / Location: [facility] OR 04 / [facility] OR    Anesthesia Start: 0847 Anesthesia Stop: 0922    Procedure: Cystoscopy Rigid, injection of bladder botox ( 200U ) Diagnosis:       Neurogenic bladder      (Neurogenic bladder [N31.9])    Surgeons: Adam Vargas MD Responsible Provider: Mark Bhatia MD    Anesthesia Type: MAC, general ASA Status: 3            Anesthesia Type: MAC, general    Vitals Value Taken Time   /65 06/06/24 0945   Temp 36.4 °C (97.5 °F) 06/06/24 0945   Pulse 62 06/06/24 0945   Resp 14 06/06/24 0945   SpO2 100 % 06/06/24 0945       Anesthesia Post Evaluation    Patient location during evaluation: PACU  Patient participation: complete - patient participated  Level of consciousness: awake and alert  Pain score: 1  Pain management: adequate  Airway patency: patent  Cardiovascular status: acceptable  Respiratory status: acceptable  Hydration status: acceptable  Postoperative Nausea and Vomiting: none        There were no known notable events for this encounter.

## 2024-06-06 NOTE — INTERVAL H&P NOTE
H&P reviewed. The patient was examined and there are no changes to the H&P.  Okay to procede for cystoscopy and botox

## 2024-06-06 NOTE — DISCHARGE INSTRUCTIONS
DEPARTMENT OF Urology  DISCHARGE INSTRUCTIONS  Surgery    C O N F I D E N T I A L   I N F O R M A T I O N    Jose Fernandez    Call [phone] during regular daytime business hours (8:00 am - 5:00 pm) and after 5:00 pm and ask for the Urology resident with any questions or concerns.    If it is a life-threatening situation, proceed to the nearest emergency department.        Thank you for the opportunity to care for you today.  Your health and healing are very important to us.  We hope we made you feel as comfortable as possible and are committed to your recovery and continued well-being.      The following is a brief overview of your  bladder Botox procedure. Some of the information contained on this summary may be confidential.  This information should be kept in your records and should be shared with your regular doctor.    Physicians:   Dr. Adam Vargas MD    Procedure performed: Injection of Botox into the bladder through a camera    What to Expect During your Recovery and Home Care  Anesthesia Side Effects   You received anesthesia today.  You may feel sleepy, tired, or have a sore throat.   You may also feel drowsiness, dizziness, or inability to think clearly.  For your safety, do not drive, drink alcoholic beverages, take any unprescribed medication or make any important decisions for 24 hours.  A responsible adult should be with you for 24 hours.    Activity and Recovery    Rest for the next 24 hours and then resume your normal activities as tolerated.    Pain Control  Unfortunately, you may experience pain after your procedure.    Frequency and urgency to urinate and mild discomfort are expected. Adequate pain management can include alternative measures to help ease your pain and that can include over the counter acetaminophen and ibuprofen and a heating pad. Do not take more than 4,000mg of Tylenol in a 24-hour period.    You may also have been prescribed pyridium (also known as Azo) for  burning sensation. Pyridium will turn your urine bright orange.    Nausea/Vomiting   Clear liquids are best tolerated at first. Start slow, advance your diet as tolerated to normal foods. Avoid spicy, greasy, heavy foods at first. Also, you may feel nauseous or like you need to vomit if you take any type of medication on an empty stomach.  Call your physician if you are unable to eat or drink and have persistent vomiting.    Signs of Bleeding   You could have some blood in your urine off and on over the next several weeks. The urine will be light pink to yellow.    Treatment/wound care:   You do not have any new incisions or wounds.    Signs of Infection  Signs of infection can include fever, chills, burning sensation with passing of urine, or severe abdominal pain.  If you see any of these occur, please contact your doctor's office at [phone].  Any fever higher than 100.4, especially if associated with an ill feeling, abdominal pain, chills, or nausea should be reported to your surgeon.      Assist in bowel movements/urination  Increase fiber in diet  Increase water (6 to 8 glasses)  Increase walking   Urination should occur within 6 hours of anesthesia  If you have tried these methods and your bladder still feels full and you cannot use the bathroom, please go to your nearest Emergency room/contact your physician.    Additional Instructions:   Please take your antibiotics. If we need to change your antibiotics based on urine testing, we will give you a call.

## 2024-06-06 NOTE — ANESTHESIA PREPROCEDURE EVALUATION
Patient: Jose Fernandez    Procedure Information       Date/Time: 06/06/24 0845    Procedure: Cystoscopy Rigid, injection of bladder botox ( 200U )    Location: [facility] OR 04 / Virtual [facility] OR    Surgeons: Adam Vargas MD            Relevant Problems   Cardiac   (+) Arthralgia of right acromioclavicular joint      Pulmonary   (+) Asthma, mild persistent ([facility]-HCC)   (+) Restrictive lung disease      Neuro   (+) C5-C7 incomplete quadriplegia (Multi)   (+) Quadriplegia and quadriparesis (Multi)      GI   (+) Gastroesophageal reflux      /Renal   (+) Recurrent UTI      Hematology   (+) Anemia      Musculoskeletal   (+) Neuromuscular scoliosis      ID   (+) Recurrent UTI       Clinical information reviewed:                   NPO Detail:  No data recorded     Physical Exam    Airway  Mallampati: III  TM distance: >3 FB  Neck ROM: full     Cardiovascular - normal exam     Dental - normal exam     Pulmonary - normal exam     Abdominal            Anesthesia Plan    History of general anesthesia?: yes  History of complications of general anesthesia?: no    ASA 3     MAC and general     intravenous induction     Plan discussed with CRNA and attending.

## 2024-06-10 LAB — BACTERIA UR CULT: ABNORMAL

## 2024-06-12 DIAGNOSIS — N31.9 NEUROGENIC BLADDER: ICD-10-CM

## 2024-06-14 RX ORDER — OXYBUTYNIN CHLORIDE 15 MG/1
15 TABLET, EXTENDED RELEASE ORAL DAILY
Qty: 30 TABLET | Refills: 11 | Status: SHIPPED | OUTPATIENT
Start: 2024-06-14

## 2024-06-25 ENCOUNTER — APPOINTMENT (OUTPATIENT)
Dept: PRIMARY CARE | Facility: CLINIC | Age: 24
End: 2024-06-25
Payer: COMMERCIAL

## 2024-06-25 DIAGNOSIS — Z23 IMMUNIZATION DUE: ICD-10-CM

## 2024-06-25 PROCEDURE — 90651 9VHPV VACCINE 2/3 DOSE IM: CPT | Performed by: NURSE PRACTITIONER

## 2024-06-25 PROCEDURE — 90471 IMMUNIZATION ADMIN: CPT | Performed by: NURSE PRACTITIONER

## 2024-06-25 NOTE — PROGRESS NOTES
Patient is here for his second HPV vaccine  Gave vaccine in th right  deltoid, patient tolerated well

## 2024-06-27 ENCOUNTER — OFFICE VISIT (OUTPATIENT)
Dept: ORTHOPEDIC SURGERY | Facility: CLINIC | Age: 24
End: 2024-06-27
Payer: COMMERCIAL

## 2024-06-27 DIAGNOSIS — G95.11 SPINAL CORD INFARCTION (MULTI): ICD-10-CM

## 2024-06-27 DIAGNOSIS — G82.50 QUADRIPLEGIA AND QUADRIPARESIS (MULTI): Primary | ICD-10-CM

## 2024-06-27 PROCEDURE — 99213 OFFICE O/P EST LOW 20 MIN: CPT | Performed by: ORTHOPAEDIC SURGERY

## 2024-06-27 PROCEDURE — 2500000004 HC RX 250 GENERAL PHARMACY W/ HCPCS (ALT 636 FOR OP/ED): Mod: JZ | Performed by: ORTHOPAEDIC SURGERY

## 2024-06-27 PROCEDURE — 62370 ANL SP INF PMP W/MDREPRG&FIL: CPT | Performed by: ORTHOPAEDIC SURGERY

## 2024-06-27 NOTE — PATIENT INSTRUCTIONS
Today we interrogated, refilled, and reprogrammed the ITB pump. The skin overlying the pump was prepped with Betadine and draped using sterile technique. The refill port was identified with the template and cannulated using a non-coring 22 gauge needle. The residual volume of 3.7 mL was removed and discarded; the expected volume was 3.7 mL. The needle was removed and the pump was electronically reprogrammed. The new volume of 20 mL was entered and the drug concentration is 2000.  The current dose is 230.7 mcg/day.  The ISAMAR is 18 months.  The next refill must be before 11/30/24.  It needs kit # [id number].

## 2024-06-30 NOTE — PROGRESS NOTES
Jose Fernandez is a 24 y.o. 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 Pump originally implanted on 4/16/19. Since most recent visit, he has had some episodes of spasticity in his legs. This has been especially evident while trying to use the restroom since receiving botox injection into his bladder. Otherwise he is doing well, continues to work at the [employer] and has started driving.    Physical Exam:  General: Well developed, well nourished male in no acute distress.  Skin: The skin is intact with no evidence of abrasions, bruises, or swelling. I can still see the deep sutures, but there are no signs of infection.  Vascular: There are 2+ pulses in both upper extremities and brisk capillary refill.  Neuro: The light touch sensation is mostly intact in both arms, but there are some less sensitive areas on the right secondary to the spinal cord injury. The AIN/PIN/Ulnar nerve function is intact in his left arm.     He sat well on the bed. He leans minimally to the right. His neurological examination remains stable. He has a long scar down his back from previous surgery as well as a well healed pump incision and there was no seroma in the pump pocket, although he continues to spit a few of the sutures. He also has no function of his legs and his right hand is nonfunctional. He had minimal spasticity on exam today or during the move from bed to chair.     Imaging that was personally reviewed: no new imaging today    Assessment/Plan: 24 y.o. 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 Today we interrogated, refilled, and reprogrammed the ITB pump. The skin overlying the pump was prepped with Betadine and draped using sterile technique. The refill port was identified with the template and cannulated  using a non-coring 22 gauge needle. The residual volume of 3.7 mL was removed and discarded; the expected volume was 3.7 mL. The needle was removed and the pump was electronically reprogrammed. The new volume of 20 mL was entered and the drug concentration is 2000.  The current dose is 230.7 mcg/day.  The ISAMAR is 18 months.  The next refill must be before 11/30/24.  It needs kit # [id number]. He can return prior to that time for dose increase if he wishes.

## 2024-07-03 ENCOUNTER — APPOINTMENT (OUTPATIENT)
Dept: UROLOGY | Facility: CLINIC | Age: 24
End: 2024-07-03
Payer: COMMERCIAL

## 2024-07-03 VITALS — TEMPERATURE: 97.5 F | HEART RATE: 74 BPM | SYSTOLIC BLOOD PRESSURE: 119 MMHG | DIASTOLIC BLOOD PRESSURE: 70 MMHG

## 2024-07-03 DIAGNOSIS — R82.90 ABNORMAL URINALYSIS: ICD-10-CM

## 2024-07-03 DIAGNOSIS — N31.9 NEUROGENIC BLADDER: ICD-10-CM

## 2024-07-03 DIAGNOSIS — N39.0 RECURRENT UTI: Primary | ICD-10-CM

## 2024-07-03 LAB
POC APPEARANCE, URINE: CLEAR
POC BILIRUBIN, URINE: NEGATIVE
POC BLOOD, URINE: NEGATIVE
POC COLOR, URINE: YELLOW
POC GLUCOSE, URINE: NEGATIVE MG/DL
POC KETONES, URINE: NEGATIVE MG/DL
POC LEUKOCYTES, URINE: ABNORMAL
POC NITRITE,URINE: NEGATIVE
POC PH, URINE: 5.5 PH
POC PROTEIN, URINE: NEGATIVE MG/DL
POC SPECIFIC GRAVITY, URINE: 1.01
POC UROBILINOGEN, URINE: 0.2 EU/DL

## 2024-07-03 PROCEDURE — 1036F TOBACCO NON-USER: CPT | Performed by: STUDENT IN AN ORGANIZED HEALTH CARE EDUCATION/TRAINING PROGRAM

## 2024-07-03 PROCEDURE — 99214 OFFICE O/P EST MOD 30 MIN: CPT | Performed by: STUDENT IN AN ORGANIZED HEALTH CARE EDUCATION/TRAINING PROGRAM

## 2024-07-03 PROCEDURE — 87186 SC STD MICRODIL/AGAR DIL: CPT

## 2024-07-03 PROCEDURE — 81003 URINALYSIS AUTO W/O SCOPE: CPT | Performed by: STUDENT IN AN ORGANIZED HEALTH CARE EDUCATION/TRAINING PROGRAM

## 2024-07-03 PROCEDURE — 87086 URINE CULTURE/COLONY COUNT: CPT

## 2024-07-06 LAB — BACTERIA UR CULT: ABNORMAL

## 2024-07-09 DIAGNOSIS — N39.0 RECURRENT UTI: ICD-10-CM

## 2024-07-09 NOTE — PROGRESS NOTES
Dr Vargas and Dr Vargas with Ped infectious disease discussed pt's urine culture   POC :  If he is still doing okay we could wait until the end of the week or early next week and retest.  If the amount of bacteria is going down then you are fine.  If it is holding steady then we can discuss treatment.  If he feels at ALL ill he has to come in stat for IV antibiotics.    If he doesn't want to take that risk then he needs to come in for IV antibiotics.      Nurse spoke to pt's mother who states pt is doing ok, no fevers , mood is good , good bowel movement , good urine output but cloudy . Pt having some muscle spasms which could mean he is fighting an infection . Mother feels comfortable giving another urine sample tomorrow .   Dr Vargas and Dr Vargas to be alerted

## 2024-07-10 ENCOUNTER — LAB (OUTPATIENT)
Dept: LAB | Facility: LAB | Age: 24
End: 2024-07-10
Payer: COMMERCIAL

## 2024-07-10 DIAGNOSIS — N39.0 RECURRENT UTI: ICD-10-CM

## 2024-07-10 PROCEDURE — 87086 URINE CULTURE/COLONY COUNT: CPT

## 2024-07-10 PROCEDURE — 87186 SC STD MICRODIL/AGAR DIL: CPT

## 2024-07-12 ENCOUNTER — DOCUMENTATION (OUTPATIENT)
Dept: UROLOGY | Facility: CLINIC | Age: 24
End: 2024-07-12
Payer: COMMERCIAL

## 2024-07-12 LAB
BACTERIA UR CULT: ABNORMAL
BACTERIA UR CULT: ABNORMAL

## 2024-07-12 NOTE — PROGRESS NOTES
Communication between Dr Vargas and Dr Vargas regarding pt's positive urine culture and admitting pt for IV abx . Dr Vargas reaching out to her fellow or one of the med/Peds Hospitalist's to help get pt direct admitted to [facility] .  Nurse contacted pt's mother who states  pt was started on cipro 500mg bid x 10 days yesterday by Dr Aguirre his allergist because of the urine culture. Pt was desensitized yesterday . The process took 7 hours ,so he is able to take cipro for this time only . If cipro is needed in the future , he would have to go through process again .  Pt is tolerating cipro well at this time. Dr Silva and Dr Vargas alerted . Will hold off on admission .

## 2024-07-12 NOTE — PROGRESS NOTES
Patient: Jose Fernandez    [id number]  : 2000 -- AGE 24 y.o.    Provider: Griselda Shea     Location [facility]   Service Date: 2024            [facility]  New Visit Note      HISTORY OF PRESENT ILLNESS     The patient's referring provider is: No ref. provider found Lyric Cameron from [place]     HISTORY OF PRESENT ILLNESS   Jose Fernandez is a 24 y.o. male who presents to a [facility] for an evaluation with concerns of reactive airway disease. I have independently interviewed and examined the patient in the office and reviewed available records.     I personally reviewed the note from Dr. Cameron notes from date May 2023 . This is contributing to my history, assessment, and plan:  This is a Dr. Cameron notes - this is a 22 yo with complicated medical history including underlying myelomalacia with hemiparesis, muscular weakness, restrictive lung disease, reactive airway disease/asthma, and history of respiratory insufficiency with BiPap supplementation at night, when he is ill - none in MANY years. Also remote history of tracheostomy; he was decannulated in 2005     He has hx peritonitis with associated pleural effusions. FVC is lower %62. FEV1 is stable around 70%. impaired airway clearance - would recommend something to use when sick - continue acapella PRN.        Current History    On today's visit, the patient reports as a child had a trach and ventilator as a child, he got removed about 4-5 years. He uses the nebs every 3 hours when he is sick. Denies SOB at rest. He is quadriplegic .    Denies orthopnea - he has baclofen pump as he can have spasticity, pnd, or matti.  Weight has been mostly stable.  Denies  chronic cough, denies wheezing, and denies clear, green, blood streaks. No night cough. No hemoptysis. No fever or shivering chills. Has no runny nose, or a tingling sensation in the back of his  throat. Also complains of heartburn - has pepcid twice a day. Denies chest pain or heartburn.     Previous pulmonary history: Has restrictive/reactive airway.     Inhalers/nebulized medications: symbicort - only using prn, albuterol nebulizer - when sick    Hospitalization History: Not been hospitalized over the last year for breathing related problem. PNA in 2021    Sleep history:  Denies snoring, apnea, feeling tired during the day or taking naps during the day.     Current History 7/24/2024    On today's visit, the patient reports Has used albuterol occasionally. Denies SOB at rest. He got RSV vaccine.  He currently is being treated for a UTI but denies cough wheezing fever chills runny nose postnasal drip or heartburn he notes he gets UTIs from Botox injection injections but has not had any pneumonia symptoms.  Only used albuterol a Few times      ALLERGIES AND MEDICATIONS     ALLERGIES  Allergies   Allergen Reactions    Ciprofloxacin Anaphylaxis    Sulfamethoxazole-Trimethoprim Anaphylaxis    Sutures Other     Patients mother states patient reacts to dissolveable suture     Fish Containing Products Hives    Other Other    Shellfish Containing Products Hives    Vancomycin Other     Red Man's syndrome        MEDICATIONS  Current Outpatient Medications   Medication Sig Dispense Refill    albuterol 2.5 mg /3 mL (0.083 %) nebulizer solution Take 3 mL (2.5 mg) by nebulization every 4 hours if needed for wheezing. Inhale one vial every 4-6 hours as needed for cough, wheezing and shortness of breath 75 mL 3    budesonide-formoteroL (Symbicort) 160-4.5 mcg/actuation inhaler INHALE 2 PUFFS every 4-6 hours as needed for prolonged cough, shortness of breath, and wheeze as well as 2 puffs once daily. MAX 12 puffs per day.      cholecalciferol (Vitamin D-3) 50 mcg (2,000 unit) capsule TAKE 1 CAPSULE Daily      cyanocobalamin (Vitamin B-12) 1,000 mcg tablet TAKE 1 TABLET DAILY AS DIRECTED.      docusate sodium (Colace) 100 mg  capsule Take 1 capsule (100 mg) by mouth once daily. 60 capsule 11    famotidine (Pepcid) 40 mg tablet Take 1 tablet (40 mg) by mouth 2 times a day. 60 tablet 11    ferrous sulfate ER (Slow Fe) 142 mg ER tablet Take 1 tablet by mouth once daily.      L. ACIDOPHILUS/BIFID. ANIMALIS ORAL Take 1 capsule by mouth once daily.      mometasone (Asmanex Twisthaler) 220 mcg/ actuation (14) inhaler Inhale 2 puffs once daily as needed. Rinse mouth with water after use to reduce aftertaste and incidence of candidiasis. Do not swallow.      montelukast (Singulair) 10 mg tablet Take 1 tablet (10 mg) by mouth once daily. 30 tablet 11    Myrbetriq 50 mg tablet extended release 24 hr 24 hr tablet take 1 tablet by mouth once daily 30 tablet 11    oxybutynin XL (Ditropan-XL) 15 mg 24 hr tablet take 1 tablet by mouth once daily 30 tablet 11    polyethylene glycol (Glycolax, Miralax) 17 gram/dose powder Take 17 g by mouth once daily. MIX IN 8 OUNCES OF WATER, JUICE, OR TEA AND DRINK DAILY. 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), Asthma ([facility]), GERD (gastroesophageal reflux disease), Hemiplegia (Multi), History of transfusion, Immunocompromised (Multi), Intra-abdominal abscess (Multi), Mitrofanoff appendicovesicostomy present (Multi), MSSA (methicillin susceptible Staphylococcus aureus), Neurogenic bladder,  Neurogenic bowel, Other conditions influencing health status (07/21/2013), Other conditions influencing health status (03/04/2022), Pectus excavatum (07/21/2013), Peritoneal abscess (Multi) (05/24/2021), Personal history of diseases of the skin and subcutaneous tissue (05/17/2019), Personal history of diseases of the skin and subcutaneous tissue, Personal history of other diseases of the digestive system (04/13/2020), Personal history of other infectious and parasitic diseases (07/24/2019), Personal history of other infectious and parasitic diseases (09/24/2018), Recurrent UTI (urinary tract infection), Restrictive lung disease, Scoliosis, unspecified, Snoring, Tinea pedis (07/23/2019), Urinary tract infection, UTI (urinary tract infection), and Xerosis cutis (07/23/2019).    PAST SURGICAL HISTORY  Past Surgical History:   Procedure Laterality Date    ACHILLES TENDON SURGERY  10/30/2013    Subcutaneous Tenotomy Achilles Tendon Under Gen Anesthesia    BACLOFEN PUMP IMPLANTATION  2019    BRONCHOSCOPY      CYSTOSCOPY      GASTROSTOMY  10/30/2013    Gastric Surgery Feeding Gastrostomy s/p removal    MITROFANOFF PROCEDURE  08/06/2018    NISSEN FUNDOPLICATION      2003    OTHER SURGICAL HISTORY  10/30/2013    Direct Laryngoscopy    OTHER SURGICAL HISTORY  10/30/2013    Closed Treatment Fx Of Proximal Femoral Neck W/ Manipulation    OTHER SURGICAL HISTORY  04/16/2015    Complex Repair Of Wound Trunk 2.6 To 7.5 Cm    OTHER SURGICAL HISTORY  10/14/2022    Circumcision    SPINAL FUSION      x2 2015, 2017    TRACHEOSTOMY TUBE PLACEMENT      s/p decannulation 2005    US GUIDED PERCUTANEOUS PERITONEAL OR RETROPERITONEAL FLUID COLLECTION DRAINAGE  04/08/2021    US GUIDED PERCUTANEOUS PERITONEAL OR RETROPERITONEAL FLUID COLLECTION DRAINAGE 4/8/2021 [facility]       IMMUNIZATION HISTORY  Immunization History   Administered Date(s) Administered    DTaP vaccine, pediatric  (INFANRIX) 2000, 2000, 2002,  06/06/2003, 09/28/2007    Flu vaccine (IIV4), preservative free *Check age/dose* 11/26/2014, 11/03/2017, 09/30/2020    HPV 9-valent vaccine (GARDASIL 9) 05/22/2024, 06/25/2024    Hep A, Unspecified 07/16/2010, 08/05/2011    Hep B, Unspecified 2002, 2002, 03/14/2003    HiB, unspecified 2002, 2002, 2002, 06/06/2003    Influenza, injectable, quadrivalent 03/04/2022    Influenza, seasonal, injectable 10/17/2008, 10/02/2009, 10/01/2022, 10/13/2022    Influenza, seasonal, injectable, preservative free 08/28/2012    MMR vaccine, subcutaneous (MMR II) 03/14/2003, 09/28/2007    Meningococcal ACWY vaccine (MENVEO) 09/30/2020    Meningococcal ACWY-D (Menactra) 4-valent conjugate vaccine 11/26/2014    Meningococcal B, Unspecified 09/30/2020    Meningococcal MPSV4 11/26/2014    Moderna SARS-CoV-2 Vaccination 02/13/2021, 03/13/2021, 05/01/2021    Novel influenza-H1N1-09, preservative-free 11/24/2009, 01/15/2010    Pneumococcal conjugate vaccine, 13-valent (PREVNAR 13) 2002, 2002, 03/14/2003, 06/06/2003, 10/17/2008    Pneumococcal polysaccharide vaccine, 23-valent, age 2 years and older (PNEUMOVAX 23) 03/04/2022    Polio, Unspecified 2002, 2002, 2002, 09/28/2007    Tdap vaccine, age 7 year and older (BOOSTRIX, ADACEL) 06/26/2014, 09/30/2020    Varicella vaccine, subcutaneous (VARIVAX) 03/14/2003, 09/28/2007       SOCIAL HISTORY  He  reports that he has never smoked. He has never been exposed to tobacco smoke. He has never used smokeless tobacco. He reports current alcohol use. He reports that he does not use drugs. He Patient     OCCUPATIONAL/ENVIRONMENTAL HISTORY  Previously worked as: disabled   DOES/DOES NOT EC: does not have known exposure to asbestos, silica, beryllium or inhaled metals.  DOES/DOES NOT EC: does not have exposure to birds or exotic animals.    FAMILY HISTORY  Family History   Problem Relation Name Age of Onset    Diabetes Maternal Grandfather        DOES/DOES NOT EC: does not have a family history of pulmonary disease.  DOES/DOES NOT EC: does not have a family history of cancer.  DOES/DOES NOT EC: does not have a family history of autoimmune disorders.    RESULTS/DATA     Pulmonary Function Test Results       PFT 5/11/2023      Document Information          Chest Radiograph     XR chest 1 view 08/08/2023    Narrative  Interpreted By:  RENEA FELIZ MD  MRN: [id number]  Patient Name: ELSI GERMAIN    STUDY:  CHEST 1 VIEW; ;  8/8/2023 11:41 am    INDICATION:  new O2 requirement and SOB .    COMPARISON:  08/06/2020.    ACCESSION NUMBER(S):  [id number]    ORDERING CLINICIAN:  CLOVIS BINGHAM    FINDINGS:    Impression  No significant changes.    Low lung volumes with bronchopulmonary crowding.    No focal consolidation.    No pleural effusion or pneumothorax seen.    Cardiac silhouette normal in size.    Stable postop changes of the spine fusion.      MACRO:  None      Chest CT Scan     No testing done       Echocardiogram     No testing done         REVIEW OF SYSTEMS     REVIEW OF SYSTEMS  Review of Systems   All other systems reviewed and are negative.        PHYSICAL EXAM     VITAL SIGNS: There were no vitals taken for this visit. There were no vitals taken for this visit. /76   Pulse 77   Resp 18   Wt 86.6 kg (191 lb)   SpO2 96%   BMI 28.21 kg/m²      CURRENT WEIGHT: [unfilled]  BMI: [unfilled]  PREVIOUS WEIGHTS:  Wt Readings from Last 3 Encounters:   06/06/24 88.4 kg (194 lb 14.2 oz)   05/22/24 88.5 kg (195 lb)   02/21/24 88.5 kg (195 lb)       Physical Exam  Constitutional:       Appearance: Normal appearance.   HENT:      Head: Normocephalic and atraumatic.      Right Ear: External ear normal.      Left Ear: External ear normal.      Nose: Nose normal.   Eyes:      Extraocular Movements: Extraocular movements intact.      Conjunctiva/sclera: Conjunctivae normal.      Pupils: Pupils are equal, round, and reactive to light.   Cardiovascular:      Rate  and Rhythm: Normal rate and regular rhythm.      Pulses: Normal pulses.      Heart sounds: Normal heart sounds.   Pulmonary:      Effort: Pulmonary effort is normal.      Breath sounds: Normal breath sounds.   Musculoskeletal:         General: Normal range of motion.      Cervical back: Normal range of motion and neck supple.      Comments: Upper extremity   Skin:     General: Skin is warm and dry.   Neurological:      General: No focal deficit present.      Mental Status: He is alert and oriented to person, place, and time. Mental status is at baseline.      Comments: In a wheel chair quadrapelgic   Psychiatric:         Mood and Affect: Mood normal.         Behavior: Behavior normal.         Thought Content: Thought content normal.         Judgment: Judgment normal.           ASSESSMENT/PLAN     Mr. Fernandez is a 24 y.o. 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), Asthma ([facility]), GERD (gastroesophageal reflux disease), Hemiplegia (Multi), History of transfusion, Immunocompromised (Multi), Intra-abdominal abscess (Multi), Mitrofanoff appendicovesicostomy present (Multi), MSSA (methicillin susceptible Staphylococcus aureus), Neurogenic bladder, Neurogenic bowel, Other conditions influencing health status (07/21/2013), Other conditions influencing health status (03/04/2022), Pectus excavatum (07/21/2013), Peritoneal abscess (Multi) (05/24/2021), Personal history of diseases of the skin and subcutaneous tissue (05/17/2019), Personal history of diseases of the skin and subcutaneous tissue, Personal history of other diseases of the digestive system (04/13/2020), Personal history of other infectious and parasitic diseases (07/24/2019), Personal history of other infectious and parasitic diseases (09/24/2018), Recurrent UTI (urinary tract infection), Restrictive lung disease, Scoliosis,  unspecified, Snoring, Tinea pedis (07/23/2019), Urinary tract infection, UTI (urinary tract infection), and Xerosis cutis (07/23/2019). He was referred to the [facility] for evaluation of reactive airway disease    Problem List and Orders      Assessment and Plan / Recommendations:  Problem List Items Addressed This Visit    None        Reactive airway disease  - albuterol hfa 2 puffs or albuterol nebs every 4-6 hours as needed  - cont symbicort as needed  - has acapella device he uses when he is sick  - bipap at night when he is sick  Allergy medications prescribed/continued today include: nasal spray as needed, Zyrtec/cetirizine as need, Singulair/montelukast daily      pulmonary function test and FENO 16 ---Spirometry shows no obstruction. There is no significant bronchodilator response. Lung volumes indicate a moderate restrictive defect. There is hyperinflation present. The DLCO corrected for hemoglobin is mildly reduced.         Thank you for visiting the Pulmonary clinic today!       Return to clinic after ___6 months and/or sooner if needed   Griselda Shea CNP  My office number is [phone] -     Any test results will be discussed at next visit -- please make sure to make a follow up appt after testing.

## 2024-07-19 ENCOUNTER — DOCUMENTATION (OUTPATIENT)
Dept: UROLOGY | Facility: CLINIC | Age: 24
End: 2024-07-19
Payer: COMMERCIAL

## 2024-07-19 NOTE — PROGRESS NOTES
pt 's mother states pt is almost done with cipro and his allergist  Dr Mishra recommends he stay on a low dose of cipro every day for maintenance . He will not be prescribing medication but recommends .  Dr Vargas and Dr Vargas alerted , Dr Vargas reaching out to Dr Mishra regarding not using cipro for UTI prophylaxis due to concern with higher resistance and virulence in organisms that do cause infection. Dr Vargas asking for  another option.  Awaiting response at this time

## 2024-07-23 DIAGNOSIS — G82.50 QUADRIPLEGIA AND QUADRIPARESIS (MULTI): Primary | ICD-10-CM

## 2024-07-23 DIAGNOSIS — B99.9 RECURRENT INFECTIONS: ICD-10-CM

## 2024-07-24 ENCOUNTER — LAB (OUTPATIENT)
Dept: LAB | Facility: LAB | Age: 24
End: 2024-07-24
Payer: COMMERCIAL

## 2024-07-24 ENCOUNTER — APPOINTMENT (OUTPATIENT)
Dept: PULMONOLOGY | Facility: CLINIC | Age: 24
End: 2024-07-24
Payer: COMMERCIAL

## 2024-07-24 VITALS
DIASTOLIC BLOOD PRESSURE: 76 MMHG | OXYGEN SATURATION: 96 % | RESPIRATION RATE: 18 BRPM | HEART RATE: 77 BPM | BODY MASS INDEX: 28.21 KG/M2 | WEIGHT: 191 LBS | SYSTOLIC BLOOD PRESSURE: 123 MMHG

## 2024-07-24 DIAGNOSIS — J45.20 MILD INTERMITTENT REACTIVE AIRWAY DISEASE WITHOUT COMPLICATION (HHS-HCC): ICD-10-CM

## 2024-07-24 DIAGNOSIS — G82.50 QUADRIPLEGIA AND QUADRIPARESIS (MULTI): ICD-10-CM

## 2024-07-24 DIAGNOSIS — B99.9 RECURRENT INFECTIONS: ICD-10-CM

## 2024-07-24 LAB
BASOPHILS # BLD AUTO: 0.04 X10*3/UL (ref 0–0.1)
BASOPHILS NFR BLD AUTO: 0.7 %
CRP SERPL-MCNC: 0.49 MG/DL
EOSINOPHIL # BLD AUTO: 0.17 X10*3/UL (ref 0–0.7)
EOSINOPHIL NFR BLD AUTO: 2.8 %
ERYTHROCYTE [DISTWIDTH] IN BLOOD BY AUTOMATED COUNT: 12.2 % (ref 11.5–14.5)
ERYTHROCYTE [SEDIMENTATION RATE] IN BLOOD BY WESTERGREN METHOD: 13 MM/H (ref 0–15)
HCT VFR BLD AUTO: 42.5 % (ref 41–52)
HGB BLD-MCNC: 14.4 G/DL (ref 13.5–17.5)
IMM GRANULOCYTES # BLD AUTO: 0.02 X10*3/UL (ref 0–0.7)
IMM GRANULOCYTES NFR BLD AUTO: 0.3 % (ref 0–0.9)
LYMPHOCYTES # BLD AUTO: 2.15 X10*3/UL (ref 1.2–4.8)
LYMPHOCYTES NFR BLD AUTO: 35 %
MCH RBC QN AUTO: 30.4 PG (ref 26–34)
MCHC RBC AUTO-ENTMCNC: 33.9 G/DL (ref 32–36)
MCV RBC AUTO: 90 FL (ref 80–100)
MONOCYTES # BLD AUTO: 0.47 X10*3/UL (ref 0.1–1)
MONOCYTES NFR BLD AUTO: 7.6 %
NEUTROPHILS # BLD AUTO: 3.3 X10*3/UL (ref 1.2–7.7)
NEUTROPHILS NFR BLD AUTO: 53.6 %
NRBC BLD-RTO: 0 /100 WBCS (ref 0–0)
PLATELET # BLD AUTO: 251 X10*3/UL (ref 150–450)
RBC # BLD AUTO: 4.73 X10*6/UL (ref 4.5–5.9)
WBC # BLD AUTO: 6.2 X10*3/UL (ref 4.4–11.3)

## 2024-07-24 PROCEDURE — 99214 OFFICE O/P EST MOD 30 MIN: CPT | Performed by: NURSE PRACTITIONER

## 2024-07-24 PROCEDURE — 86140 C-REACTIVE PROTEIN: CPT

## 2024-07-24 PROCEDURE — 85652 RBC SED RATE AUTOMATED: CPT

## 2024-07-24 PROCEDURE — 36415 COLL VENOUS BLD VENIPUNCTURE: CPT

## 2024-07-24 PROCEDURE — 85025 COMPLETE CBC W/AUTO DIFF WBC: CPT

## 2024-07-24 PROCEDURE — 1036F TOBACCO NON-USER: CPT | Performed by: NURSE PRACTITIONER

## 2024-07-24 RX ORDER — MONTELUKAST SODIUM 10 MG/1
10 TABLET ORAL DAILY
Qty: 30 TABLET | Refills: 11 | Status: SHIPPED | OUTPATIENT
Start: 2024-07-24 | End: 2024-08-23

## 2024-07-24 RX ORDER — ALBUTEROL SULFATE 0.83 MG/ML
2.5 SOLUTION RESPIRATORY (INHALATION) EVERY 4 HOURS PRN
Qty: 75 ML | Refills: 3 | Status: SHIPPED | OUTPATIENT
Start: 2024-07-24 | End: 2024-08-23

## 2024-07-24 RX ORDER — BUDESONIDE AND FORMOTEROL FUMARATE DIHYDRATE 160; 4.5 UG/1; UG/1
2 AEROSOL RESPIRATORY (INHALATION)
Qty: 10.2 G | Refills: 3 | Status: SHIPPED | OUTPATIENT
Start: 2024-07-24 | End: 2024-08-23

## 2024-07-24 ASSESSMENT — COLUMBIA-SUICIDE SEVERITY RATING SCALE - C-SSRS
6. HAVE YOU EVER DONE ANYTHING, STARTED TO DO ANYTHING, OR PREPARED TO DO ANYTHING TO END YOUR LIFE?: NO
1. IN THE PAST MONTH, HAVE YOU WISHED YOU WERE DEAD OR WISHED YOU COULD GO TO SLEEP AND NOT WAKE UP?: NO
2. HAVE YOU ACTUALLY HAD ANY THOUGHTS OF KILLING YOURSELF?: NO

## 2024-07-24 ASSESSMENT — ENCOUNTER SYMPTOMS
DEPRESSION: 0
LOSS OF SENSATION IN FEET: 0
OCCASIONAL FEELINGS OF UNSTEADINESS: 0

## 2024-07-24 NOTE — PATIENT INSTRUCTIONS
Reactive airway disease  - albuterol hfa 2 puffs or albuterol nebs every 4-6 hours as needed  - cont symbicort as needed  - has acapella device he uses when he is sick  - bipap at night when he is sick  Allergy medications prescribed/continued today include: nasal spray as needed, Zyrtec/cetirizine as need, Singulair/montelukast daily      pulmonary function test and FENO 16 ---Spirometry shows no obstruction. There is no significant bronchodilator response. Lung volumes indicate a moderate restrictive defect. There is hyperinflation present. The DLCO corrected for hemoglobin is mildly reduced.         Thank you for visiting the Pulmonary clinic today!       Return to clinic after ___6 months and/or sooner if needed   Griselda Shea CNP  My office number is [phone] -     Any test results will be discussed at next visit -- please make sure to make a follow up appt after testing.

## 2024-08-27 ENCOUNTER — TELEPHONE (OUTPATIENT)
Dept: UROLOGY | Facility: CLINIC | Age: 24
End: 2024-08-27
Payer: COMMERCIAL

## 2024-08-27 DIAGNOSIS — N31.9 NEUROGENIC BLADDER: ICD-10-CM

## 2024-08-27 DIAGNOSIS — R39.9 UTI SYMPTOMS: ICD-10-CM

## 2024-08-27 NOTE — TELEPHONE ENCOUNTER
Lakesha called in stating that she would like to know if she can bring in a urine sample for the pt due to UTI symptoms.     Spoke with Lakesha about UTI symptoms. Lakesha stated pt is having low output, muscle spasms, odor and dark colored urine. Urine culture ordered.    Lakesha stated that previous prescription for oxybutynin was for twice a day and new prescription is for once a day. Lakesha stated she would like a new prescription for twice a day to be sent to [facility] in [place]. New order set up for pt.    Lakesha stated that Myrbetriq has been denied by insurance. Lakesha stated notice from insurance stated pt needs to pick either oxybutynin or Myrbetriq, or complete a prior authorization with the insurance company.

## 2024-08-28 ENCOUNTER — LAB (OUTPATIENT)
Dept: LAB | Facility: LAB | Age: 24
End: 2024-08-28
Payer: COMMERCIAL

## 2024-08-28 DIAGNOSIS — R39.9 UTI SYMPTOMS: ICD-10-CM

## 2024-08-28 PROCEDURE — 87186 SC STD MICRODIL/AGAR DIL: CPT

## 2024-08-28 PROCEDURE — 87086 URINE CULTURE/COLONY COUNT: CPT

## 2024-08-29 DIAGNOSIS — N39.0 RECURRENT UTI: Primary | ICD-10-CM

## 2024-08-29 RX ORDER — OXYBUTYNIN CHLORIDE 15 MG/1
15 TABLET, EXTENDED RELEASE ORAL 2 TIMES DAILY
Qty: 60 TABLET | Refills: 11 | Status: CANCELLED | OUTPATIENT
Start: 2024-08-29

## 2024-08-29 RX ORDER — AMOXICILLIN AND CLAVULANATE POTASSIUM 875; 125 MG/1; MG/1
1 TABLET, FILM COATED ORAL 2 TIMES DAILY
Qty: 20 TABLET | Refills: 0 | Status: SHIPPED | OUTPATIENT
Start: 2024-08-29 | End: 2024-09-08

## 2024-08-29 NOTE — TELEPHONE ENCOUNTER
Spoke with Lakesha about augmentin medication order. Lakesha stated understanding. Lakesha requested if the oxybutynin order was approved by Dr Vargas. Advised Lakesha that this order will be sent back to Dr Vargas. Please advise.

## 2024-08-30 LAB — BACTERIA UR CULT: ABNORMAL

## 2024-09-03 DIAGNOSIS — N31.9 NEUROGENIC BLADDER: ICD-10-CM

## 2024-09-03 DIAGNOSIS — N39.45 URINARY INCONTINENCE WITH CONTINUOUS LEAKAGE: ICD-10-CM

## 2024-09-03 DIAGNOSIS — N30.00 ACUTE CYSTITIS WITHOUT HEMATURIA: Primary | ICD-10-CM

## 2024-09-03 RX ORDER — OXYBUTYNIN CHLORIDE 15 MG/1
15 TABLET, EXTENDED RELEASE ORAL 2 TIMES DAILY
Qty: 30 TABLET | Refills: 11 | Status: SHIPPED | OUTPATIENT
Start: 2024-09-03

## 2024-09-03 RX ORDER — MIRABEGRON 50 MG/1
50 TABLET, EXTENDED RELEASE ORAL DAILY
Qty: 30 TABLET | Refills: 11 | Status: SHIPPED | OUTPATIENT
Start: 2024-09-03

## 2024-09-03 RX ORDER — NITROFURANTOIN 25; 75 MG/1; MG/1
100 CAPSULE ORAL 2 TIMES DAILY
Qty: 14 CAPSULE | Refills: 0 | Status: SHIPPED | OUTPATIENT
Start: 2024-09-03 | End: 2024-09-10

## 2024-09-04 ENCOUNTER — OFFICE VISIT (OUTPATIENT)
Dept: NEUROSURGERY | Facility: HOSPITAL | Age: 24
End: 2024-09-04
Payer: COMMERCIAL

## 2024-09-04 VITALS
HEART RATE: 59 BPM | OXYGEN SATURATION: 94 % | DIASTOLIC BLOOD PRESSURE: 54 MMHG | SYSTOLIC BLOOD PRESSURE: 108 MMHG | TEMPERATURE: 98.1 F | RESPIRATION RATE: 22 BRPM

## 2024-09-04 DIAGNOSIS — R25.2 SPASTICITY: Primary | ICD-10-CM

## 2024-09-04 DIAGNOSIS — Z97.8 PRESENCE OF INTRATHECAL BACLOFEN PUMP: ICD-10-CM

## 2024-09-04 DIAGNOSIS — T81.31XS DEHISCENCE OF OPERATIVE WOUND, SEQUELA: ICD-10-CM

## 2024-09-04 PROCEDURE — 1036F TOBACCO NON-USER: CPT | Performed by: NEUROLOGICAL SURGERY

## 2024-09-04 PROCEDURE — 99203 OFFICE O/P NEW LOW 30 MIN: CPT | Performed by: NEUROLOGICAL SURGERY

## 2024-09-04 PROCEDURE — 99213 OFFICE O/P EST LOW 20 MIN: CPT | Performed by: NEUROLOGICAL SURGERY

## 2024-09-04 ASSESSMENT — PATIENT HEALTH QUESTIONNAIRE - PHQ9
SUM OF ALL RESPONSES TO PHQ9 QUESTIONS 1 AND 2: 0
1. LITTLE INTEREST OR PLEASURE IN DOING THINGS: NOT AT ALL
2. FEELING DOWN, DEPRESSED OR HOPELESS: NOT AT ALL

## 2024-09-04 ASSESSMENT — PAIN SCALES - GENERAL: PAINLEVEL: 0-NO PAIN

## 2024-09-04 NOTE — PROGRESS NOTES
[facility]   Neurosurgery    Diagnosis  Jose was seen today for new patient visit.  Diagnoses and all orders for this visit:  Spasticity  Presence of intrathecal baclofen pump  Dehiscence of operative wound, sequela      Patient Discussion/Summary  Jose has an intrathecal baclofen pump for treatment of incomplete quadriplegia.  Interestingly, he is allergic to absorbable sutures and the pump was implanted with nonabsorbable sutures.  This has led to chronic wound healing issues, such that he will have likely stitch abscesses present from time to time, which require lancing and drainage.  It does not seem that he has ever had a deeper infection.    This past weekend, he developed increasing erythema at the right lateral aspect of the pump.  Again, this was without distinct wound leakage.  He is on antibiotics for a concurrent urinary tract infection.  Over the past 2 days, this erythema has settled, but not completely resolved.  He has had no other dehiscence of his incision.    Given the fact that his incision is improving, we do not recommend any intervention.  Is likely that the antibiotics are also helping what is possibly a superficial incisional infection or stitch abscess.    We will order a CBC and CRP as a baseline, and to possibly trend a response if the incision were to worsen.  Otherwise, they may follow-up on an as-needed basis.      History of Present Illness  Chief Complaint:   Chief Complaint   Patient presents with    New Patient Visit         HPI: Jose Fernandez is a 24 y.o. male referred by Dr. Riley with spasticty. He has hx of C5-C7 incomplete quadriplegia d/t intrauterine hemorrhage with resultant spasticity s/p baclofen pump placement on 4/16/19. His pump was last interrogated, refilled, and reprogrammed the ITB pump on 6/27/24 & noted ISAMAR is 18 months. The current dose is 230.7 mcg/day. The next refill must be before 11/30/24. Patient presents today for evaluation for baclofen  pump replacement.     Patient is here with his mother & states that his pump is due for replacement 11/2025. His incision is pink with skin grown over old sutures. There appears to be pimple like bump on this incision that mother stated was red a couple of days ago. They were initially concerned that pump could be infected. He's had multiple ICU stays due to UTI/bladder infection. He's currently on Augmentin for UTI. Mother states that he's severely allergic to dissolvable sutures.      ROS: A complete 11 system ROS was performed (constitutional, eyes, ENT, cardiovascular, respiratory, GI, , musculoskeletal, skin, neurological, and psychiatric) and was negative aside from the pertinent positives and negatives noted in the HPI.      Previous History  Past Medical History:   Diagnosis Date    Abnormal results of pulmonary function studies 03/21/2019    Abnormal PFT    Acute infarction of spinal cord (embolic) (nonembolic) (Multi) 04/30/2020    intrauterine hemorrhage and subsequent cord infarction    Asthma ([facility])     last seen by Lyric Cameron 05/11/2023    GERD (gastroesophageal reflux disease)     s/p Nissen    Hemiplegia (Multi)     History of transfusion     Immunocompromised (Multi)     Intra-abdominal abscess (Multi)     recurrent    Mitrofanoff appendicovesicostomy present (Multi)     MSSA (methicillin susceptible Staphylococcus aureus)     MSSA R gluteal abscess 03/2023    Neurogenic bladder     Neurogenic bowel     Other conditions influencing health status 07/21/2013    Drug-induced Myelopathy    Other conditions influencing health status 03/04/2022    Abscess, abdomen    Pectus excavatum 07/21/2013    Pectus excavatum    Peritoneal abscess (Multi) 05/24/2021    Peritonitis with abscess of intestine    Personal history of diseases of the skin and subcutaneous tissue 05/17/2019    History of dermatitis    Personal history of diseases of the skin and subcutaneous tissue     History of eczema    Personal  history of other diseases of the digestive system 04/13/2020    History of chronic constipation    Personal history of other infectious and parasitic diseases 07/24/2019    History of onychomycosis    Personal history of other infectious and parasitic diseases 09/24/2018    History of wound infection    Recurrent UTI (urinary tract infection)     s/p Mitrofanoff    Restrictive lung disease     Scoliosis, unspecified     Kyphoscoliosis    Snoring     Snoring    Tinea pedis 07/23/2019    Tinea pedis of both feet    Urinary tract infection     UTI (urinary tract infection)     current    Xerosis cutis 07/23/2019    Xerosis of skin     Past Surgical History:   Procedure Laterality Date    ACHILLES TENDON SURGERY  10/30/2013    Subcutaneous Tenotomy Achilles Tendon Under Gen Anesthesia    BACLOFEN PUMP IMPLANTATION  2019    BRONCHOSCOPY      CYSTOSCOPY      GASTROSTOMY  10/30/2013    Gastric Surgery Feeding Gastrostomy s/p removal    MITROFANOFF PROCEDURE  08/06/2018    NISSEN FUNDOPLICATION      2003    OTHER SURGICAL HISTORY  10/30/2013    Direct Laryngoscopy    OTHER SURGICAL HISTORY  10/30/2013    Closed Treatment Fx Of Proximal Femoral Neck W/ Manipulation    OTHER SURGICAL HISTORY  04/16/2015    Complex Repair Of Wound Trunk 2.6 To 7.5 Cm    OTHER SURGICAL HISTORY  10/14/2022    Circumcision    SPINAL FUSION      x2 2015, 2017    TRACHEOSTOMY TUBE PLACEMENT      s/p decannulation 2005    US GUIDED PERCUTANEOUS PERITONEAL OR RETROPERITONEAL FLUID COLLECTION DRAINAGE  04/08/2021    US GUIDED PERCUTANEOUS PERITONEAL OR RETROPERITONEAL FLUID COLLECTION DRAINAGE 4/8/2021 [facility]     Social History     Tobacco Use    Smoking status: Never     Passive exposure: Never    Smokeless tobacco: Never   Vaping Use    Vaping status: Never Used   Substance Use Topics    Alcohol use: Yes     Alcohol/week: 0.0 - 3.0 standard drinks of alcohol     Comment: patient states socially    Drug use: Never     Family History    Problem Relation Name Age of Onset    Diabetes Maternal Grandfather       Allergies   Allergen Reactions    Ciprofloxacin Anaphylaxis    Sulfamethoxazole-Trimethoprim Anaphylaxis    Sutures Other     Patients mother states patient reacts to dissolveable suture     Fish Containing Products Hives    Other Other    Shellfish Containing Products Hives    Vancomycin Other     Red Man's syndrome      Current Outpatient Medications   Medication Instructions    albuterol 2.5 mg, nebulization, Every 4 hours PRN, Inhale one vial every 4-6 hours as needed for cough, wheezing and shortness of breath    amoxicillin-pot clavulanate (Augmentin) 875-125 mg tablet 1 tablet, oral, 2 times daily    budesonide-formoteroL (Symbicort) 160-4.5 mcg/actuation inhaler 2 puffs, inhalation, 2 times daily RT, Rinse mouth with water after use to reduce aftertaste and incidence of candidiasis. Do not swallow.    cholecalciferol (Vitamin D-3) 50 mcg (2,000 unit) capsule TAKE 1 CAPSULE Daily    cyanocobalamin (Vitamin B-12) 1,000 mcg tablet TAKE 1 TABLET DAILY AS DIRECTED.    docusate sodium (COLACE) 100 mg, oral, Daily    famotidine (PEPCID) 40 mg, oral, 2 times daily    ferrous sulfate ER (Slow Fe) 142 mg ER tablet 1 tablet, oral, Daily    L. ACIDOPHILUS/BIFID. ANIMALIS ORAL 1 capsule, oral, Daily    mirabegron (MYRBETRIQ) 50 mg, oral, Daily    mometasone (Asmanex Twisthaler) 220 mcg/ actuation (14) inhaler 2 puffs, inhalation, Daily PRN, Rinse mouth with water after use to reduce aftertaste and incidence of candidiasis. Do not swallow.    montelukast (SINGULAIR) 10 mg, oral, Daily    nitrofurantoin, macrocrystal-monohydrate, (Macrobid) 100 mg capsule 100 mg, oral, 2 times daily    oxybutynin XL (DITROPAN-XL) 15 mg, oral, 2 times daily    polyethylene glycol (GLYCOLAX, MIRALAX) 17 g, oral, Daily, MIX IN 8 OUNCES OF WATER, JUICE, OR TEA AND DRINK DAILY.         Vitals  /54 (BP Location: Left arm, Patient Position: Sitting, BP Cuff Size:  Large adult)   Pulse 59   Temp 36.7 °C (98.1 °F) (Temporal)   Resp 22   SpO2 94%       Physical Exam  The right abdominal intrathecal pump site was inspected.  There is evidence of previous nonabsorbable sutures with partial erosion.  The right lateral aspect was somewhat erythematous, however without fluctuance or purulence.

## 2024-09-05 ENCOUNTER — LAB (OUTPATIENT)
Dept: LAB | Facility: LAB | Age: 24
End: 2024-09-05
Payer: COMMERCIAL

## 2024-09-05 DIAGNOSIS — T81.31XS DEHISCENCE OF OPERATIVE WOUND, SEQUELA: ICD-10-CM

## 2024-09-05 DIAGNOSIS — Z97.8 PRESENCE OF INTRATHECAL BACLOFEN PUMP: ICD-10-CM

## 2024-09-05 LAB
CRP SERPL-MCNC: 0.46 MG/DL
ERYTHROCYTE [DISTWIDTH] IN BLOOD BY AUTOMATED COUNT: 12.1 % (ref 11.5–14.5)
HCT VFR BLD AUTO: 42.7 % (ref 41–52)
HGB BLD-MCNC: 14.5 G/DL (ref 13.5–17.5)
MCH RBC QN AUTO: 30.5 PG (ref 26–34)
MCHC RBC AUTO-ENTMCNC: 34 G/DL (ref 32–36)
MCV RBC AUTO: 90 FL (ref 80–100)
NRBC BLD-RTO: 0 /100 WBCS (ref 0–0)
PLATELET # BLD AUTO: 252 X10*3/UL (ref 150–450)
RBC # BLD AUTO: 4.76 X10*6/UL (ref 4.5–5.9)
WBC # BLD AUTO: 8.2 X10*3/UL (ref 4.4–11.3)

## 2024-09-05 PROCEDURE — 86140 C-REACTIVE PROTEIN: CPT

## 2024-09-05 PROCEDURE — 85027 COMPLETE CBC AUTOMATED: CPT

## 2024-09-05 PROCEDURE — 36415 COLL VENOUS BLD VENIPUNCTURE: CPT

## 2024-09-11 ENCOUNTER — HOSPITAL ENCOUNTER (OUTPATIENT)
Dept: RADIOLOGY | Facility: CLINIC | Age: 24
Discharge: HOME | End: 2024-09-11
Payer: COMMERCIAL

## 2024-09-11 PROCEDURE — 76770 US EXAM ABDO BACK WALL COMP: CPT

## 2024-09-11 PROCEDURE — 76770 US EXAM ABDO BACK WALL COMP: CPT | Performed by: RADIOLOGY

## 2024-10-03 ENCOUNTER — TELEPHONE (OUTPATIENT)
Dept: INFECTIOUS DISEASES | Facility: HOSPITAL | Age: 24
End: 2024-10-03
Payer: COMMERCIAL

## 2024-10-03 NOTE — TELEPHONE ENCOUNTER
Called and notified family of Dr. Vargas recommendations.   ----- Message from Lyric Vargas sent at 10/3/2024 10:45 AM EDT -----  Can you call this family and recommend they make an appointment with either Dr. Dotty Matthews or Dr. Robert Quispe.  Both of these adult ID doctors are very careful and thorough and will make a great partner to Jose's urologist for ongoing issues.  I have emailed both of them to offer to give sign out to whichever Jose ends up scheduled with.  I would recommend scheduling something before Jose gets sick rather than waiting for it to happen.    Thanks,  Lyric Vargas

## 2024-10-09 ENCOUNTER — APPOINTMENT (OUTPATIENT)
Dept: UROLOGY | Facility: CLINIC | Age: 24
End: 2024-10-09
Payer: COMMERCIAL

## 2024-10-09 VITALS — TEMPERATURE: 98.4 F | SYSTOLIC BLOOD PRESSURE: 103 MMHG | HEART RATE: 69 BPM | DIASTOLIC BLOOD PRESSURE: 65 MMHG

## 2024-10-09 DIAGNOSIS — N31.9 NEUROGENIC BLADDER: Primary | ICD-10-CM

## 2024-10-09 DIAGNOSIS — Z93.52: ICD-10-CM

## 2024-10-09 DIAGNOSIS — R82.90 ABNORMAL URINALYSIS: ICD-10-CM

## 2024-10-09 DIAGNOSIS — N39.45 URINARY INCONTINENCE WITH CONTINUOUS LEAKAGE: ICD-10-CM

## 2024-10-09 PROCEDURE — 99214 OFFICE O/P EST MOD 30 MIN: CPT | Performed by: STUDENT IN AN ORGANIZED HEALTH CARE EDUCATION/TRAINING PROGRAM

## 2024-10-09 PROCEDURE — 1036F TOBACCO NON-USER: CPT | Performed by: STUDENT IN AN ORGANIZED HEALTH CARE EDUCATION/TRAINING PROGRAM

## 2024-10-09 PROCEDURE — 87086 URINE CULTURE/COLONY COUNT: CPT

## 2024-10-09 PROCEDURE — 87186 SC STD MICRODIL/AGAR DIL: CPT

## 2024-10-09 PROCEDURE — 81003 URINALYSIS AUTO W/O SCOPE: CPT | Performed by: STUDENT IN AN ORGANIZED HEALTH CARE EDUCATION/TRAINING PROGRAM

## 2024-10-09 RX ORDER — OXYBUTYNIN CHLORIDE 15 MG/1
15 TABLET, EXTENDED RELEASE ORAL 2 TIMES DAILY
Qty: 30 TABLET | Refills: 11 | Status: CANCELLED | OUTPATIENT
Start: 2024-10-09

## 2024-10-09 RX ORDER — CEFAZOLIN SODIUM 2 G/100ML
2 INJECTION, SOLUTION INTRAVENOUS ONCE
OUTPATIENT
Start: 2024-10-09 | End: 2024-10-09

## 2024-10-09 RX ORDER — OXYBUTYNIN CHLORIDE 15 MG/1
15 TABLET, EXTENDED RELEASE ORAL 2 TIMES DAILY
Qty: 60 TABLET | Refills: 11 | Status: SHIPPED | OUTPATIENT
Start: 2024-10-09

## 2024-10-09 RX ORDER — OXYBUTYNIN CHLORIDE 15 MG/1
15 TABLET, EXTENDED RELEASE ORAL 2 TIMES DAILY
Qty: 30 TABLET | Refills: 11 | Status: SHIPPED | OUTPATIENT
Start: 2024-10-09 | End: 2024-10-09 | Stop reason: SDUPTHER

## 2024-10-09 NOTE — PROGRESS NOTES
Scribed for Dr. Adam Vargas by Jose Painting. I, Dr. Adam Vargas have personally reviewed and agreed with the information entered by the Virtual Scribe. 10/09/24.    ASSESSMENT:  Problem List Items Addressed This Visit       Appendico-vesicostomy in place (Multi)    Relevant Medications    onabotulinumtoxinA (Botox) injection 200 Units (Start on 12/12/2024 12:00 AM)    Other Relevant Orders    Request for Pre-Admission Testing Visit    Urine Culture    Neurogenic bladder - Primary    Relevant Medications    oxybutynin XL (Ditropan-XL) 15 mg 24 hr tablet    onabotulinumtoxinA (Botox) injection 200 Units (Start on 12/12/2024 12:00 AM)    Other Relevant Orders    POCT UA Automated manually resulted (Completed)    Request for Pre-Admission Testing Visit    Case Request Operating Room: CYSTOSCOPY, WITH BOTULINUM TOXIN INJECTION (200 U) (Completed)    Urine Culture     Other Visit Diagnoses       Abnormal urinalysis        Relevant Orders    Urine Culture             PLAN:  #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  #Neurogenic bowel + bladder  #Urinary incontinence  #OAB - spasms  S/p cysto, bladder botox and exp lap with me (10/20/23)  S/p bladder botox (200 UI) with me (06/06/24).  Reports good response to latest round of botox.   States his incontinence seems improved, no longer wet in the morning.   Is able to withhold urine better, allowing more time in-between catheterizing.  Remains satisfied with results of the procedure.   Continue oxybutynin BID and Myrbetriq 50 mg.   Refills provided.     Repeat renal ultrasound in Sept 2025 for surveillance.   Plan to repeat round of bladder botox (200 UI) in Dec 2024.  Risks, benefits, and complications reviewed.   Tentative OR date of Dec 5th//Dec 12th 2024.     #Recurrent UTI's  S/p mitrofanoff with Dr. Myles.  Urine culture grew klebsiella species (06/06/24).   S/p PO antibiotics, UA showed trace leuks today.   No signs of blood or ongoing infection.    Continue Macrodantin 50 mg daily for UTI suppression.      TO REVIEW:  Treat empirically with course of Augmentin for presumed UTI.  Encouraged to call if he develops any UTI or acute//worsening symptoms.   Encouraged to call if he develops issues with cathing, retention or leakage.   Can consider 300 UI botox at next procedure when this is indicated.    All questions were answered to the patient’s satisfaction.  Patient agrees with the plan and wishes to proceed.  Continue follow-up for ongoing care of his chronic medical conditions.       History of Present Illness (HPI):  Jose presents for a post-op visit.   The patient’s EMR has been reviewed.  Lives in [place], [place]  Accompanied by Mother whom provides additional history.  Previously followed by Pediatric Urology with Dr. Workman.     Hx of spinal injury 2/2 intrauterine stroke and cord infarction.  Subsequent myelomalacia, hemiparesis, neurogenic bowel and bladder.  Hx of recurrent UTIs s/p mitrofanoff with Dr. Myles  Was experiencing recurrent intra-abdominal abscess, now resolving with improved catheter timing and technique     UDS//CT and MRI imaging reviewed:  bladder capacity appears in the 300-400ml range which should be well managed with his current CIC regimen and oral medications. Numerous CT and MRI images reviewed going back to 2021, possible diverticula vs. thin area seen in the bladder dome, small right posterior diverticulum, nothing significant, most recent CT without clear/definitive perforation obvious.     S/p cysto, bladder botox and exp lap with me (10/20/23)  Ex-lap unsuccessful due to intraabdominal adhesions  Cystoscopy, cystogram, and looposcopy/loopogram did not reveal any perforation. There were well-healed areas of injury in the posterior wall of the bladder near the Mitrofanoff entry point that I feel are likely due to injuries with catheterization.    S/p bladder botox (200 UI) with me (06/06/24).  Reports good response to latest  round of botox.   States his incontinence seems improved, no longer wet in the morning.   Remains satisfied with results of the procedure.   Continue oxybutynin BID and Myrbetriq as well.     TODAY: (10/09/24)  Presents for follow up and ultrasound results.   Reports he has been doing well overall.   States he continues to benefit from latest round of botox.   States he has been able to withhold his urine better.   Allowing for more time between having to cath.   Remains satisfied with results of procedure.   No recent infections, gross hematuria, fevers or chills.   Continues on oxybutynin as well with good response.   Denies side-effects.     RBUS (09/11/24) reviewed.   No renal, ureteral, or bladder pathology.   No tumor, or stones.     TO REVIEW: Last visit (07/03/24)  Presents for a post-op visit.   States his leakage seems improved s/p latest round of botox.   Reporting that he no longer wakes up wet in the morning.   Continues oxybutynin BID (morning and evening) as well.   Continues with baclofen pump 2/2 muscle spasms.   Recently increased pump dose.   Denies any recent UTI's.   IO UA showed trace leuks, no blood or signs of infection.     TO REVIEW: (02/21/24)  Reports he has been doing well overall.   Reports significant improvement since starting bladder botox.   However, continues to have recurrent UTI's.   Albeit, he has only been symptomatic for 1-2 days before resolving.   Continues daily Macrobid for prophylaxis.   Denies any gross hematuria, fevers or chills.      TO REVIEW: (11/15/23)  Accompanied by Mother whom provides additional history.  Concerned that he may have a UTI given his malodorous urine.  Reports having about 3x UTI's in the past 6 months.   Not currently on antibiotics for prophylaxis.      S/p courses of keflex, amikacin and gentamicin washes for prophylaxis.   Tolerated levofloxacin with benadryl in the past.   Has tolerated macrobid in the past as well.   Continues CIC, no  issues.   Feel botox is working  Cathing for more volumes and less of an interval, less leakage in pull-up      Notes he appears to be more constipated since the procedure.   Gradually improving post-operatively.      TO REVIEW: (09/06/23)  Doing well s/p latest hospitalization for an intraabdominal infection.  Continues flagyl and ceftriaxone for treatment. Followed by ID.  ID is concerned given his ESR remains elevated.  Plans to undergo further imaging.  Denies any recent infections.     Continues CIC via mitrafanoff, without significant issue.  C/o persistent OAB symptoms.   No significant benefit with myrbetriq.  Denies any side-effects.     TO REVIEW: (08/23/23)  Myrbetriq helping, denies any side-effects.  Doing well s/p latest hospitalization, still has indwelling urethral catheter  Evaluated by ID yesterday, continues flagyl and ceftriaxone for treatment.  Continues to cath mitrofanoff once daily to promote patency without issue  Denies any gross hematuria, flank pain, recent fevers or chills.      TO REVIEW: Initial visit (07/27/23)  Recently treated for sepsis 2/2 a MSSA R gluteal abscess. (March 2023)  Followed by Pediatric surgery (Dr. Resendiz) for this, s/p vessel loop placement (04/17/23).     Underwent a post-op MRI (05/23/23) 2/2 concerns of infectious recurrence.  MRI was negative for osteomyelitis but did show residual fluid in the abdomen.   Described as phlegmon. Currently he is symptom free and completed antibiotics.     Continues to follow up with pediatric ID, with Dr. Lyric Vargas.   Initially, he was performing gentamicin washes s/p mitrafanoff.   Transitioned to amikacin bladder washes, however discontinued 2/2 adverse reactions. Discontinued this about 1-2 months ago.      Continues to cath his stoma throughout the day.  Mother caths via his penis once before bed and in the morning to fully drain his bladder.   Denies having to irrigate or any significant mucus with this.  Occasionally,  she will irrigate if he has a UTI.  He denies any recent UTIs.  Constipation has been under control.      At baseline, he leaks occasionally.   Albeit, he states this has been much improved since taking max dose oxybutynin.  His OAB symptoms and leakage are not too bothersome at this time.  He does not feel he requires additional treatment for this right now.  But he may reconsider this if his symptoms worsen.      PSHx: Multiple gastric surgeries (3x for closure of gastrocutaneous fistula, feeding gastrostomy placement). Hx of baclofen pump.   Allergies: Bactrim (anaphylaxis), vancomycin (rash), ciprofloxacin (urticaria    Past Medical History:   Diagnosis Date    Abnormal results of pulmonary function studies 03/21/2019    Abnormal PFT    Acute infarction of spinal cord (embolic) (nonembolic) (Multi) 04/30/2020    intrauterine hemorrhage and subsequent cord infarction    Asthma ([facility])     last seen by Lyric Cameron 05/11/2023    GERD (gastroesophageal reflux disease)     s/p Nissen    Hemiplegia (Multi)     History of transfusion     Immunocompromised (Multi)     Intra-abdominal abscess (Multi)     recurrent    Mitrofanoff appendicovesicostomy present (Multi)     MSSA (methicillin susceptible Staphylococcus aureus)     MSSA R gluteal abscess 03/2023    Neurogenic bladder     Neurogenic bowel     Other conditions influencing health status 07/21/2013    Drug-induced Myelopathy    Other conditions influencing health status 03/04/2022    Abscess, abdomen    Pectus excavatum 07/21/2013    Pectus excavatum    Peritoneal abscess (Multi) 05/24/2021    Peritonitis with abscess of intestine    Personal history of diseases of the skin and subcutaneous tissue 05/17/2019    History of dermatitis    Personal history of diseases of the skin and subcutaneous tissue     History of eczema    Personal history of other diseases of the digestive system 04/13/2020    History of chronic constipation    Personal history of other  infectious and parasitic diseases 07/24/2019    History of onychomycosis    Personal history of other infectious and parasitic diseases 09/24/2018    History of wound infection    Recurrent UTI (urinary tract infection)     s/p Mitrofanoff    Restrictive lung disease     Scoliosis, unspecified     Kyphoscoliosis    Snoring     Snoring    Tinea pedis 07/23/2019    Tinea pedis of both feet    Urinary tract infection     UTI (urinary tract infection)     current    Xerosis cutis 07/23/2019    Xerosis of skin     Past Surgical History:   Procedure Laterality Date    ACHILLES TENDON SURGERY  10/30/2013    Subcutaneous Tenotomy Achilles Tendon Under Gen Anesthesia    BACLOFEN PUMP IMPLANTATION  2019    BRONCHOSCOPY      CYSTOSCOPY      GASTROSTOMY  10/30/2013    Gastric Surgery Feeding Gastrostomy s/p removal    MITROFANOFF PROCEDURE  08/06/2018    NISSEN FUNDOPLICATION      2003    OTHER SURGICAL HISTORY  10/30/2013    Direct Laryngoscopy    OTHER SURGICAL HISTORY  10/30/2013    Closed Treatment Fx Of Proximal Femoral Neck W/ Manipulation    OTHER SURGICAL HISTORY  04/16/2015    Complex Repair Of Wound Trunk 2.6 To 7.5 Cm    OTHER SURGICAL HISTORY  10/14/2022    Circumcision    SPINAL FUSION      x2 2015, 2017    TRACHEOSTOMY TUBE PLACEMENT      s/p decannulation 2005    US GUIDED PERCUTANEOUS PERITONEAL OR RETROPERITONEAL FLUID COLLECTION DRAINAGE  04/08/2021    US GUIDED PERCUTANEOUS PERITONEAL OR RETROPERITONEAL FLUID COLLECTION DRAINAGE 4/8/2021 [facility]     Family History   Problem Relation Name Age of Onset    Diabetes Maternal Grandfather       Social History     Tobacco Use   Smoking Status Never    Passive exposure: Never   Smokeless Tobacco Never     Current Outpatient Medications   Medication Sig Dispense Refill    albuterol 2.5 mg /3 mL (0.083 %) nebulizer solution Take 3 mL (2.5 mg) by nebulization every 4 hours if needed for wheezing. Inhale one vial every 4-6 hours as needed for cough, wheezing  and shortness of breath 75 mL 3    budesonide-formoteroL (Symbicort) 160-4.5 mcg/actuation inhaler Inhale 2 puffs 2 times a day. Rinse mouth with water after use to reduce aftertaste and incidence of candidiasis. Do not swallow. 10.2 g 3    cholecalciferol (Vitamin D-3) 50 mcg (2,000 unit) capsule TAKE 1 CAPSULE Daily      cyanocobalamin (Vitamin B-12) 1,000 mcg tablet TAKE 1 TABLET DAILY AS DIRECTED.      docusate sodium (Colace) 100 mg capsule Take 1 capsule (100 mg) by mouth once daily. 60 capsule 11    famotidine (Pepcid) 40 mg tablet Take 1 tablet (40 mg) by mouth 2 times a day. 60 tablet 11    ferrous sulfate ER (Slow Fe) 142 mg ER tablet Take 1 tablet by mouth once daily.      L. ACIDOPHILUS/BIFID. ANIMALIS ORAL Take 1 capsule by mouth once daily.      mirabegron (Myrbetriq) 50 mg tablet extended release 24 hr 24 hr tablet Take 1 tablet (50 mg) by mouth once daily. 30 tablet 11    mometasone (Asmanex Twisthaler) 220 mcg/ actuation (14) inhaler Inhale 2 puffs once daily as needed. Rinse mouth with water after use to reduce aftertaste and incidence of candidiasis. Do not swallow.      montelukast (Singulair) 10 mg tablet Take 1 tablet (10 mg) by mouth once daily. 30 tablet 11    oxybutynin XL (Ditropan-XL) 15 mg 24 hr tablet Take 1 tablet (15 mg) by mouth 2 times a day. 30 tablet 11    polyethylene glycol (Glycolax, Miralax) 17 gram/dose powder Take 17 g by mouth once daily. MIX IN 8 OUNCES OF WATER, JUICE, OR TEA AND DRINK DAILY. 510 g 11     Current Facility-Administered Medications   Medication Dose Route Frequency Provider Last Rate Last Admin    baclofen (Lioresal) 2,000 mcg/mL intrathecal injection  300 mcg/day intrathecal Continuous Yoko Riley MD 0.006 mL/hr at 06/27/24 1125 300 mcg/day at 06/27/24 1125    baclofen (Lioresal) intrathecal injection  100 mcg/day intrathecal Continuous Yoko Riley MD 0.002 mL/hr at 01/11/24 1101 100 mcg/day at 01/11/24 1101     Allergies   Allergen  Reactions    Ciprofloxacin Anaphylaxis    Sulfamethoxazole-Trimethoprim Anaphylaxis    Sutures Other     Patients mother states patient reacts to dissolveable suture     Fish Containing Products Hives    Other Other    Shellfish Containing Products Hives    Vancomycin Other     Red Man's syndrome      Past medical, surgical, family and social history in the chart was reviewed and is accurate including any additions to what is in this HPI.    REVIEW OF SYSTEMS (ROS):   Constitutional: denies any unintentional weight loss or change in strength.  Integumentary: denies any rashes or pruritus.  Eyes: denies any double vision or eye pain.  Ear/Nose/Mouth/Throat: denies any nosebleeds or gum bleeds.  Cardiovascular: denies any chest pain or syncope.  Respiratory: denies hemoptysis.  Gastrointestinal: denies nausea or vomiting.  Musculoskeletal: denies muscle cramping or weakness.  Neurologic: denies convulsions or seizures.  Hematologic/Lymphatic: denies bleeding tendencies.  Endocrine: denies heat/cold intolerance.  All other systems have been reviewed and are negative unless otherwise noted in the HPI.     OBJECTIVE:  Visit Vitals  /65   Pulse 69   Temp 36.9 °C (98.4 °F) (Temporal)       PHYSICAL EXAM:  Constitutional: No obvious distress.  Eyes: Non-injected conjunctiva, sclera clear, EOMI.  Ears/Nose/Mouth/Throat: No obvious drainage per ears or nose.  Cardiovascular: Extremities are warm and well perfused. No edema, cyanosis or pallor.  Respiratory: No audible wheezing/stridor; respirations do not appear labored.  Gastrointestinal: Abdomen soft, not distended.  Musculoskeletal: Normal ROM of extremities.  Skin: No obvious rashes or open sores.  Neurologic: Alert and oriented, CN 2-12 grossly intact.  Psychiatric: Answers questions appropriately with normal affect.  Hematologic/Lymphatic/Immunologic: No obvious bruises or sites of spontaneous bleeding.  Genitourinary: No CVA tenderness, bladder not palpable.      LABS & IMAGING:  Lab Results   Component Value Date    WBC 8.2 09/05/2024    HGB 14.5 09/05/2024    HCT 42.7 09/05/2024     09/05/2024    CHOL 192 06/03/2024    TRIG 207 (H) 06/03/2024    HDL 40.0 06/03/2024    ALT 24 06/03/2024    AST 22 06/03/2024     06/03/2024    K 4.0 06/03/2024     06/03/2024    CREATININE 0.57 06/03/2024    BUN 11 06/03/2024    CO2 27 06/03/2024    TSH 1.86 06/03/2024    INR 1.3 (H) 08/11/2023     Scribed for Dr. Adam Vargas by Jose Painting.  I, Dr. Adam Vargas have personally reviewed and agreed with the information entered by the Virtual Scribe. 10/09/24.

## 2024-10-12 LAB — BACTERIA UR CULT: ABNORMAL

## 2024-10-15 DIAGNOSIS — N39.0 URINARY TRACT INFECTION WITHOUT HEMATURIA, SITE UNSPECIFIED: ICD-10-CM

## 2024-10-15 RX ORDER — NITROFURANTOIN 25; 75 MG/1; MG/1
100 CAPSULE ORAL 2 TIMES DAILY
Qty: 20 CAPSULE | Refills: 0 | Status: SHIPPED | OUTPATIENT
Start: 2024-10-15 | End: 2024-10-25

## 2024-11-07 ENCOUNTER — TELEPHONE (OUTPATIENT)
Dept: UROLOGY | Facility: CLINIC | Age: 24
End: 2024-11-07
Payer: COMMERCIAL

## 2024-11-21 ENCOUNTER — OFFICE VISIT (OUTPATIENT)
Dept: ORTHOPEDIC SURGERY | Facility: CLINIC | Age: 24
End: 2024-11-21
Payer: COMMERCIAL

## 2024-11-21 DIAGNOSIS — G82.50 QUADRIPLEGIA AND QUADRIPARESIS (MULTI): Primary | ICD-10-CM

## 2024-11-21 PROCEDURE — 2500000004 HC RX 250 GENERAL PHARMACY W/ HCPCS (ALT 636 FOR OP/ED): Mod: JZ | Performed by: ORTHOPAEDIC SURGERY

## 2024-11-21 PROCEDURE — 99213 OFFICE O/P EST LOW 20 MIN: CPT | Performed by: ORTHOPAEDIC SURGERY

## 2024-11-21 PROCEDURE — 62370 ANL SP INF PMP W/MDREPRG&FIL: CPT | Performed by: ORTHOPAEDIC SURGERY

## 2024-11-21 NOTE — PROGRESS NOTES
Jose Fernandez is a 24 y.o. 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 Pump originally implanted on 4/16/19. Since most recent visit, he has had some episodes of spasticity in his legs. This has been especially evident with bladder related issues. Otherwise he is doing well, continues to work at the [employer] and has started driving.    Physical Exam:  General: Well developed, well nourished male in no acute distress.  Skin: The skin is intact with no evidence of abrasions, bruises, or swelling. I can still see the deep sutures, but there are no signs of infection.  Vascular: There are 2+ pulses in both upper extremities and brisk capillary refill.  Neuro: The light touch sensation is mostly intact in both arms, but there are some less sensitive areas on the right secondary to the spinal cord injury. The AIN/PIN/Ulnar nerve function is intact in his left arm.     He sat well on the bed. He leans minimally to the right. His neurological examination remains stable. He has a long scar down his back from previous surgery as well as a well healed pump incision and there was no seroma in the pump pocket, although he continues to spit a few of the sutures. He also has no function of his legs and his right hand is nonfunctional. He had minimal spasticity on exam today or during the move from bed to chair.     Imaging that was personally reviewed: no new imaging today    Assessment/Plan: 24 y.o. 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 Today we interrogated, refilled, and reprogrammed the ITB pump. The skin overlying the pump was prepped with Betadine and draped using sterile technique. The refill port was identified with the template and cannulated using a non-coring 22 gauge needle. The residual volume of  3.5 mL was removed and discarded; the expected volume was 3.5 mL. The needle was removed and the pump was electronically reprogrammed. The new volume of 20 mL was entered and the drug concentration is 2000.  The current dose is 276.8 mcg/day.  The ISAMAR is 13 months.  The next refill must be before 3/31/25.  It needs kit # [id number]. He is in need of his pump replacement in the next few months, so we are going to plan to do that in March.

## 2024-11-26 ENCOUNTER — APPOINTMENT (OUTPATIENT)
Dept: PRIMARY CARE | Facility: CLINIC | Age: 24
End: 2024-11-26
Payer: COMMERCIAL

## 2024-11-26 DIAGNOSIS — Z23 IMMUNIZATION DUE: Primary | ICD-10-CM

## 2024-11-26 PROCEDURE — 90471 IMMUNIZATION ADMIN: CPT | Performed by: INTERNAL MEDICINE

## 2024-11-26 PROCEDURE — 90651 9VHPV VACCINE 2/3 DOSE IM: CPT | Performed by: INTERNAL MEDICINE

## 2024-11-26 NOTE — PROGRESS NOTES
Subjective   Jose Fernandez is a 24 y.o. male who presents for No chief complaint on file..      Patient  here  today  for the 3rd  HPV  vaccine    Mom  Lakesha  in the  room with  patient  for appointment         Review of Systems    Objective   There were no vitals taken for this visit.      Physical Exam    Assessment & Plan  Immunization due              Melanie Luque, CMA

## 2024-12-02 ENCOUNTER — CLINICAL SUPPORT (OUTPATIENT)
Dept: PREADMISSION TESTING | Facility: HOSPITAL | Age: 24
End: 2024-12-02
Payer: COMMERCIAL

## 2024-12-02 NOTE — CPM/PAT H&P
[facility]/PAT Evaluation       Name: Jose Fernandez (Jose Fernandez)  /Age: 2000/24 y.o.     {[facility] Visit Type:58596}      Chief Complaint: ***    HPI    Past Medical History:   Diagnosis Date    Abnormal results of pulmonary function studies 2019    Abnormal PFT    Acute infarction of spinal cord (embolic) (nonembolic) (Multi) 2020    intrauterine hemorrhage and subsequent cord infarction    Asthma     last seen by Lyric Cameron 2023    GERD (gastroesophageal reflux disease)     s/p Nissen    Hemiplegia (Multi)     History of transfusion     Immunocompromised     Intra-abdominal abscess (Multi)     recurrent    Mitrofanoff appendicovesicostomy present (Multi)     MSSA (methicillin susceptible Staphylococcus aureus)     MSSA R gluteal abscess 2023    Neurogenic bladder     Neurogenic bowel     Other conditions influencing health status 2013    Drug-induced Myelopathy    Other conditions influencing health status 2022    Abscess, abdomen    Pectus excavatum 2013    Pectus excavatum    Peritoneal abscess (Multi) 2021    Peritonitis with abscess of intestine    Personal history of diseases of the skin and subcutaneous tissue 2019    History of dermatitis    Personal history of diseases of the skin and subcutaneous tissue     History of eczema    Personal history of other diseases of the digestive system 2020    History of chronic constipation    Personal history of other infectious and parasitic diseases 2019    History of onychomycosis    Personal history of other infectious and parasitic diseases 2018    History of wound infection    Recurrent UTI (urinary tract infection)     s/p Mitrofanoff    Restrictive lung disease     Scoliosis, unspecified     Kyphoscoliosis    Snoring     Snoring    Tinea pedis 2019    Tinea pedis of both feet    Urinary tract infection     UTI (urinary tract infection)     current    Xerosis cutis 2019     Xerosis of skin       Past Surgical History:   Procedure Laterality Date    ACHILLES TENDON SURGERY  10/30/2013    Subcutaneous Tenotomy Achilles Tendon Under Gen Anesthesia    BACLOFEN PUMP IMPLANTATION  2019    BRONCHOSCOPY      CYSTOSCOPY      GASTROSTOMY  10/30/2013    Gastric Surgery Feeding Gastrostomy s/p removal    MITROFANOFF PROCEDURE  08/06/2018    NISSEN FUNDOPLICATION      2003    OTHER SURGICAL HISTORY  10/30/2013    Direct Laryngoscopy    OTHER SURGICAL HISTORY  10/30/2013    Closed Treatment Fx Of Proximal Femoral Neck W/ Manipulation    OTHER SURGICAL HISTORY  04/16/2015    Complex Repair Of Wound Trunk 2.6 To 7.5 Cm    OTHER SURGICAL HISTORY  10/14/2022    Circumcision    SPINAL FUSION      x2 2015, 2017    TRACHEOSTOMY TUBE PLACEMENT      s/p decannulation 2005    US GUIDED PERCUTANEOUS PERITONEAL OR RETROPERITONEAL FLUID COLLECTION DRAINAGE  04/08/2021    US GUIDED PERCUTANEOUS PERITONEAL OR RETROPERITONEAL FLUID COLLECTION DRAINAGE 4/8/2021 [facility]       Patient Sexual activity questions deferred to the physician.    Family History   Problem Relation Name Age of Onset    Diabetes Maternal Grandfather         Allergies   Allergen Reactions    Ciprofloxacin Anaphylaxis    Sulfamethoxazole-Trimethoprim Anaphylaxis    Sutures Other     Patients mother states patient reacts to dissolveable suture     Fish Containing Products Hives    Other Other    Shellfish Containing Products Hives    Vancomycin Other     Red Man's syndrome        Prior to Admission medications    Medication Sig Start Date End Date Taking? Authorizing Provider   albuterol 2.5 mg /3 mL (0.083 %) nebulizer solution Take 3 mL (2.5 mg) by nebulization every 4 hours if needed for wheezing. Inhale one vial every 4-6 hours as needed for cough, wheezing and shortness of breath 7/24/24 8/23/24  Griselda Shea, APRN-CNP   budesonide-formoteroL (Symbicort) 160-4.5 mcg/actuation inhaler Inhale 2 puffs 2 times a day. Rinse  mouth with water after use to reduce aftertaste and incidence of candidiasis. Do not swallow. 7/24/24 8/23/24  PATRICK Youssef   cholecalciferol (Vitamin D-3) 50 mcg (2,000 unit) capsule TAKE 1 CAPSULE Daily 6/17/22   Historical Provider, MD   cyanocobalamin (Vitamin B-12) 1,000 mcg tablet TAKE 1 TABLET DAILY AS DIRECTED. 6/17/22   Historical Provider, MD   docusate sodium (Colace) 100 mg capsule Take 1 capsule (100 mg) by mouth once daily. 1/8/24 1/7/25  PATRICK Weir   famotidine (Pepcid) 40 mg tablet Take 1 tablet (40 mg) by mouth 2 times a day. 1/8/24   PATRICK Weir   ferrous sulfate ER (Slow Fe) 142 mg ER tablet Take 1 tablet by mouth once daily. 2/5/19   Historical Provider, MD   L. ACIDOPHILUS/BIFID. ANIMALIS ORAL Take 1 capsule by mouth once daily.    Historical Provider, MD   mirabegron (Myrbetriq) 50 mg tablet extended release 24 hr 24 hr tablet Take 1 tablet (50 mg) by mouth once daily. 9/3/24   Adam Vargas MD   mometasone (Asmanex Twisthaler) 220 mcg/ actuation (14) inhaler Inhale 2 puffs once daily as needed. Rinse mouth with water after use to reduce aftertaste and incidence of candidiasis. Do not swallow.    Historical Provider, MD   montelukast (Singulair) 10 mg tablet Take 1 tablet (10 mg) by mouth once daily. 7/24/24 8/23/24  PATRICK Youssef   oxybutynin XL (Ditropan-XL) 15 mg 24 hr tablet Take 1 tablet (15 mg) by mouth 2 times a day. 10/9/24   Adam Vargas MD   polyethylene glycol (Glycolax, Miralax) 17 gram/dose powder Take 17 g by mouth once daily. MIX IN 8 OUNCES OF WATER, JUICE, OR TEA AND DRINK DAILY. 1/8/24 1/7/25  PATRICK Weir        PAT ROS     PAT Physical Exam     Airway    Testing/Diagnostic:   ECG 8/9/23:  Sinus tachycardia  Otherwise normal ECG  No previous ECGs available  Confirmed by Jose A Resendiz ([id number]) on 8/9/2023 6:18:53 PM    Patient Specialist/PCP:   PCP - Troy Chiang DO  - annual exam 5/22/24    Ortho Surg - 11/21/24 - Yoko Riley MD - C5-C7 incomplete quadriplegia d/t intrauterine hemorrhage with resultant spasticity s/p baclofen pump placement     Urology - 10/9/24 - Adam Vargas MD - neurogenic bladder, recurrent UTIs s/p mitrofanoff, recurrent intra-abdominal abscess, now resolving with improved catheter timing and technique    Neuro Surg - 9/4/24 - Troy Jerome MD - Spasticity, Presence of intrathecal baclofen pump, Dehiscence of operative wound, sequela    Pulm - 7/24/24 - Griselda Shea APRN-CNP - Reactive airway disease, albuterol and symbicort PRN, acapella device and bipap when sick    GI - 1/8/24 - IRINA Lees-CNP - neurogenic bowel 2/2 paralysis due to an intrauterine stroke.      CPM/PAT - 10/12/23 - IRINA Campbell-CNP - periop risk assessment 2/2 hx of spinal injury secondary to intrauterine hemorrhage and subsequent cord infarction, hemiparesis, neurogenic bowel and bladder, Mitrofanoff, OAB, recurrent UTIs, and recurrent intra-abdominal abscesses.     There were no vitals taken for this visit.    DASI Risk Score      Flowsheet Row Clinical Support from 10/12/2023 in [facility]   Can you take care of yourself (eat, dress, bathe, or use toilet)?  2.75 filed at 10/12/2023 0918   Can you walk indoors, such as around your house? 1.75 filed at 10/12/2023 0918   Can you walk a block or two on level ground?  2.75 filed at 10/12/2023 0918   Can you climb a flight of stairs or walk up a hill? 5.5 filed at 10/12/2023 0918   Can you run a short distance? 8 filed at 10/12/2023 0918   Can you do light work around the house like dusting or washing dishes? 0 filed at 10/12/2023 0918   Can you do moderate work around the house like vacuuming, sweeping floors or carrying groceries? 0 filed at 10/12/2023 0918   Can you do heavy work around the house like scrubbing floors or lifting and moving heavy furniture?  0 filed at 10/12/2023  0918   Can you do yard work like raking leaves, weeding or pushing a mower? 0 filed at 10/12/2023 0918   Can you have sexual relations? 5.25 filed at 10/12/2023 0918   Can you participate in moderate recreational activities like golf, bowling, dancing, doubles tennis or throwing a baseball or football? 6 filed at 10/12/2023 0918   Can you participate in strenous sports like swimming, singles tennis, football, basketball, or skiing? 7.5 filed at 10/12/2023 0918   DASI SCORE 39.5 filed at 10/12/2023 0918   METS Score (Will be calculated only when all the questions are answered) 7.6 filed at 10/12/2023 0918          Caprini DVT Assessment      Flowsheet Row Clinical Support from 10/12/2023 in [facility]   DVT Score 4 filed at 10/12/2023 0934   BMI 30 or less filed at 10/12/2023 0934   RETIRED: Current Status Major surgery planned, lasting 2-3 hours filed at 10/12/2023 0934   RETIRED: History Prior major surgery filed at 10/12/2023 0934   RETIRED: Age Less than 40 years filed at 10/12/2023 0934          Modified Frailty Index      Flowsheet Row Clinical Support from 10/12/2023 in [facility]   Non-independent functional status (problems with dressing, bathing, personal grooming, or cooking) 0.0909 filed at 10/12/2023 0941   History of diabetes mellitus  0 filed at 10/12/2023 0943   History of COPD 0 filed at 10/12/2023 0943   History of CHF No filed at 10/12/2023 0943   History of MI 0 filed at 10/12/2023 0943   History of Percutaneous Coronary Intervention, Cardiac Surgery, or Angina No filed at 10/12/2023 0943   Hypertension requiring the use of medication  0 filed at 10/12/2023 0943   Peripheral vascular disease 0 filed at 10/12/2023 0943   Impaired sensorium (cognitive impairement or loss, clouding, or delirium) 0 filed at 10/12/2023 0943   TIA or CVA withouy residual deficit 0 filed at 10/12/2023 0943   Cerebrovascular accident with deficit 0.0909 filed at 10/12/2023 0943   Modified  Frailty Index Calculator .0909 filed at 10/12/2023 0941          CHADS2 Stroke Risk         N/A 3 to 100%: High Risk   2 to < 3%: Medium Risk   0 to < 2%: Low Risk     Last Change: N/A          This score determines the patient's risk of having a stroke if the patient has atrial fibrillation.        This score is not applicable to this patient. Components are not calculated.          Revised Cardiac Risk Index      Flowsheet Row Clinical Support from 10/12/2023 in [facility]   High-Risk Surgery (Intraperitoneal, Intrathoracic,Suprainguinal vascular) 0 filed at 10/12/2023 0941   History of ischemic heart disease (History of MI, History of positive exercuse test, Current chest paint considered due to myocardial ischemia, Use of nitrate therapy, ECG with pathological Q Waves) 0 filed at 10/12/2023 0941   History of congestive heart failure (pulmonary edemia, bilateral rales or S3 gallop, Paroxysmal nocturnal dyspnea, CXR showing pulmonary vascular redistribution) 0 filed at 10/12/2023 0941   History of cerebrovascular disease (Prior TIA or stroke) 0 filed at 10/12/2023 0941   Pre-operative insulin treatment 0 filed at 10/12/2023 0941   Pre-operative creatinine>2 mg/dl 0 filed at 10/12/2023 0941   Revised Cardiac Risk Calculator 0 filed at 10/12/2023 0941          Apfel Simplified Score      Flowsheet Row Clinical Support from 10/12/2023 in [facility]   Smoking status 1 filed at 10/12/2023 0943   History of motion sickness or PONV  0 filed at 10/12/2023 0943   Use of postoperative opioids 1 filed at 10/12/2023 0943   Apfel Simplified Score Calculator 2 filed at 10/12/2023 0943          Risk Analysis Index Results This Encounter    No data found in the last 10 encounters.       Stop Bang Score      Flowsheet Row Clinical Support from 10/12/2023 in [facility]   Do you snore loudly? 1 filed at 10/12/2023 0918   Do you often feel tired or fatigued after your sleep? 0 filed  at 10/12/2023 0918   Has anyone ever observed you stop breathing in your sleep? 0 filed at 10/12/2023 0918   Do you have or are you being treated for high blood pressure? 0 filed at 10/12/2023 0918   Recent BMI (Calculated) 28.1 filed at 10/12/2023 0918   Is BMI greater than 35 kg/m2? 0=No filed at 10/12/2023 0918   Age older than 50 years old? 0=No filed at 10/12/2023 0918          Prodigy: High Risk  Total Score: 8              Prodigy Gender Score          ARISCAT Score for Postoperative Pulmonary Complications    No data to display       Schmidt Perioperative Risk for Myocardial Infarction or Cardiac Arrest (BJ)    No data to display         Assessment and Plan:     {[facility] EMBEDDED ASSESSMENT AND PLAN:91049}

## 2024-12-09 ENCOUNTER — ANESTHESIA EVENT (OUTPATIENT)
Dept: OPERATING ROOM | Facility: HOSPITAL | Age: 24
End: 2024-12-09
Payer: COMMERCIAL

## 2024-12-09 ENCOUNTER — PRE-ADMISSION TESTING (OUTPATIENT)
Dept: PREADMISSION TESTING | Facility: HOSPITAL | Age: 24
End: 2024-12-09
Payer: COMMERCIAL

## 2024-12-09 VITALS
DIASTOLIC BLOOD PRESSURE: 80 MMHG | BODY MASS INDEX: 29.62 KG/M2 | WEIGHT: 200 LBS | SYSTOLIC BLOOD PRESSURE: 121 MMHG | OXYGEN SATURATION: 95 % | TEMPERATURE: 98.9 F | HEART RATE: 83 BPM | HEIGHT: 69 IN

## 2024-12-09 DIAGNOSIS — Z01.811 PREOPERATIVE RESPIRATORY EXAMINATION: ICD-10-CM

## 2024-12-09 DIAGNOSIS — Z93.52: ICD-10-CM

## 2024-12-09 DIAGNOSIS — Z01.818 PREOPERATIVE CLEARANCE: Primary | ICD-10-CM

## 2024-12-09 DIAGNOSIS — N31.9 NEUROGENIC BLADDER: ICD-10-CM

## 2024-12-09 DIAGNOSIS — Z41.9 SURGERY, ELECTIVE: ICD-10-CM

## 2024-12-09 DIAGNOSIS — Z01.810 PREOPERATIVE CARDIOVASCULAR EXAMINATION: ICD-10-CM

## 2024-12-09 LAB
ANION GAP SERPL CALC-SCNC: 15 MMOL/L (ref 10–20)
BUN SERPL-MCNC: 11 MG/DL (ref 6–23)
CALCIUM SERPL-MCNC: 9.1 MG/DL (ref 8.6–10.6)
CHLORIDE SERPL-SCNC: 104 MMOL/L (ref 98–107)
CO2 SERPL-SCNC: 25 MMOL/L (ref 21–32)
CREAT SERPL-MCNC: 0.44 MG/DL (ref 0.5–1.3)
EGFRCR SERPLBLD CKD-EPI 2021: >90 ML/MIN/1.73M*2
GLUCOSE SERPL-MCNC: 76 MG/DL (ref 74–99)
POTASSIUM SERPL-SCNC: 5.1 MMOL/L (ref 3.5–5.3)
SODIUM SERPL-SCNC: 139 MMOL/L (ref 136–145)

## 2024-12-09 PROCEDURE — 36415 COLL VENOUS BLD VENIPUNCTURE: CPT

## 2024-12-09 PROCEDURE — 80048 BASIC METABOLIC PNL TOTAL CA: CPT

## 2024-12-09 PROCEDURE — 99204 OFFICE O/P NEW MOD 45 MIN: CPT | Performed by: ANESTHESIOLOGY

## 2024-12-09 ASSESSMENT — DUKE ACTIVITY SCORE INDEX (DASI)
CAN YOU DO YARD WORK LIKE RAKING LEAVES, WEEDING OR PUSHING A MOWER: NO
CAN YOU WALK INDOORS, SUCH AS AROUND YOUR HOUSE: NO
CAN YOU DO LIGHT WORK AROUND THE HOUSE LIKE DUSTING OR WASHING DISHES: YES
TOTAL_SCORE: 18.95
CAN YOU DO HEAVY WORK AROUND THE HOUSE LIKE SCRUBBING FLOORS OR LIFTING AND MOVING HEAVY FURNITURE: NO
CAN YOU RUN A SHORT DISTANCE: NO
CAN YOU PARTICIPATE IN STRENOUS SPORTS LIKE SWIMMING, SINGLES TENNIS, FOOTBALL, BASKETBALL, OR SKIING: YES
CAN YOU TAKE CARE OF YOURSELF (EAT, DRESS, BATHE, OR USE TOILET): YES
CAN YOU WALK A BLOCK OR TWO ON LEVEL GROUND: NO
DASI METS SCORE: 5.1
CAN YOU HAVE SEXUAL RELATIONS: NO
CAN YOU DO MODERATE WORK AROUND THE HOUSE LIKE VACUUMING, SWEEPING FLOORS OR CARRYING GROCERIES: NO
CAN YOU CLIMB A FLIGHT OF STAIRS OR WALK UP A HILL: NO
CAN YOU PARTICIPATE IN MODERATE RECREATIONAL ACTIVITIES LIKE GOLF, BOWLING, DANCING, DOUBLES TENNIS OR THROWING A BASEBALL OR FOOTBALL: YES

## 2024-12-09 ASSESSMENT — ENCOUNTER SYMPTOMS
EYES NEGATIVE: 1
ENDOCRINE NEGATIVE: 1
CONSTITUTIONAL NEGATIVE: 1
NEUROLOGICAL NEGATIVE: 1
RESPIRATORY NEGATIVE: 1
NECK NEGATIVE: 1
GASTROINTESTINAL NEGATIVE: 1
CARDIOVASCULAR NEGATIVE: 1

## 2024-12-09 ASSESSMENT — LIFESTYLE VARIABLES: SMOKING_STATUS: NONSMOKER

## 2024-12-09 NOTE — CPM/PAT H&P
CPM/PAT Evaluation       Name: Jose Fernandez (Jose Fernandez)  /Age: 2000/24 y.o.     In-Person       Chief Complaint: Botox for neurogenic bladder with Dr. Silva on     HPI  Mr. Fernandez is a 23 y/o male with PMHx significnat for C5-C7 incomplete quadriplegia d/t intrauterine hemorrhage with resultant spasticity s/p baclofen pump placement and neurogenic bladder with chronic UTIs and multiple episosdes of sepsis 2/2 to UIT (last event was about a year ago). There is hx of reactive airway disease and respirtory insufficiency-->s/p trache and multiple intubations/icu admission for complications however this was over 20 years ago. Per mother who is extremely involved and knowledgeable on pt history, these respiratory complications were over 20 years ago, he sees pulm regularly (last visit 6 months ago) and he's had multiple anesthesia procedures since with no perioperative respiratory issues. She reports he does very well with anesthesia.     No hx of MI, palpitations, chest pain, CVA, DT/PE or diabetes. Per mother, he sees cardiology regularly and has never had any problems.       Past Medical History:   Diagnosis Date    Abnormal results of pulmonary function studies 2019    Abnormal PFT    Acute infarction of spinal cord (embolic) (nonembolic) (Multi) 2020    intrauterine hemorrhage and subsequent cord infarction    Asthma     last seen by Lyric Cameron 2023    GERD (gastroesophageal reflux disease)     s/p Nissen    Hemiplegia (Multi)     History of transfusion     Immunocompromised     Intra-abdominal abscess (Multi)     recurrent    Mitrofanoff appendicovesicostomy present (Multi)     MSSA (methicillin susceptible Staphylococcus aureus)     MSSA R gluteal abscess 2023    Neurogenic bladder     Neurogenic bowel     Other conditions influencing health status 2013    Drug-induced Myelopathy    Other conditions influencing health status 2022    Abscess, abdomen     Pectus excavatum 07/21/2013    Pectus excavatum    Peritoneal abscess (Multi) 05/24/2021    Peritonitis with abscess of intestine    Personal history of diseases of the skin and subcutaneous tissue 05/17/2019    History of dermatitis    Personal history of diseases of the skin and subcutaneous tissue     History of eczema    Personal history of other diseases of the digestive system 04/13/2020    History of chronic constipation    Personal history of other infectious and parasitic diseases 07/24/2019    History of onychomycosis    Personal history of other infectious and parasitic diseases 09/24/2018    History of wound infection    Recurrent UTI (urinary tract infection)     s/p Mitrofanoff    Restrictive lung disease     Scoliosis, unspecified     Kyphoscoliosis    Snoring     Snoring    Tinea pedis 07/23/2019    Tinea pedis of both feet    Urinary tract infection     UTI (urinary tract infection)     current    Xerosis cutis 07/23/2019    Xerosis of skin       Past Surgical History:   Procedure Laterality Date    ACHILLES TENDON SURGERY  10/30/2013    Subcutaneous Tenotomy Achilles Tendon Under Gen Anesthesia    BACLOFEN PUMP IMPLANTATION  2019    BRONCHOSCOPY      CYSTOSCOPY      GASTROSTOMY  10/30/2013    Gastric Surgery Feeding Gastrostomy s/p removal    MITROFANOFF PROCEDURE  08/06/2018    NISSEN FUNDOPLICATION      2003    OTHER SURGICAL HISTORY  10/30/2013    Direct Laryngoscopy    OTHER SURGICAL HISTORY  10/30/2013    Closed Treatment Fx Of Proximal Femoral Neck W/ Manipulation    OTHER SURGICAL HISTORY  04/16/2015    Complex Repair Of Wound Trunk 2.6 To 7.5 Cm    OTHER SURGICAL HISTORY  10/14/2022    Circumcision    SPINAL FUSION      x2 2015, 2017    TRACHEOSTOMY TUBE PLACEMENT      s/p decannulation 2005    US GUIDED PERCUTANEOUS PERITONEAL OR RETROPERITONEAL FLUID COLLECTION DRAINAGE  04/08/2021    US GUIDED PERCUTANEOUS PERITONEAL OR RETROPERITONEAL FLUID COLLECTION DRAINAGE 4/8/2021 [facility]     Social hx: Pt works at [employer]; denies any smoking, alcohol or drug use.     Family History   Problem Relation Name Age of Onset    Diabetes Maternal Grandfather         Allergies   Allergen Reactions    Ciprofloxacin Anaphylaxis    Sulfamethoxazole-Trimethoprim Anaphylaxis    Sutures Other     Patients mother states patient reacts to dissolveable suture     Fish Containing Products Hives    Other Other    Shellfish Containing Products Hives    Vancomycin Other     Red Man's syndrome        Prior to Admission medications    Medication Sig Start Date End Date Taking? Authorizing Provider   cholecalciferol (Vitamin D-3) 50 mcg (2,000 unit) capsule TAKE 1 CAPSULE Daily 6/17/22  Yes Historical Provider, MD   cyanocobalamin (Vitamin B-12) 1,000 mcg tablet TAKE 1 TABLET DAILY AS DIRECTED. 6/17/22  Yes Historical Provider, MD   docusate sodium (Colace) 100 mg capsule Take 1 capsule (100 mg) by mouth once daily. 1/8/24 1/7/25 Yes PATRICK Weir   famotidine (Pepcid) 40 mg tablet Take 1 tablet (40 mg) by mouth 2 times a day. 1/8/24  Yes PATRICK Weir   ferrous sulfate ER (Slow Fe) 142 mg ER tablet Take 1 tablet by mouth once daily. 2/5/19  Yes Historical Provider, MD   L. ACIDOPHILUS/BIFID. ANIMALIS ORAL Take 1 capsule by mouth once daily.   Yes Historical Provider, MD   mirabegron (Myrbetriq) 50 mg tablet extended release 24 hr 24 hr tablet Take 1 tablet (50 mg) by mouth once daily. 9/3/24  Yes Adam Vargas MD   montelukast (Singulair) 10 mg tablet Take 1 tablet (10 mg) by mouth once daily. 7/24/24 12/9/24 Yes PATRICK Youssef   oxybutynin XL (Ditropan-XL) 15 mg 24 hr tablet Take 1 tablet (15 mg) by mouth 2 times a day. 10/9/24  Yes Adam Vargas MD   polyethylene glycol (Glycolax, Miralax) 17 gram/dose powder Take 17 g by mouth once daily. MIX IN 8 OUNCES OF WATER, JUICE, OR TEA AND DRINK DAILY. 1/8/24 1/7/25 Yes PATRICK Weir    albuterol 2.5 mg /3 mL (0.083 %) nebulizer solution Take 3 mL (2.5 mg) by nebulization every 4 hours if needed for wheezing. Inhale one vial every 4-6 hours as needed for cough, wheezing and shortness of breath 7/24/24 8/23/24  Griselda M IRINA Shea-CNP   budesonide-formoteroL (Symbicort) 160-4.5 mcg/actuation inhaler Inhale 2 puffs 2 times a day. Rinse mouth with water after use to reduce aftertaste and incidence of candidiasis. Do not swallow. 7/24/24 8/23/24  Griselda LU IRINA Shea-CNP   mometasone (Asmanex Twisthaler) 220 mcg/ actuation (14) inhaler Inhale 2 puffs once daily as needed. Rinse mouth with water after use to reduce aftertaste and incidence of candidiasis. Do not swallow.  Patient not taking: Reported on 12/9/2024    Historical Provider, MD MORAN ROS:   Constitutional:   neg    Neuro/Psych:   neg    Eyes:   neg    Ears:   neg    Nose:   neg    Mouth:   neg    Throat:   neg    Neck:   neg    Cardio:   neg    Respiratory:   neg    Endocrine:   neg    GI:   neg    :    Some urinary frequency   Musculoskeletal:   Hematologic:   neg    Skin:  neg        Physical Exam  Constitutional:       Appearance: Normal appearance.      Comments: In motorized chair   HENT:      Head: Normocephalic and atraumatic.      Mouth/Throat:      Mouth: Mucous membranes are moist.   Cardiovascular:      Rate and Rhythm: Normal rate and regular rhythm.   Pulmonary:      Effort: Pulmonary effort is normal.      Breath sounds: Normal breath sounds.   Skin:     General: Skin is warm and dry.   Neurological:      General: No focal deficit present.      Mental Status: He is alert and oriented to person, place, and time.   Psychiatric:         Mood and Affect: Mood normal.         Behavior: Behavior normal.         Thought Content: Thought content normal.         Judgment: Judgment normal.      Comments: Very pleasant, conversant and answers all questions appropriately. No deficits apart from baseline LE weakness.        "    PAT AIRWAY:   Airway:     Mallampati::  I    TM distance::  >3 FB    Neck ROM::  Full  normal        Testing/Diagnostic: Cannot view Cardiology work-up     Patient Specialist/PCP: sees multiple specialists    Visit Vitals  /80   Pulse 83   Temp 37.2 °C (98.9 °F) (Temporal)   Ht 1.753 m (5' 9\")   Wt 90.7 kg (200 lb)   SpO2 95%   BMI 29.53 kg/m²   Smoking Status Never   BSA 2.1 m²       DASI Risk Score      Flowsheet Row Pre-Admission Testing from 12/9/2024 in [facility] Clinical Support from 10/12/2023 in [facility]   Can you take care of yourself (eat, dress, bathe, or use toilet)?  2.75 filed at 12/09/2024 0754 2.75 filed at 10/12/2023 0918   Can you walk indoors, such as around your house? 0 filed at 12/09/2024 0754 1.75 filed at 10/12/2023 0918   Can you walk a block or two on level ground?  0 filed at 12/09/2024 0754 2.75 filed at 10/12/2023 0918   Can you climb a flight of stairs or walk up a hill? 0 filed at 12/09/2024 0754 5.5 filed at 10/12/2023 0918   Can you run a short distance? 0 filed at 12/09/2024 0754 8 filed at 10/12/2023 0918   Can you do light work around the house like dusting or washing dishes? 2.7 filed at 12/09/2024 0754 0 filed at 10/12/2023 0918   Can you do moderate work around the house like vacuuming, sweeping floors or carrying groceries? 0 filed at 12/09/2024 0754 0 filed at 10/12/2023 0918   Can you do heavy work around the house like scrubbing floors or lifting and moving heavy furniture?  0 filed at 12/09/2024 0754 0 filed at 10/12/2023 0918   Can you do yard work like raking leaves, weeding or pushing a mower? 0 filed at 12/09/2024 0754 0 filed at 10/12/2023 0918   Can you have sexual relations? 0 filed at 12/09/2024 0754 5.25 filed at 10/12/2023 0918   Can you participate in moderate recreational activities like golf, bowling, dancing, doubles tennis or throwing a baseball or football? 6 filed at 12/09/2024 0754 6 filed at 10/12/2023 0918 "   Can you participate in strenous sports like swimming, singles tennis, football, basketball, or skiing? 7.5 filed at 12/09/2024 0754 7.5 filed at 10/12/2023 0918   DASI SCORE 18.95 filed at 12/09/2024 0754 39.5 filed at 10/12/2023 0918   METS Score (Will be calculated only when all the questions are answered) 5.1 filed at 12/09/2024 0754 7.6 filed at 10/12/2023 0918          Caprini DVT Assessment      Flowsheet Row Pre-Admission Testing from 12/9/2024 in [facility] Clinical Support from 10/12/2023 in [facility]   DVT Score 2 filed at 12/09/2024 0838 4 filed at 10/12/2023 0934   Surgical Factors Minor surgery planned filed at 12/09/2024 0838 --   BMI 30 or less filed at 12/09/2024 0838 30 or less filed at 10/12/2023 0934   RETIRED: Current Status -- Major surgery planned, lasting 2-3 hours filed at 10/12/2023 0934   RETIRED: History -- Prior major surgery filed at 10/12/2023 0934   RETIRED: Age -- Less than 40 years filed at 10/12/2023 0934          Modified Frailty Index      Flowsheet Row Clinical Support from 10/12/2023 in [facility]   Non-independent functional status (problems with dressing, bathing, personal grooming, or cooking) 0.0909 filed at 10/12/2023 0941   History of diabetes mellitus  0 filed at 10/12/2023 0943   History of COPD 0 filed at 10/12/2023 0943   History of CHF No filed at 10/12/2023 0943   History of MI 0 filed at 10/12/2023 0943   History of Percutaneous Coronary Intervention, Cardiac Surgery, or Angina No filed at 10/12/2023 0943   Hypertension requiring the use of medication  0 filed at 10/12/2023 0943   Peripheral vascular disease 0 filed at 10/12/2023 0943   Impaired sensorium (cognitive impairement or loss, clouding, or delirium) 0 filed at 10/12/2023 0943   TIA or CVA withouy residual deficit 0 filed at 10/12/2023 0943   Cerebrovascular accident with deficit 0.0909 filed at 10/12/2023 0943   Modified Frailty Index Calculator .0909  filed at 10/12/2023 0941          CHADS2 Stroke Risk         N/A 3 to 100%: High Risk   2 to < 3%: Medium Risk   0 to < 2%: Low Risk     Last Change: N/A          This score determines the patient's risk of having a stroke if the patient has atrial fibrillation.        This score is not applicable to this patient. Components are not calculated.          Revised Cardiac Risk Index      Flowsheet Row Pre-Admission Testing from 12/9/2024 in [facility] Clinical Support from 10/12/2023 in [facility]   High-Risk Surgery (Intraperitoneal, Intrathoracic,Suprainguinal vascular) 0 filed at 12/09/2024 0843 0 filed at 10/12/2023 0941   History of ischemic heart disease (History of MI, History of positive exercuse test, Current chest paint considered due to myocardial ischemia, Use of nitrate therapy, ECG with pathological Q Waves) 0 filed at 12/09/2024 0843 0 filed at 10/12/2023 0941   History of congestive heart failure (pulmonary edemia, bilateral rales or S3 gallop, Paroxysmal nocturnal dyspnea, CXR showing pulmonary vascular redistribution) 0 filed at 12/09/2024 0843 0 filed at 10/12/2023 0941   History of cerebrovascular disease (Prior TIA or stroke) 1 filed at 12/09/2024 0843 0 filed at 10/12/2023 0941   Pre-operative insulin treatment 0 filed at 12/09/2024 0843 0 filed at 10/12/2023 0941   Pre-operative creatinine>2 mg/dl 0 filed at 12/09/2024 0843 0 filed at 10/12/2023 0941   Revised Cardiac Risk Calculator 1 filed at 12/09/2024 0843 0 filed at 10/12/2023 0941          Apfel Simplified Score      Flowsheet Row Pre-Admission Testing from 12/9/2024 in [facility] Clinical Support from 10/12/2023 in [facility]   Smoking status 1 filed at 12/09/2024 0837 1 filed at 10/12/2023 0943   History of motion sickness or PONV  0 filed at 12/09/2024 0837 0 filed at 10/12/2023 0943   Use of postoperative opioids 0 filed at 12/09/2024 0837 1 filed at 10/12/2023 0943    Gender - Female 0=No filed at 12/09/2024 0837 --   Apfel Simplified Score Calculator 1 filed at 12/09/2024 0837 2 filed at 10/12/2023 0943          Risk Analysis Index Results This Encounter    No data found in the last 10 encounters.       Stop Bang Score      Flowsheet Row Pre-Admission Testing from 12/9/2024 in [facility] Clinical Support from 10/12/2023 in [facility]   Do you snore loudly? 0 filed at 12/09/2024 0753 1 filed at 10/12/2023 0918   Do you often feel tired or fatigued after your sleep? 0 filed at 12/09/2024 0753 0 filed at 10/12/2023 0918   Has anyone ever observed you stop breathing in your sleep? 0 filed at 12/09/2024 0753 0 filed at 10/12/2023 0918   Do you have or are you being treated for high blood pressure? 0 filed at 12/09/2024 0753 0 filed at 10/12/2023 0918   Recent BMI (Calculated) 28.8 filed at 12/09/2024 0753 28.1 filed at 10/12/2023 0918   Is BMI greater than 35 kg/m2? 0=No filed at 12/09/2024 0753 0=No filed at 10/12/2023 0918   Age older than 50 years old? 0=No filed at 12/09/2024 0753 0=No filed at 10/12/2023 0918   Is your neck circumference greater than 17 inches (Male) or 16 inches (Female)? 0 filed at 12/09/2024 0753 --   Gender - Male 1=Yes filed at 12/09/2024 0753 --   STOP-BANG Total Score 1 filed at 12/09/2024 0753 --          Prodigy: High Risk  Total Score: 8              Prodigy Gender Score          ARISCAT Score for Postoperative Pulmonary Complications      Flowsheet Row Pre-Admission Testing from 12/9/2024 in [facility]   Age, years  0 filed at 12/09/2024 1315   Preoperative SpO2 8 filed at 12/09/2024 1315   Respiratory infection in the last month Either upper or lower (i.e., URI, bronchitis, pneumonia), with fever and antibiotic treatment 0 filed at 12/09/2024 1315   Preoperative anemoa (Hgb less than 10 g/dl) 0 filed at 12/09/2024 1315   Surgical incision  0 filed at 12/09/2024 1315   Duration of surgery  16 filed  at 12/09/2024 1315   Emergency Procedure  0 filed at 12/09/2024 1315   ARISCAT Total Score  24 filed at 12/09/2024 1315          Brayan Perioperative Risk for Myocardial Infarction or Cardiac Arrest (BJ)      Flowsheet Row Pre-Admission Testing from 12/9/2024 in [facility]   Age 0.46 filed at 12/09/2024 0843   Functional Status  0.65 filed at 12/09/2024 0843   ASA Class  -3.29 filed at 12/09/2024 0843   Creatinine 0 filed at 12/09/2024 0843   Type of Procedure  -0.26 filed at 12/09/2024 0843   BJ Total Score  -7.71 filed at 12/09/2024 0843   BJ % 0.04 filed at 12/09/2024 0843          Results for orders placed or performed in visit on 12/09/24 (from the past 24 hours)   Basic Metabolic Panel   Result Value Ref Range    Glucose 76 74 - 99 mg/dL    Sodium 139 136 - 145 mmol/L    Potassium 5.1 3.5 - 5.3 mmol/L    Chloride 104 98 - 107 mmol/L    Bicarbonate 25 21 - 32 mmol/L    Anion Gap 15 10 - 20 mmol/L    Urea Nitrogen 11 6 - 23 mg/dL    Creatinine 0.44 (L) 0.50 - 1.30 mg/dL    eGFR >90 >60 mL/min/1.73m*2    Calcium 9.1 8.6 - 10.6 mg/dL     *Note: Due to a large number of results and/or encounters for the requested time period, some results have not been displayed. A complete set of results can be found in Results Review.        Assessment and Plan:  24 y.o.  male  scheduled for Botox for neurogenic bladder with Dr. Silva on 12/12. PMHx significant for C5-C7 incomplete quadriplegia d/t intrauterine hemorrhage with resultant spasticity s/p baclofen pump placement, chronic UTIs and multiple episosdes of sepsis 2/2 to UTI. Hx also includes chronic reactive airway disease and respiratory insufficiency. Presents to CPM today for perioperative risk stratification and optimization    Neuro:  Hx includes spinal cord injury- C5-C7 incomplete quadriplegia d/t intrauterine hemorrhage with resultant spasticity s/p baclofen pump placement. No injury since including stroke or seizures.   Patient is at  "increased risk for perioperative CVA secondary to  previous CVA/TIA. He's had multiple anesthetic procedures across his lifetime with no perioperative neurological issues. Per mother, wakes up well  usually from anesthesia.     HEENT:  No HEENT diagnosis or significant findings on chart review or clinical presentation and evaluation. No further preoperative testing/intervention indicated at this time.    Cardiovascular:  No significant CV history. Follows with Cardiology regularly. (Do not see notes in record)   No further preoperative testing or intervention is indicated at this time.  METS: 5.1  RCRI: 1 point, 6.0% risk for postoperative MACE   BJ: .04% risk for 30 day postoperative MACE  EKG - sinus tachycardia (last EKG)    Pulmonary:  Hx significant for chronic respiratory insufficiency and reactive airway disease. Per mother, history of multiple anesthetic procedures without any mention of respiratory complications intraop or post-op.   Per Pulm note 2/24: \"This is a 24 yo with complicated medical history including underlying myelomalacia with hemiparesis, muscular weakness, restrictive lung disease, reactive airway disease/asthma, and history of respiratory insufficiency with BiPap supplementation at night, when he is ill - none in MANY years. Also remote history of tracheostomy; he was decannulated in July 2005. He has hx peritonitis with associated pleural effusions. FVC is lower %62. FEV1 is stable around 70%. impaired airway clearance - would recommend something to use when sick - continue acapella PRN\"  Stop Bang score is 1 placing patient at low risk for SETH  ARISCAT: <26 points, 1.6% risk of in-hospital postoperative pulmonary complication  PRODIGY: Low risk for opioid induced respiratory depression  Non-smoker     Renal:   No renal diagnosis or significant findings on chart review, clinical presentation or evaluation. No further preoperative testing/intervention is indicated at this " time.    Endocrine:  No endocrine diagnosis or significant findings on chart review or clinical presentation and evaluation. No further testing or intervention is indicated at this time.    Hematologic:  No significant hematologic history.   Caprini Score 2, patient at Low risk for perioperative DVT.  Patient provided with VTE education/handout.    Gastrointestinal:   Hx of mild GERD and is s/p Nissen with no active GI issues/complaints.   Eat-10 score 0  Apfel 1    Infectious disease:   Hx chronic UTIs and sepsis 2/2 UTIs. Last septic admission as about a year ago. Patient denies current dysuria or abdominal pain. Does have some frequency (normal for him). Patient can typical tell when UTI is brewing. Per mother, UA is almost always positive.     Musculoskeletal:   No diagnosis or significant findings on chart review or clinical presentation and evaluation.     Anesthesia/Airway:  No anesthesia complications    Medication instructions and NPO guidelines reviewed with the patient.  All questions or concerns discussed and addressed.    -Will obtain BMP    Patient seen and staffed with Dr. Shira Siu MD   Anesthesiology, CA-2

## 2024-12-09 NOTE — H&P (VIEW-ONLY)
CPM/PAT Evaluation       Name: Jose Fernandez (Jose Fernandez)  /Age: 2000/24 y.o.     In-Person       Chief Complaint: Botox for neurogenic bladder with Dr. Silva on     HPI  Mr. Fernandez is a 23 y/o male with PMHx significnat for C5-C7 incomplete quadriplegia d/t intrauterine hemorrhage with resultant spasticity s/p baclofen pump placement and neurogenic bladder with chronic UTIs and multiple episosdes of sepsis 2/2 to UIT (last event was about a year ago). There is hx of reactive airway disease and respirtory insufficiency-->s/p trache and multiple intubations/icu admission for complications however this was over 20 years ago. Per mother who is extremely involved and knowledgeable on pt history, these respiratory complications were over 20 years ago, he sees pulm regularly (last visit 6 months ago) and he's had multiple anesthesia procedures since with no perioperative respiratory issues. She reports he does very well with anesthesia.     No hx of MI, palpitations, chest pain, CVA, DT/PE or diabetes. Per mother, he sees cardiology regularly and has never had any problems.       Past Medical History:   Diagnosis Date    Abnormal results of pulmonary function studies 2019    Abnormal PFT    Acute infarction of spinal cord (embolic) (nonembolic) (Multi) 2020    intrauterine hemorrhage and subsequent cord infarction    Asthma     last seen by Lyric Cameron 2023    GERD (gastroesophageal reflux disease)     s/p Nissen    Hemiplegia (Multi)     History of transfusion     Immunocompromised     Intra-abdominal abscess (Multi)     recurrent    Mitrofanoff appendicovesicostomy present (Multi)     MSSA (methicillin susceptible Staphylococcus aureus)     MSSA R gluteal abscess 2023    Neurogenic bladder     Neurogenic bowel     Other conditions influencing health status 2013    Drug-induced Myelopathy    Other conditions influencing health status 2022    Abscess, abdomen     Pectus excavatum 07/21/2013    Pectus excavatum    Peritoneal abscess (Multi) 05/24/2021    Peritonitis with abscess of intestine    Personal history of diseases of the skin and subcutaneous tissue 05/17/2019    History of dermatitis    Personal history of diseases of the skin and subcutaneous tissue     History of eczema    Personal history of other diseases of the digestive system 04/13/2020    History of chronic constipation    Personal history of other infectious and parasitic diseases 07/24/2019    History of onychomycosis    Personal history of other infectious and parasitic diseases 09/24/2018    History of wound infection    Recurrent UTI (urinary tract infection)     s/p Mitrofanoff    Restrictive lung disease     Scoliosis, unspecified     Kyphoscoliosis    Snoring     Snoring    Tinea pedis 07/23/2019    Tinea pedis of both feet    Urinary tract infection     UTI (urinary tract infection)     current    Xerosis cutis 07/23/2019    Xerosis of skin       Past Surgical History:   Procedure Laterality Date    ACHILLES TENDON SURGERY  10/30/2013    Subcutaneous Tenotomy Achilles Tendon Under Gen Anesthesia    BACLOFEN PUMP IMPLANTATION  2019    BRONCHOSCOPY      CYSTOSCOPY      GASTROSTOMY  10/30/2013    Gastric Surgery Feeding Gastrostomy s/p removal    MITROFANOFF PROCEDURE  08/06/2018    NISSEN FUNDOPLICATION      2003    OTHER SURGICAL HISTORY  10/30/2013    Direct Laryngoscopy    OTHER SURGICAL HISTORY  10/30/2013    Closed Treatment Fx Of Proximal Femoral Neck W/ Manipulation    OTHER SURGICAL HISTORY  04/16/2015    Complex Repair Of Wound Trunk 2.6 To 7.5 Cm    OTHER SURGICAL HISTORY  10/14/2022    Circumcision    SPINAL FUSION      x2 2015, 2017    TRACHEOSTOMY TUBE PLACEMENT      s/p decannulation 2005    US GUIDED PERCUTANEOUS PERITONEAL OR RETROPERITONEAL FLUID COLLECTION DRAINAGE  04/08/2021    US GUIDED PERCUTANEOUS PERITONEAL OR RETROPERITONEAL FLUID COLLECTION DRAINAGE 4/8/2021 [facility]     Social hx: Pt works at [employer]; denies any smoking, alcohol or drug use.     Family History   Problem Relation Name Age of Onset    Diabetes Maternal Grandfather         Allergies   Allergen Reactions    Ciprofloxacin Anaphylaxis    Sulfamethoxazole-Trimethoprim Anaphylaxis    Sutures Other     Patients mother states patient reacts to dissolveable suture     Fish Containing Products Hives    Other Other    Shellfish Containing Products Hives    Vancomycin Other     Red Man's syndrome        Prior to Admission medications    Medication Sig Start Date End Date Taking? Authorizing Provider   cholecalciferol (Vitamin D-3) 50 mcg (2,000 unit) capsule TAKE 1 CAPSULE Daily 6/17/22  Yes Historical Provider, MD   cyanocobalamin (Vitamin B-12) 1,000 mcg tablet TAKE 1 TABLET DAILY AS DIRECTED. 6/17/22  Yes Historical Provider, MD   docusate sodium (Colace) 100 mg capsule Take 1 capsule (100 mg) by mouth once daily. 1/8/24 1/7/25 Yes PATRICK Weir   famotidine (Pepcid) 40 mg tablet Take 1 tablet (40 mg) by mouth 2 times a day. 1/8/24  Yes PATRICK Weir   ferrous sulfate ER (Slow Fe) 142 mg ER tablet Take 1 tablet by mouth once daily. 2/5/19  Yes Historical Provider, MD   L. ACIDOPHILUS/BIFID. ANIMALIS ORAL Take 1 capsule by mouth once daily.   Yes Historical Provider, MD   mirabegron (Myrbetriq) 50 mg tablet extended release 24 hr 24 hr tablet Take 1 tablet (50 mg) by mouth once daily. 9/3/24  Yes Adam Vargas MD   montelukast (Singulair) 10 mg tablet Take 1 tablet (10 mg) by mouth once daily. 7/24/24 12/9/24 Yes PATRICK Youssef   oxybutynin XL (Ditropan-XL) 15 mg 24 hr tablet Take 1 tablet (15 mg) by mouth 2 times a day. 10/9/24  Yes Adam Vargas MD   polyethylene glycol (Glycolax, Miralax) 17 gram/dose powder Take 17 g by mouth once daily. MIX IN 8 OUNCES OF WATER, JUICE, OR TEA AND DRINK DAILY. 1/8/24 1/7/25 Yes PATRICK Weir    albuterol 2.5 mg /3 mL (0.083 %) nebulizer solution Take 3 mL (2.5 mg) by nebulization every 4 hours if needed for wheezing. Inhale one vial every 4-6 hours as needed for cough, wheezing and shortness of breath 7/24/24 8/23/24  Griselda M IRINA Shea-CNP   budesonide-formoteroL (Symbicort) 160-4.5 mcg/actuation inhaler Inhale 2 puffs 2 times a day. Rinse mouth with water after use to reduce aftertaste and incidence of candidiasis. Do not swallow. 7/24/24 8/23/24  Griselda LU IRINA Shea-CNP   mometasone (Asmanex Twisthaler) 220 mcg/ actuation (14) inhaler Inhale 2 puffs once daily as needed. Rinse mouth with water after use to reduce aftertaste and incidence of candidiasis. Do not swallow.  Patient not taking: Reported on 12/9/2024    Historical Provider, MD MORAN ROS:   Constitutional:   neg    Neuro/Psych:   neg    Eyes:   neg    Ears:   neg    Nose:   neg    Mouth:   neg    Throat:   neg    Neck:   neg    Cardio:   neg    Respiratory:   neg    Endocrine:   neg    GI:   neg    :    Some urinary frequency   Musculoskeletal:   Hematologic:   neg    Skin:  neg        Physical Exam  Constitutional:       Appearance: Normal appearance.      Comments: In motorized chair   HENT:      Head: Normocephalic and atraumatic.      Mouth/Throat:      Mouth: Mucous membranes are moist.   Cardiovascular:      Rate and Rhythm: Normal rate and regular rhythm.   Pulmonary:      Effort: Pulmonary effort is normal.      Breath sounds: Normal breath sounds.   Skin:     General: Skin is warm and dry.   Neurological:      General: No focal deficit present.      Mental Status: He is alert and oriented to person, place, and time.   Psychiatric:         Mood and Affect: Mood normal.         Behavior: Behavior normal.         Thought Content: Thought content normal.         Judgment: Judgment normal.      Comments: Very pleasant, conversant and answers all questions appropriately. No deficits apart from baseline LE weakness.        "    PAT AIRWAY:   Airway:     Mallampati::  I    TM distance::  >3 FB    Neck ROM::  Full  normal        Testing/Diagnostic: Cannot view Cardiology work-up     Patient Specialist/PCP: sees multiple specialists    Visit Vitals  /80   Pulse 83   Temp 37.2 °C (98.9 °F) (Temporal)   Ht 1.753 m (5' 9\")   Wt 90.7 kg (200 lb)   SpO2 95%   BMI 29.53 kg/m²   Smoking Status Never   BSA 2.1 m²       DASI Risk Score      Flowsheet Row Pre-Admission Testing from 12/9/2024 in [facility] Clinical Support from 10/12/2023 in [facility]   Can you take care of yourself (eat, dress, bathe, or use toilet)?  2.75 filed at 12/09/2024 0754 2.75 filed at 10/12/2023 0918   Can you walk indoors, such as around your house? 0 filed at 12/09/2024 0754 1.75 filed at 10/12/2023 0918   Can you walk a block or two on level ground?  0 filed at 12/09/2024 0754 2.75 filed at 10/12/2023 0918   Can you climb a flight of stairs or walk up a hill? 0 filed at 12/09/2024 0754 5.5 filed at 10/12/2023 0918   Can you run a short distance? 0 filed at 12/09/2024 0754 8 filed at 10/12/2023 0918   Can you do light work around the house like dusting or washing dishes? 2.7 filed at 12/09/2024 0754 0 filed at 10/12/2023 0918   Can you do moderate work around the house like vacuuming, sweeping floors or carrying groceries? 0 filed at 12/09/2024 0754 0 filed at 10/12/2023 0918   Can you do heavy work around the house like scrubbing floors or lifting and moving heavy furniture?  0 filed at 12/09/2024 0754 0 filed at 10/12/2023 0918   Can you do yard work like raking leaves, weeding or pushing a mower? 0 filed at 12/09/2024 0754 0 filed at 10/12/2023 0918   Can you have sexual relations? 0 filed at 12/09/2024 0754 5.25 filed at 10/12/2023 0918   Can you participate in moderate recreational activities like golf, bowling, dancing, doubles tennis or throwing a baseball or football? 6 filed at 12/09/2024 0754 6 filed at 10/12/2023 0918 "   Can you participate in strenous sports like swimming, singles tennis, football, basketball, or skiing? 7.5 filed at 12/09/2024 0754 7.5 filed at 10/12/2023 0918   DASI SCORE 18.95 filed at 12/09/2024 0754 39.5 filed at 10/12/2023 0918   METS Score (Will be calculated only when all the questions are answered) 5.1 filed at 12/09/2024 0754 7.6 filed at 10/12/2023 0918          Caprini DVT Assessment      Flowsheet Row Pre-Admission Testing from 12/9/2024 in [facility] Clinical Support from 10/12/2023 in [facility]   DVT Score 2 filed at 12/09/2024 0838 4 filed at 10/12/2023 0934   Surgical Factors Minor surgery planned filed at 12/09/2024 0838 --   BMI 30 or less filed at 12/09/2024 0838 30 or less filed at 10/12/2023 0934   RETIRED: Current Status -- Major surgery planned, lasting 2-3 hours filed at 10/12/2023 0934   RETIRED: History -- Prior major surgery filed at 10/12/2023 0934   RETIRED: Age -- Less than 40 years filed at 10/12/2023 0934          Modified Frailty Index      Flowsheet Row Clinical Support from 10/12/2023 in [facility]   Non-independent functional status (problems with dressing, bathing, personal grooming, or cooking) 0.0909 filed at 10/12/2023 0941   History of diabetes mellitus  0 filed at 10/12/2023 0943   History of COPD 0 filed at 10/12/2023 0943   History of CHF No filed at 10/12/2023 0943   History of MI 0 filed at 10/12/2023 0943   History of Percutaneous Coronary Intervention, Cardiac Surgery, or Angina No filed at 10/12/2023 0943   Hypertension requiring the use of medication  0 filed at 10/12/2023 0943   Peripheral vascular disease 0 filed at 10/12/2023 0943   Impaired sensorium (cognitive impairement or loss, clouding, or delirium) 0 filed at 10/12/2023 0943   TIA or CVA withouy residual deficit 0 filed at 10/12/2023 0943   Cerebrovascular accident with deficit 0.0909 filed at 10/12/2023 0943   Modified Frailty Index Calculator .0909  filed at 10/12/2023 0941          CHADS2 Stroke Risk         N/A 3 to 100%: High Risk   2 to < 3%: Medium Risk   0 to < 2%: Low Risk     Last Change: N/A          This score determines the patient's risk of having a stroke if the patient has atrial fibrillation.        This score is not applicable to this patient. Components are not calculated.          Revised Cardiac Risk Index      Flowsheet Row Pre-Admission Testing from 12/9/2024 in [facility] [place] Clinical Support from 10/12/2023 in [facility] [place]   High-Risk Surgery (Intraperitoneal, Intrathoracic,Suprainguinal vascular) 0 filed at 12/09/2024 0843 0 filed at 10/12/2023 0941   History of ischemic heart disease (History of MI, History of positive exercuse test, Current chest paint considered due to myocardial ischemia, Use of nitrate therapy, ECG with pathological Q Waves) 0 filed at 12/09/2024 0843 0 filed at 10/12/2023 0941   History of congestive heart failure (pulmonary edemia, bilateral rales or S3 gallop, Paroxysmal nocturnal dyspnea, CXR showing pulmonary vascular redistribution) 0 filed at 12/09/2024 0843 0 filed at 10/12/2023 0941   History of cerebrovascular disease (Prior TIA or stroke) 1 filed at 12/09/2024 0843 0 filed at 10/12/2023 0941   Pre-operative insulin treatment 0 filed at 12/09/2024 0843 0 filed at 10/12/2023 0941   Pre-operative creatinine>2 mg/dl 0 filed at 12/09/2024 0843 0 filed at 10/12/2023 0941   Revised Cardiac Risk Calculator 1 filed at 12/09/2024 0843 0 filed at 10/12/2023 0941          Apfel Simplified Score      Flowsheet Row Pre-Admission Testing from 12/9/2024 in [facility] [place] Clinical Support from 10/12/2023 in [facility] [place]   Smoking status 1 filed at 12/09/2024 0837 1 filed at 10/12/2023 0943   History of motion sickness or PONV  0 filed at 12/09/2024 0837 0 filed at 10/12/2023 0943   Use of postoperative opioids 0 filed at 12/09/2024 0837 1 filed at 10/12/2023 0943    Gender - Female 0=No filed at 12/09/2024 0837 --   Apfel Simplified Score Calculator 1 filed at 12/09/2024 0837 2 filed at 10/12/2023 0943          Risk Analysis Index Results This Encounter    No data found in the last 10 encounters.       Stop Bang Score      Flowsheet Row Pre-Admission Testing from 12/9/2024 in [facility] Clinical Support from 10/12/2023 in [facility]   Do you snore loudly? 0 filed at 12/09/2024 0753 1 filed at 10/12/2023 0918   Do you often feel tired or fatigued after your sleep? 0 filed at 12/09/2024 0753 0 filed at 10/12/2023 0918   Has anyone ever observed you stop breathing in your sleep? 0 filed at 12/09/2024 0753 0 filed at 10/12/2023 0918   Do you have or are you being treated for high blood pressure? 0 filed at 12/09/2024 0753 0 filed at 10/12/2023 0918   Recent BMI (Calculated) 28.8 filed at 12/09/2024 0753 28.1 filed at 10/12/2023 0918   Is BMI greater than 35 kg/m2? 0=No filed at 12/09/2024 0753 0=No filed at 10/12/2023 0918   Age older than 50 years old? 0=No filed at 12/09/2024 0753 0=No filed at 10/12/2023 0918   Is your neck circumference greater than 17 inches (Male) or 16 inches (Female)? 0 filed at 12/09/2024 0753 --   Gender - Male 1=Yes filed at 12/09/2024 0753 --   STOP-BANG Total Score 1 filed at 12/09/2024 0753 --          Prodigy: High Risk  Total Score: 8              Prodigy Gender Score          ARISCAT Score for Postoperative Pulmonary Complications      Flowsheet Row Pre-Admission Testing from 12/9/2024 in [facility]   Age, years  0 filed at 12/09/2024 1315   Preoperative SpO2 8 filed at 12/09/2024 1315   Respiratory infection in the last month Either upper or lower (i.e., URI, bronchitis, pneumonia), with fever and antibiotic treatment 0 filed at 12/09/2024 1315   Preoperative anemoa (Hgb less than 10 g/dl) 0 filed at 12/09/2024 1315   Surgical incision  0 filed at 12/09/2024 1315   Duration of surgery  16 filed  at 12/09/2024 1315   Emergency Procedure  0 filed at 12/09/2024 1315   ARISCAT Total Score  24 filed at 12/09/2024 1315          Brayan Perioperative Risk for Myocardial Infarction or Cardiac Arrest (BJ)      Flowsheet Row Pre-Admission Testing from 12/9/2024 in [facility]   Age 0.46 filed at 12/09/2024 0843   Functional Status  0.65 filed at 12/09/2024 0843   ASA Class  -3.29 filed at 12/09/2024 0843   Creatinine 0 filed at 12/09/2024 0843   Type of Procedure  -0.26 filed at 12/09/2024 0843   BJ Total Score  -7.71 filed at 12/09/2024 0843   BJ % 0.04 filed at 12/09/2024 0843          Results for orders placed or performed in visit on 12/09/24 (from the past 24 hours)   Basic Metabolic Panel   Result Value Ref Range    Glucose 76 74 - 99 mg/dL    Sodium 139 136 - 145 mmol/L    Potassium 5.1 3.5 - 5.3 mmol/L    Chloride 104 98 - 107 mmol/L    Bicarbonate 25 21 - 32 mmol/L    Anion Gap 15 10 - 20 mmol/L    Urea Nitrogen 11 6 - 23 mg/dL    Creatinine 0.44 (L) 0.50 - 1.30 mg/dL    eGFR >90 >60 mL/min/1.73m*2    Calcium 9.1 8.6 - 10.6 mg/dL     *Note: Due to a large number of results and/or encounters for the requested time period, some results have not been displayed. A complete set of results can be found in Results Review.        Assessment and Plan:  24 y.o.  male  scheduled for Botox for neurogenic bladder with Dr. Silva on 12/12. PMHx significant for C5-C7 incomplete quadriplegia d/t intrauterine hemorrhage with resultant spasticity s/p baclofen pump placement, chronic UTIs and multiple episosdes of sepsis 2/2 to UTI. Hx also includes chronic reactive airway disease and respiratory insufficiency. Presents to CPM today for perioperative risk stratification and optimization    Neuro:  Hx includes spinal cord injury- C5-C7 incomplete quadriplegia d/t intrauterine hemorrhage with resultant spasticity s/p baclofen pump placement. No injury since including stroke or seizures.   Patient is at  "increased risk for perioperative CVA secondary to  previous CVA/TIA. He's had multiple anesthetic procedures across his lifetime with no perioperative neurological issues. Per mother, wakes up well  usually from anesthesia.     HEENT:  No HEENT diagnosis or significant findings on chart review or clinical presentation and evaluation. No further preoperative testing/intervention indicated at this time.    Cardiovascular:  No significant CV history. Follows with Cardiology regularly. (Do not see notes in record)   No further preoperative testing or intervention is indicated at this time.  METS: 5.1  RCRI: 1 point, 6.0% risk for postoperative MACE   BJ: .04% risk for 30 day postoperative MACE  EKG - sinus tachycardia (last EKG)    Pulmonary:  Hx significant for chronic respiratory insufficiency and reactive airway disease. Per mother, history of multiple anesthetic procedures without any mention of respiratory complications intraop or post-op.   Per Pulm note 2/24: \"This is a 24 yo with complicated medical history including underlying myelomalacia with hemiparesis, muscular weakness, restrictive lung disease, reactive airway disease/asthma, and history of respiratory insufficiency with BiPap supplementation at night, when he is ill - none in MANY years. Also remote history of tracheostomy; he was decannulated in July 2005. He has hx peritonitis with associated pleural effusions. FVC is lower %62. FEV1 is stable around 70%. impaired airway clearance - would recommend something to use when sick - continue acapella PRN\"  Stop Bang score is 1 placing patient at low risk for SETH  ARISCAT: <26 points, 1.6% risk of in-hospital postoperative pulmonary complication  PRODIGY: Low risk for opioid induced respiratory depression  Non-smoker     Renal:   No renal diagnosis or significant findings on chart review, clinical presentation or evaluation. No further preoperative testing/intervention is indicated at this " time.    Endocrine:  No endocrine diagnosis or significant findings on chart review or clinical presentation and evaluation. No further testing or intervention is indicated at this time.    Hematologic:  No significant hematologic history.   Caprini Score 2, patient at Low risk for perioperative DVT.  Patient provided with VTE education/handout.    Gastrointestinal:   Hx of mild GERD and is s/p Nissen with no active GI issues/complaints.   Eat-10 score 0  Apfel 1    Infectious disease:   Hx chronic UTIs and sepsis 2/2 UTIs. Last septic admission as about a year ago. Patient denies current dysuria or abdominal pain. Does have some frequency (normal for him). Patient can typical tell when UTI is brewing. Per mother, UA is almost always positive.     Musculoskeletal:   No diagnosis or significant findings on chart review or clinical presentation and evaluation.     Anesthesia/Airway:  No anesthesia complications    Medication instructions and NPO guidelines reviewed with the patient.  All questions or concerns discussed and addressed.    -Will obtain BMP    Patient seen and staffed with Dr. Shira Siu MD   Anesthesiology, CA-2

## 2024-12-09 NOTE — PREPROCEDURE INSTRUCTIONS
Medication List            Accurate as of December 9, 2024  8:21 AM. Always use your most recent med list.                albuterol 2.5 mg /3 mL (0.083 %) nebulizer solution  Take 3 mL (2.5 mg) by nebulization every 4 hours if needed for wheezing. Inhale one vial every 4-6 hours as needed for cough, wheezing and shortness of breath  Medication Adjustments for Surgery: Take/Use as prescribed     Asmanex Twisthaler 220 mcg/ actuation (14) inhaler  Generic drug: mometasone  Medication Adjustments for Surgery: Take/Use as prescribed     budesonide-formoteroL 160-4.5 mcg/actuation inhaler  Commonly known as: Symbicort  Inhale 2 puffs 2 times a day. Rinse mouth with water after use to reduce aftertaste and incidence of candidiasis. Do not swallow.  Medication Adjustments for Surgery: Take/Use as prescribed     cholecalciferol 50 mcg (2,000 unit) capsule  Commonly known as: Vitamin D-3  Additional Medication Adjustments for Surgery: Take last dose 7 days before surgery     cyanocobalamin 1,000 mcg tablet  Commonly known as: Vitamin B-12  Additional Medication Adjustments for Surgery: Take last dose 7 days before surgery     docusate sodium 100 mg capsule  Commonly known as: Colace  Take 1 capsule (100 mg) by mouth once daily.  Medication Adjustments for Surgery: Take/Use as prescribed     famotidine 40 mg tablet  Commonly known as: Pepcid  Take 1 tablet (40 mg) by mouth 2 times a day.  Medication Adjustments for Surgery: Take/Use as prescribed     L. ACIDOPHILUS/BIFID. ANIMALIS ORAL  Medication Adjustments for Surgery: Take/Use as prescribed     mirabegron 50 mg tablet extended release 24 hr 24 hr tablet  Commonly known as: Myrbetriq  Take 1 tablet (50 mg) by mouth once daily.  Medication Adjustments for Surgery: Take/Use as prescribed     montelukast 10 mg tablet  Commonly known as: Singulair  Take 1 tablet (10 mg) by mouth once daily.  Medication Adjustments for Surgery: Take/Use as prescribed     oxybutynin XL 15 mg  24 hr tablet  Commonly known as: Ditropan-XL  Take 1 tablet (15 mg) by mouth 2 times a day.  Medication Adjustments for Surgery: Take/Use as prescribed     polyethylene glycol 17 gram/dose powder  Commonly known as: Glycolax, Miralax  Take 17 g by mouth once daily. MIX IN 8 OUNCES OF WATER, JUICE, OR TEA AND DRINK DAILY.  Medication Adjustments for Surgery: Take/Use as prescribed     Slow Fe 142 mg (45 mg iron) ER tablet  Generic drug: ferrous sulfate ER  Additional Medication Adjustments for Surgery: Take last dose 7 days before surgery                            Thank you for visiting [facility] ([facility]) today for your pre-procedure evaluation, you were seen by Dr. Siu [phone]    This summary includes instructions and information to aid you during your perioperative period.  Please read carefully. If you have any questions about your visit today, please call the number listed above.  If you become ill or have any changes to your health before your surgery, please contact your primary care provider and alert your surgeon.    Preparing for your Surgery       Exercises  Preoperative Deep Breathing Exercises  Why it is important to do deep breathing exercises before my surgery?  Deep breathing exercises strengthen your breathing muscles.  This helps you to recover after your surgery and decreases the chance of breathing complications.  How are the deep breathing exercises done?  Sit straight with your back supported.  Breathe in deeply and slowly through your nose. Your lower rib cage should expand and your abdomen may move forward.  Hold that breath for 3 to 5 seconds.  Breathe out through pursed lips, slowly and completely.  Rest and repeat 10 times every hour while awake.  Rest longer if you become dizzy or lightheaded.       Incentive Spirometer   You were provided with an incentive spirometer in [facility], please follow the below instructions.   You were not provided an incentive  spirometer in [facility], please disregard the incentive spirometer instructions  What is an incentive spirometer?  An incentive spirometer is a device used before and after surgery to “exercise” your lungs.  It helps you to take deeper breaths to expand your lungs.  Below is an example of a basic incentive spirometer.  The device you receive may differ slightly but they all function the same.    Why do I need to use an incentive spirometer?  Using your incentive spirometer prepares your lungs for surgery and helps prevent lung problems after surgery.  How do I use my incentive spirometer?  When you're using your incentive spirometer, make sure to breathe through your mouth. If you breathe through your nose, the incentive spirometer won't work properly. You can hold your nose if you have trouble.  If you feel dizzy at any time, stop and rest. Try again at a later time.  Follow the steps below:  Set up your incentive spirometer, expand the flexible tubing and connect to the outlet.  Sit upright in a chair or bed. Hold the incentive spirometer at eye level.   Put the mouthpiece in your mouth and close your lips tightly around it. Slowly breathe out (exhale) completely.  Breathe in (inhale) slowly through your mouth as deeply as you can. As you take a breath, you will see the piston rise inside the large column. While the piston rises, the indicator should move upwards. It should stay in between the 2 arrows (see Figure).  Try to get the piston as high as you can, while keeping the indicator between the arrows.   If the indicator doesn't stay between the arrows, you're breathing either too fast or too slow.  When you get it as high as you can, hold your breath for 10 seconds, or as long as possible. While you're holding your breath, the piston will slowly fall to the base of the spirometer.  Once the piston reaches the bottom of the spirometer, breathe out slowly through your mouth. Rest for a few seconds.  Repeat 10 times.  Try to get the piston to the same level with each breath.  Repeat every hour while awake  You can carefully clean the outside of the mouthpiece with an alcohol wipe or soap and water.      Preoperative Brain Exercises    What are brain exercises?  A brain exercise is any activity that engages your thinking (cognitive) skills.    What types of activities are considered brain exercises?  Jigsaw puzzles, crossword puzzles, word jumble, memory games, word search, and many more.  Many can be found free online or on your phone via a mobile lachelle.    Why should I do brain exercises before my surgery?  More recent research has shown brain exercise before surgery can lower the risk of postoperative delirium (confusion) which can be especially important for older adults.  Patients who did brain exercises for 5 to 10 hours the days before surgery, cut their risk of postoperative delirium in half up to 1 week after surgery.    Sit-to-Stand Exercise    What is the sit-to-stand exercise?  The sit-to-stand exercise strengthens the muscles of your lower body and muscles in the center of your body (core muscles for stability) helping to maintain and improve your strength and mobility.  How do I do the sit-to-stand exercise?  The goal is to do this exercise without using your arms or hands.  If this is too difficult, use your arms and hands or a chair with armrests to help slowly push yourself to the standing position and lower yourself back to the sitting position. As the movement becomes easier use your arms and hands less.    Steps to the sit-to-stand exercise  Sit up tall in a sturdy chair, knees bent, feet flat on the floor shoulder-width apart.  Shift your hips/pelvis forward in the chair to correctly position yourself for the next movement.  Lean forward at your hips.  Stand up straight putting equal weight on both feet.  Check to be sure you are properly aligned with the chair, in a slow controlled movement sit back  down.  Repeat this exercise 10-15 times.  If needed you can do it fewer times until your strength improves.  Rest for 1 minute.  Do another 10-15 sit-to-stand exercises.  Try to do this in the morning and evening.        Instructions    Preoperative Fasting Guidelines    Why must I stop eating and drinking near surgery time?  With sedation, food or liquid in your stomach can enter your lungs causing serious complications  Food can increase nausea and vomiting  When do I need to stop eating and drinking before my surgery?      Do not eat any food after midnight the night before your surgery/procedure. You may have up to 13.5 ounces of clear liquid until TWO hours before your instructed arrival time to the hospital.  This includes water, black tea/coffee, (no milk or cream) apple juice, and electrolyte drinks (Gatorade). You may chew gum until TWO hours before your surgery/procedure            Simple things you can do to help prevent blood clots     Blood clots are blockages that can form in the body's veins. When a blood clot forms in your deep veins, it may be called a deep vein thrombosis, or DVT for short. Blood clots can happen in any part of the body where blood flows, but they are most common in the arms and legs. If a piece of a blood clot breaks free and travels to the lungs, it is called a pulmonary embolus (PE). A PE can be a very serious problem.         Being in the hospital or having surgery can raise your chances of getting a blood clot because you may not be well enough to move around as much as you normally do.         Ways you can help prevent blood clots in the hospital       Wearing SCDs  SCDs stands for Sequential Compression Devices.   SCDs are special sleeves that wrap around your legs. They attach to a pump that fills them with air to gently squeeze your legs every few minutes.  This helps return the blood in your legs to your heart.   SCDs should only be taken off when walking or bathing. SCDs  may not be comfortable, but they can help save your life.              Pump SCD leg sleeves  Wearing compression stockings - if your doctor orders them. These special snug-fitting stockings gently squeeze your legs to help blood flow.       Walking. Walking helps move the blood in your legs.   If your doctor says it is ok, try walking the halls at least   5 times a day. Ask us to help you get up, so you don't fall.      Taking any blood-thinning medicines your doctor orders.              Ways you can help prevent blood clots at home         Wearing compression stockings - if your doctor orders them.   Walking - to help move the blood in your legs.    Taking any blood-thinning medicines your doctor orders.      Signs of a blood clot or PE    Tell your doctor or nurse right away if you have any of the problems listed below.         If you are at home, seek medical care right away. Call 911 for chest pain or problems breathing.            Signs of a blood clot (DVT) - such as pain, swelling, redness, or warmth in your arm or legs.  Signs of a pulmonary embolism (PE) - such as chest pain or feeling short of breath      Tobacco and Alcohol;  Do not drink alcohol or smoke within 24 hours of surgery.  It is best to quit smoking for as long as possible before any surgery or procedure.        The Week before Surgery        Seven days before Surgery  Check your [facility] medication instructions  Do the exercises provided to you by [facility]   Arrange for a responsible, adult licensed  to take you home after surgery and stay with you for 24 hours.  You will not be permitted to drive yourself home if you have received any anesthetic/sedation  Six days before surgery  Check your [facility] medication instructions  Do the exercises provided to you by [facility]   Start using Chlorhexidene (CHG) body wash if prescribed  Five days before surgery  Check your [facility] medication instructions  Do the exercises provided to you by [facility]   Continue to use CHG body  wash if prescribed  Three days before surgery  Check your [facility] medication instructions  Do the exercises provided to you by [facility]   Continue to use CHG body wash if prescribed  Two days before surgery  Check your [facility] medication instructions  Do the exercises provided to you by [facility]   Continue to use CHG body wash if prescribed    The Day before Surgery       Check your [facility] medication and all other [facility] instructions including when to stop eating and drinking  You will be called with your arrival time for surgery in the late afternoon.  If you do not receive a call please reach out to your surgeon's office.  Do not smoke or drink 24 hours before surgery  Prepare items to bring with you to the hospital  Shower with your chlorhexidine wash if prescribed  Brush your teeth and use your chlorhexidine dental rinse if prescribed    The Day of Surgery       Check your [facility] medication instructions  Ensure you follow the instructions for when to stop eating and drinking  Shower, if prescribed use CHG.  Do not apply any lotions, creams, moisturizers, perfume or deodorant  Brush your teeth and use your CHG dental rinse if prescribed  Wear loose comfortable clothing  Avoid make-up  Remove  jewelry and piercings, consider professional piercing removal with a plastic spacer if needed  Bring photo ID and Insurance card  Bring an accurate medication list that includes medication dose, frequency and allergies  Bring a copy of your advanced directives (will, health care power of )  Bring any devices and controllers as well as medical devices you have been provided with for surgery (CPAP, slings, braces, etc.)  Dentures, eyeglasses, and contacts will be removed before surgery, please bring cases for contacts or glasses

## 2024-12-12 ENCOUNTER — ANESTHESIA (OUTPATIENT)
Dept: OPERATING ROOM | Facility: HOSPITAL | Age: 24
End: 2024-12-12
Payer: COMMERCIAL

## 2024-12-12 ENCOUNTER — HOSPITAL ENCOUNTER (OUTPATIENT)
Facility: HOSPITAL | Age: 24
Setting detail: OUTPATIENT SURGERY
Discharge: HOME | End: 2024-12-12
Attending: STUDENT IN AN ORGANIZED HEALTH CARE EDUCATION/TRAINING PROGRAM | Admitting: STUDENT IN AN ORGANIZED HEALTH CARE EDUCATION/TRAINING PROGRAM
Payer: COMMERCIAL

## 2024-12-12 VITALS
WEIGHT: 198.41 LBS | OXYGEN SATURATION: 95 % | TEMPERATURE: 97 F | HEIGHT: 69 IN | DIASTOLIC BLOOD PRESSURE: 57 MMHG | BODY MASS INDEX: 29.39 KG/M2 | SYSTOLIC BLOOD PRESSURE: 101 MMHG | RESPIRATION RATE: 12 BRPM | HEART RATE: 74 BPM

## 2024-12-12 DIAGNOSIS — N39.0 RECURRENT UTI: Primary | ICD-10-CM

## 2024-12-12 DIAGNOSIS — N31.9 NEUROGENIC BLADDER: ICD-10-CM

## 2024-12-12 LAB
ABO GROUP (TYPE) IN BLOOD: NORMAL
ANTIBODY SCREEN: NORMAL
RH FACTOR (ANTIGEN D): NORMAL

## 2024-12-12 PROCEDURE — 87086 URINE CULTURE/COLONY COUNT: CPT | Performed by: STUDENT IN AN ORGANIZED HEALTH CARE EDUCATION/TRAINING PROGRAM

## 2024-12-12 PROCEDURE — 3700000001 HC GENERAL ANESTHESIA TIME - INITIAL BASE CHARGE: Performed by: STUDENT IN AN ORGANIZED HEALTH CARE EDUCATION/TRAINING PROGRAM

## 2024-12-12 PROCEDURE — 36415 COLL VENOUS BLD VENIPUNCTURE: CPT

## 2024-12-12 PROCEDURE — 7100000001 HC RECOVERY ROOM TIME - INITIAL BASE CHARGE: Performed by: STUDENT IN AN ORGANIZED HEALTH CARE EDUCATION/TRAINING PROGRAM

## 2024-12-12 PROCEDURE — 3600000003 HC OR TIME - INITIAL BASE CHARGE - PROCEDURE LEVEL THREE: Performed by: STUDENT IN AN ORGANIZED HEALTH CARE EDUCATION/TRAINING PROGRAM

## 2024-12-12 PROCEDURE — A52287 PR CYSTOURETHROSCOPY INJ CHEMODENERVATION BLADDER: Performed by: ANESTHESIOLOGY

## 2024-12-12 PROCEDURE — 86901 BLOOD TYPING SEROLOGIC RH(D): CPT | Performed by: STUDENT IN AN ORGANIZED HEALTH CARE EDUCATION/TRAINING PROGRAM

## 2024-12-12 PROCEDURE — 86900 BLOOD TYPING SEROLOGIC ABO: CPT | Performed by: STUDENT IN AN ORGANIZED HEALTH CARE EDUCATION/TRAINING PROGRAM

## 2024-12-12 PROCEDURE — 2500000005 HC RX 250 GENERAL PHARMACY W/O HCPCS: Performed by: STUDENT IN AN ORGANIZED HEALTH CARE EDUCATION/TRAINING PROGRAM

## 2024-12-12 PROCEDURE — 2500000004 HC RX 250 GENERAL PHARMACY W/ HCPCS (ALT 636 FOR OP/ED): Mod: JZ | Performed by: STUDENT IN AN ORGANIZED HEALTH CARE EDUCATION/TRAINING PROGRAM

## 2024-12-12 PROCEDURE — C1769 GUIDE WIRE: HCPCS | Performed by: STUDENT IN AN ORGANIZED HEALTH CARE EDUCATION/TRAINING PROGRAM

## 2024-12-12 PROCEDURE — 52287 CYSTOSCOPY CHEMODENERVATION: CPT | Performed by: STUDENT IN AN ORGANIZED HEALTH CARE EDUCATION/TRAINING PROGRAM

## 2024-12-12 PROCEDURE — 3700000002 HC GENERAL ANESTHESIA TIME - EACH INCREMENTAL 1 MINUTE: Performed by: STUDENT IN AN ORGANIZED HEALTH CARE EDUCATION/TRAINING PROGRAM

## 2024-12-12 PROCEDURE — 2500000004 HC RX 250 GENERAL PHARMACY W/ HCPCS (ALT 636 FOR OP/ED)

## 2024-12-12 PROCEDURE — 7100000009 HC PHASE TWO TIME - INITIAL BASE CHARGE: Performed by: STUDENT IN AN ORGANIZED HEALTH CARE EDUCATION/TRAINING PROGRAM

## 2024-12-12 PROCEDURE — 3600000008 HC OR TIME - EACH INCREMENTAL 1 MINUTE - PROCEDURE LEVEL THREE: Performed by: STUDENT IN AN ORGANIZED HEALTH CARE EDUCATION/TRAINING PROGRAM

## 2024-12-12 PROCEDURE — 2720000007 HC OR 272 NO HCPCS: Performed by: STUDENT IN AN ORGANIZED HEALTH CARE EDUCATION/TRAINING PROGRAM

## 2024-12-12 PROCEDURE — 7100000002 HC RECOVERY ROOM TIME - EACH INCREMENTAL 1 MINUTE: Performed by: STUDENT IN AN ORGANIZED HEALTH CARE EDUCATION/TRAINING PROGRAM

## 2024-12-12 PROCEDURE — 7100000010 HC PHASE TWO TIME - EACH INCREMENTAL 1 MINUTE: Performed by: STUDENT IN AN ORGANIZED HEALTH CARE EDUCATION/TRAINING PROGRAM

## 2024-12-12 PROCEDURE — 36415 COLL VENOUS BLD VENIPUNCTURE: CPT | Performed by: STUDENT IN AN ORGANIZED HEALTH CARE EDUCATION/TRAINING PROGRAM

## 2024-12-12 RX ORDER — LIDOCAINE HYDROCHLORIDE 20 MG/ML
INJECTION, SOLUTION INFILTRATION; PERINEURAL AS NEEDED
Status: DISCONTINUED | OUTPATIENT
Start: 2024-12-12 | End: 2024-12-12

## 2024-12-12 RX ORDER — HYDROMORPHONE HYDROCHLORIDE 0.2 MG/ML
0.2 INJECTION INTRAMUSCULAR; INTRAVENOUS; SUBCUTANEOUS EVERY 5 MIN PRN
Status: DISCONTINUED | OUTPATIENT
Start: 2024-12-12 | End: 2024-12-12 | Stop reason: HOSPADM

## 2024-12-12 RX ORDER — CEFAZOLIN SODIUM 2 G/100ML
2 INJECTION, SOLUTION INTRAVENOUS ONCE
Status: COMPLETED | OUTPATIENT
Start: 2024-12-12 | End: 2024-12-12

## 2024-12-12 RX ORDER — HYDRALAZINE HYDROCHLORIDE 20 MG/ML
5 INJECTION INTRAMUSCULAR; INTRAVENOUS EVERY 30 MIN PRN
Status: DISCONTINUED | OUTPATIENT
Start: 2024-12-12 | End: 2024-12-12 | Stop reason: HOSPADM

## 2024-12-12 RX ORDER — SUCCINYLCHOLINE CHLORIDE 20 MG/ML
INJECTION INTRAMUSCULAR; INTRAVENOUS AS NEEDED
Status: DISCONTINUED | OUTPATIENT
Start: 2024-12-12 | End: 2024-12-12

## 2024-12-12 RX ORDER — ALBUTEROL SULFATE 0.83 MG/ML
2.5 SOLUTION RESPIRATORY (INHALATION) ONCE AS NEEDED
Status: DISCONTINUED | OUTPATIENT
Start: 2024-12-12 | End: 2024-12-12 | Stop reason: HOSPADM

## 2024-12-12 RX ORDER — NITROFURANTOIN 25; 75 MG/1; MG/1
100 CAPSULE ORAL 2 TIMES DAILY
Qty: 10 CAPSULE | Refills: 0 | Status: SHIPPED | OUTPATIENT
Start: 2024-12-12 | End: 2024-12-17

## 2024-12-12 RX ORDER — MEPERIDINE HYDROCHLORIDE 25 MG/ML
12.5 INJECTION INTRAMUSCULAR; INTRAVENOUS; SUBCUTANEOUS EVERY 10 MIN PRN
Status: DISCONTINUED | OUTPATIENT
Start: 2024-12-12 | End: 2024-12-12 | Stop reason: HOSPADM

## 2024-12-12 RX ORDER — WATER 1 ML/ML
IRRIGANT IRRIGATION AS NEEDED
Status: DISCONTINUED | OUTPATIENT
Start: 2024-12-12 | End: 2024-12-12 | Stop reason: HOSPADM

## 2024-12-12 RX ORDER — LABETALOL HYDROCHLORIDE 5 MG/ML
5 INJECTION, SOLUTION INTRAVENOUS ONCE AS NEEDED
Status: DISCONTINUED | OUTPATIENT
Start: 2024-12-12 | End: 2024-12-12 | Stop reason: HOSPADM

## 2024-12-12 RX ORDER — FENTANYL CITRATE 50 UG/ML
INJECTION, SOLUTION INTRAMUSCULAR; INTRAVENOUS AS NEEDED
Status: DISCONTINUED | OUTPATIENT
Start: 2024-12-12 | End: 2024-12-12

## 2024-12-12 RX ORDER — PROPOFOL 10 MG/ML
INJECTION, EMULSION INTRAVENOUS AS NEEDED
Status: DISCONTINUED | OUTPATIENT
Start: 2024-12-12 | End: 2024-12-12

## 2024-12-12 RX ORDER — HYDROMORPHONE HYDROCHLORIDE 0.2 MG/ML
0.1 INJECTION INTRAMUSCULAR; INTRAVENOUS; SUBCUTANEOUS EVERY 5 MIN PRN
Status: DISCONTINUED | OUTPATIENT
Start: 2024-12-12 | End: 2024-12-12 | Stop reason: HOSPADM

## 2024-12-12 RX ORDER — PROPOFOL 10 MG/ML
INJECTION, EMULSION INTRAVENOUS CONTINUOUS PRN
Status: DISCONTINUED | OUTPATIENT
Start: 2024-12-12 | End: 2024-12-12

## 2024-12-12 RX ORDER — ONDANSETRON HYDROCHLORIDE 2 MG/ML
4 INJECTION, SOLUTION INTRAVENOUS ONCE AS NEEDED
Status: DISCONTINUED | OUTPATIENT
Start: 2024-12-12 | End: 2024-12-12 | Stop reason: HOSPADM

## 2024-12-12 RX ORDER — MIDAZOLAM HYDROCHLORIDE 1 MG/ML
INJECTION INTRAMUSCULAR; INTRAVENOUS AS NEEDED
Status: DISCONTINUED | OUTPATIENT
Start: 2024-12-12 | End: 2024-12-12

## 2024-12-12 SDOH — HEALTH STABILITY: MENTAL HEALTH: CURRENT SMOKER: 0

## 2024-12-12 ASSESSMENT — COLUMBIA-SUICIDE SEVERITY RATING SCALE - C-SSRS
6. HAVE YOU EVER DONE ANYTHING, STARTED TO DO ANYTHING, OR PREPARED TO DO ANYTHING TO END YOUR LIFE?: NO
1. IN THE PAST MONTH, HAVE YOU WISHED YOU WERE DEAD OR WISHED YOU COULD GO TO SLEEP AND NOT WAKE UP?: NO
1. IN THE PAST MONTH, HAVE YOU WISHED YOU WERE DEAD OR WISHED YOU COULD GO TO SLEEP AND NOT WAKE UP?: NO
2. HAVE YOU ACTUALLY HAD ANY THOUGHTS OF KILLING YOURSELF?: NO
6. HAVE YOU EVER DONE ANYTHING, STARTED TO DO ANYTHING, OR PREPARED TO DO ANYTHING TO END YOUR LIFE?: NO
2. HAVE YOU ACTUALLY HAD ANY THOUGHTS OF KILLING YOURSELF?: NO

## 2024-12-12 ASSESSMENT — PAIN SCALES - GENERAL
PAINLEVEL_OUTOF10: 0 - NO PAIN

## 2024-12-12 ASSESSMENT — PAIN - FUNCTIONAL ASSESSMENT
PAIN_FUNCTIONAL_ASSESSMENT: 0-10

## 2024-12-12 NOTE — ANESTHESIA PREPROCEDURE EVALUATION
Patient: Jose Fernandez    Procedure Information       Date/Time: 12/12/24 0700    Procedure: CYSTOSCOPY, WITH BOTULINUM TOXIN INJECTION (200 U)    Location: [facility] OR 04 / Virtual [facility] OR    Surgeons: Adam Vargas MD            Relevant Problems   Cardiac   (+) Arthralgia of right acromioclavicular joint      Pulmonary   (+) Asthma, mild persistent (HHS-HCC)   (+) Restrictive lung disease      Neuro   (+) C5-C7 incomplete quadriplegia (Multi)   (+) Quadriplegia and quadriparesis (Multi)      GI   (+) Gastroesophageal reflux      /Renal   (+) Recurrent UTI      Hematology   (+) Anemia      Musculoskeletal   (+) Neuromuscular scoliosis      ID   (+) Recurrent UTI       Clinical information reviewed:    Allergies                NPO Detail:  NPO/Void Status  Date of Last Liquid: 12/11/24  Time of Last Liquid: 2200  Date of Last Solid: 12/11/24  Time of Last Solid: 2200  Last Intake Type: Clear fluids  Time of Last Void: 0445         Physical Exam    Airway  Mallampati: III  TM distance: >3 FB     Cardiovascular   Rhythm: regular  Rate: normal     Dental - normal exam     Pulmonary   Breath sounds clear to auscultation     Abdominal   (+) obese           Anesthesia Plan    History of general anesthesia?: yes  History of complications of general anesthesia?: no    ASA 2     MAC     The patient is not a current smoker.    intravenous induction   Trial extubation is planned.  Anesthetic plan and risks discussed with patient.  Use of blood products discussed with patient and mother who consented to blood products.    Plan discussed with attending.

## 2024-12-12 NOTE — OP NOTE
CYSTOSCOPY, WITH BOTULINUM TOXIN INJECTION (200 U) Operative Note     Date: 2024  OR Location: [facility] OR    Name: Jose Fernandez, : 2000, Age: 24 y.o., MRN: [id number], Sex: male    Diagnosis  Pre-op Diagnosis      * Neurogenic bladder [N31.9] Post-op Diagnosis     * Neurogenic bladder [N31.9]     Procedures  CYSTOSCOPY, WITH BOTULINUM TOXIN INJECTION (200 U)  27568 - [place] CYSTOURETHROSCOPY INJ CHEMODENERVATION BLADDER    [place] INJECTION,ONABOTULINUMTOXINA []  Surgeons      * Adam Vargas - Primary    Resident/Fellow/Other Assistant:  Surgeons and Role:     * Avelino Urbina MD - Resident - Assisting    Staff:   Circulator: Karley  Scrub Person: Reynold Holguinub Person: Sandra    Anesthesia Staff: Anesthesiologist: Marvin Thompson MD  Anesthesia Resident: Zac Villa MD    Procedure Summary  Anesthesia: Monitor Anesthesia Care  ASA: II  Estimated Blood Loss: 1mL  Intra-op Medications:   Administrations occurring from 0700 to 0820 on 24:   Medication Name Total Dose   sterile water irrigation solution 3,000 mL   onabotulinumtoxinA (Botox) injection 200 Units   fentaNYL (Sublimaze) injection 50 mcg/mL 100 mcg   lidocaine (Xylocaine) 2 % 60 mg   midazolam PF (Versed) injection 1 mg/mL 2 mg   propofol (Diprivan) infusion 10 mg/mL 154.8 mg   propofol (Diprivan) injection 10 mg/mL 200 mg   succinylcholine 20 mg/mL vial 170 mg   ceFAZolin (Ancef) 2 g in dextrose (iso)  mL 2 g              Anesthesia Record               Intraprocedure I/O Totals          Intake    Propofol Drip 0.00 mL    The total shown is the total volume documented since Anesthesia Start was filed.    Total Intake 0 mL          Specimen:   ID Type Source Tests Collected by Time   A : URINE CULTURE Fluid Cystoscopic URINE CULTURE Adam Vargas MD 2024 [id number]         Drains and/or Catheters: * None in log *      Findings: mildly trabeculated bladder, uncomplicated intradetrusor injection of 200 U  Botox    Indications: Jose Fernandez is an 24 y.o. male who is having surgery for Neurogenic bladder [N31.9].     The patient was seen in the preoperative area. The risks, benefits, complications, treatment options, non-operative alternatives, expected recovery and outcomes were discussed with the patient. The possibilities of reaction to medication, pulmonary aspiration, injury to surrounding structures, bleeding, recurrent infection, the need for additional procedures, failure to diagnose a condition, and creating a complication requiring transfusion or operation were discussed with the patient. The patient concurred with the proposed plan, giving informed consent.  The site of surgery was properly noted/marked if necessary per policy. The patient has been actively warmed in preoperative area. Preoperative antibiotics have been ordered and given within 1 hours of incision. Venous thrombosis prophylaxis have been ordered including bilateral sequential compression devices    Procedure Details:     Patient was consented in the preoperative area. Risks and benefits of the procedure were discussed. Patient was brought to the operating room and placed in the supine position on the OR table. A timeout was performed and all were in agreement. Preoperative antibiotics (Ancef) were administered. Patient underwent anesthesia without complication. Pt was repositioned in dorsal lithotomy then prepped and draped in the usual sterile fashion.     A 21 Fr rigid cystoscope was used to perform pan-cystoscopy.  Soft large bore bulbar stricture easily traversed with scope.  Upon entry into the bladder obtained urine sample for culture.  No bladder masses or lesions were identified. UOs were noted in normal orthotopic position.  Cystoscopic injection needle was set to 4mm depth. 200 units of botox were diluted into 20mL of normal saline. Patient received 20 x 1mL intravesical botox injections in a grid-like pattern, approximately 1  cm apart. One injection  was at the trigone. Visible blood vessels were avoided.  Cystoscopy was performed again to ensure hemostasis. Then, bladder was drained and instruments removed. Patient was awoken from anesthesia without complication and transferred to PACU in stable condition.    Patient was discharged home with 3d prophylactic antibiotics and will follow up in clinic postoperatively.     Complications:  None; patient tolerated the procedure well.    Disposition: PACU - hemodynamically stable.  Condition: stable       Additional Details: None    Attending Attestation:   I was present during all critical and key portions of the procedure(s) and immediately available to furnish services the entire duration.  See resident note for details.     Adam Vargas MD

## 2024-12-12 NOTE — INTERVAL H&P NOTE
H&P reviewed. The patient was examined and there are no changes to the H&P.      Avelino Urbina MD  Urology Resident (PGY-5)  Adult Pager [phone]  Pediatric Pager [phone]

## 2024-12-12 NOTE — DISCHARGE INSTRUCTIONS
Urology Stamps    DISCHARGE INSTRUCTIONS     Cystoscopy with botox injection    Jose Fernandez      Call [phone] during regular daytime business hours (8:00 am - 5:00 pm) and after 5:00 pm ask for the Urology resident with any questions or concerns.      If it is a life-threatening situation, proceed to the nearest emergency department.        Follow-up appointment:  ~6-8 weeks     Thank you for the opportunity to care for you today.  Your health and healing are very important to us.  We hope we made you feel as comfortable as possible and are committed to your recovery and continued well-being.      The following is a brief overview of your cystoscopy procedure today. Some of the information contained on this summary may be confidential.  This information should be kept in your records and should be shared with your regular doctor.    Physicians:   Dr. Vargas      Procedure performed: cystoscopy (looked inside your bladder)      What to Expect During your Recovery and Home Care  Anesthesia Side Effects   You received anesthesia today.  You may feel sleepy, tired, or have a sore throat.   You may also feel drowsiness, dizziness, or inability to think clearly.  For your safety, do not drive, drink alcoholic beverages, take any unprescribed medication or make any important decisions for 24 hours.  A responsible adult should be with you for 24 hours.        Activity and Recovery    No heavy lifting today. Rest for the next 24 hours.       Pain Control  Unfortunately, you may experience pain after your procedure.  Frequency and urgency to urinate and mild discomfort are expected. Adequate pain management can include alternative measures to help ease your pain and that can include over the counter Tylenol or ibuprofen which can be taken as prescribed as needed for breakthrough pain. Do not take more than 4,000mg of Tylenol in a 24-hour period.        Nausea/Vomiting   Clear liquids are best tolerated at  first. Start slow, advance your diet as tolerated to normal foods. Avoid spicy, greasy, heavy foods at first. Also, you may feel nauseous or like you need to vomit if you take any type of medication on an empty stomach.  Call your physician if you are unable to eat or drink and have persistent vomiting.    Signs of Bleeding   You are going to have some blood in your urine. Your urine will be light pink to yellow. If urine becomes thick dark teena red, has large clots or you are unable to urinate, please notify your physician.    Treatment/wound care:   Keep area(s) clean and dry.   It is okay to shower 24 hours after time of surgery.      Signs of Infection  Signs of infection can include fever, drainage(green/yellow), chills, burning sensation with passing of urine, or severe abdominal pain.  If you see any of these occur, please contact your doctor's office at [phone].  Any fever higher than 100.4, especially if associated with an ill feeling, abdominal pain, chills, or nausea should be reported to your surgeon.        Assist in bowel movements/urination  Increase fiber in diet  Increase water (6 to 8 glasses)  Increase walking   Urination should occur within 6 hours of anesthesia  If you have tried these methods and your bladder still feels full and you cannot use the bathroom, please go to your nearest Emergency room/contact your physician.    Additional Instructions:   Increase water intake for the next 24 hours to help flush out our urinary system.  Take macrobid antibiotic as prescribed/.

## 2024-12-12 NOTE — ANESTHESIA PROCEDURE NOTES
Airway  Date/Time: 12/12/2024 7:34 AM  Urgency: elective    Difficult airway    Staffing  Performed: resident   Authorized by: Marvin Thompson MD    Performed by: Zac Villa MD  Patient location during procedure: OR    Indications and Patient Condition  Indications for airway management: anesthesia and airway protection  Spontaneous ventilation: present  Sedation level: deep  Preoxygenated: yes  Patient position: sniffing  Mask difficulty assessment: 3 - difficult mask (inadequate, unstable or two providers) +/- NMBA    Final Airway Details  Final airway type: endotracheal airway      Successful airway: ETT  Cuffed: yes   Successful intubation technique: video laryngoscopy  Facilitating devices/methods: intubating stylet  Endotracheal tube insertion site: oral  Blade: Dulce  ETT size (mm): 7.5  Cormack-Lehane Classification: grade IIa - partial view of glottis  Placement verified by: chest auscultation and capnometry   Measured from: lips  ETT to lips (cm): 23  Number of attempts at approach: 1

## 2024-12-12 NOTE — ANESTHESIA POSTPROCEDURE EVALUATION
Patient: Jose Fernandez    Procedure Summary       Date: 12/12/24 Room / Location: [facility] OR 04 / [facility] [place] [facility] [place]    Anesthesia Start: 0721 Anesthesia Stop: 0817    Procedure: CYSTOSCOPY, WITH BOTULINUM TOXIN INJECTION (200 U) Diagnosis:       Neurogenic bladder      (Neurogenic bladder [N31.9])    Surgeons: Adam Vargas MD Responsible Provider: Marvin Thompson MD    Anesthesia Type: MAC ASA Status: 2            Anesthesia Type: MAC    Vitals Value Taken Time   /66 12/12/24 0811   Temp 36.5 12/12/24 0818   Pulse 82 12/12/24 0814   Resp 8 12/12/24 0814   SpO2 100 % 12/12/24 0814   Vitals shown include unfiled device data.    Anesthesia Post Evaluation    Patient location during evaluation: PACU  Patient participation: complete - patient participated  Level of consciousness: awake and alert  Pain management: satisfactory to patient  Multimodal analgesia pain management approach  Airway patency: patent  Two or more strategies used to mitigate risk of obstructive sleep apnea  Cardiovascular status: blood pressure returned to baseline  Respiratory status: acceptable, nasal cannula and nasal airway  Hydration status: acceptable  Postoperative Nausea and Vomiting: none      Encounter Notable Events   Notable Event Outcome Phase Comment   Difficult mask airway  Intraprocedure

## 2024-12-14 LAB — BACTERIA UR CULT: ABNORMAL

## 2024-12-16 DIAGNOSIS — N39.0 RECURRENT UTI: Primary | ICD-10-CM

## 2024-12-16 DIAGNOSIS — N30.00 ACUTE CYSTITIS WITHOUT HEMATURIA: ICD-10-CM

## 2024-12-16 RX ORDER — CEPHALEXIN 500 MG/1
500 CAPSULE ORAL 4 TIMES DAILY
Qty: 20 CAPSULE | Refills: 0 | Status: SHIPPED | OUTPATIENT
Start: 2024-12-16 | End: 2024-12-21

## 2024-12-16 NOTE — PROGRESS NOTES
Culture resulted with patient on macrobid. Called and instructed to switch to keflex for 5 days as he has some mild symptoms presently.

## 2025-01-06 ENCOUNTER — APPOINTMENT (OUTPATIENT)
Dept: PEDIATRIC GASTROENTEROLOGY | Facility: CLINIC | Age: 25
End: 2025-01-06
Payer: COMMERCIAL

## 2025-01-10 DIAGNOSIS — K21.9 GASTROESOPHAGEAL REFLUX DISEASE WITHOUT ESOPHAGITIS: ICD-10-CM

## 2025-01-21 DIAGNOSIS — N39.45 URINARY INCONTINENCE WITH CONTINUOUS LEAKAGE: ICD-10-CM

## 2025-01-22 RX ORDER — MIRABEGRON 50 MG/1
50 TABLET, FILM COATED, EXTENDED RELEASE ORAL DAILY
Qty: 30 TABLET | Refills: 11 | Status: SHIPPED | OUTPATIENT
Start: 2025-01-22

## 2025-01-22 NOTE — PROGRESS NOTES
Patient: Jose Fernandez    [id number]  : 2000 -- AGE 24 y.o.    Provider: Griselda Shea     Location [facility]   Service Date: 2025            [facility]  Follow-up Visit Note      HISTORY OF PRESENT ILLNESS     The patient's referring provider is: No ref. provider found Lyric Cameron from [place]     HISTORY OF PRESENT ILLNESS   Jose Fernandez is a 24 y.o. male who presents to a [facility] for an evaluation with concerns of reactive airway disease. I have independently interviewed and examined the patient in the office and reviewed available records.     I personally reviewed the note from Dr. Cameron notes from date May 2023 . This is contributing to my history, assessment, and plan:  This is a Dr. Cameron notes - this is a 24 yo with complicated medical history including underlying myelomalacia with hemiparesis, muscular weakness, restrictive lung disease, reactive airway disease/asthma, and history of respiratory insufficiency with BiPap supplementation at night, when he is ill - none in MANY years. Also remote history of tracheostomy; he was decannulated in 2005     He has hx peritonitis with associated pleural effusions. FVC is lower %62. FEV1 is stable around 70%. impaired airway clearance - would recommend something to use when sick - continue acapella PRN.        Current History    On today's visit, the patient reports as a child had a trach and ventilator as a child, he got removed about 4-5 years. He uses the nebs every 3 hours when he is sick. Denies SOB at rest. He is quadriplegic .    Denies orthopnea - he has baclofen pump as he can have spasticity, pnd, or matti.  Weight has been mostly stable.  Denies  chronic cough, denies wheezing, and denies clear, green, blood streaks. No night cough. No hemoptysis. No fever or shivering chills. Has no runny nose, or a tingling sensation in the back of  his throat. Also complains of heartburn - has pepcid twice a day. Denies chest pain or heartburn.     Previous pulmonary history: Has restrictive/reactive airway.     Inhalers/nebulized medications: symbicort - only using prn, albuterol nebulizer - when sick    Hospitalization History: Not been hospitalized over the last year for breathing related problem. PNA in 2021    Sleep history:  Denies snoring, apnea, feeling tired during the day or taking naps during the day.     Current History 7/24/2024    On today's visit, the patient reports Has used albuterol occasionally. Denies SOB at rest. He got RSV vaccine.  He currently is being treated for a UTI but denies cough wheezing fever chills runny nose postnasal drip or heartburn he notes he gets UTIs from Botox injection injections but has not had any pneumonia symptoms.  Only used albuterol a Few times    Current History 1/27/2025    On today's visit, the patient reports he had trouble with intubation with bladder botux injection - he has had it done 3-4 times.. He was fatigued x 3 days.   Intermittently needed ALBUTEROL - he can do prevention if family members are sick. A couple times a week. He is doing inhaler every 2 days.   Has not used Bipap in the last 6 months.   Denies any other complaints.   Denies cough, wheeze, chest pain, shortness of breath at rest or GERD   NO respiratory illness since last visit     ALLERGIES AND MEDICATIONS     ALLERGIES  Allergies   Allergen Reactions    Ciprofloxacin Anaphylaxis    Sulfamethoxazole-Trimethoprim Anaphylaxis    Sutures Other     Patients mother states patient reacts to dissolveable suture     Fish Containing Products Hives    Other Other    Shellfish Containing Products Hives    Vancomycin Other     Red Man's syndrome        MEDICATIONS  Current Outpatient Medications   Medication Sig Dispense Refill    cholecalciferol (Vitamin D-3) 50 mcg (2,000 unit) capsule TAKE 1 CAPSULE Daily      famotidine (Pepcid) 40 mg tablet  Take 1 tablet (40 mg) by mouth 2 times a day. 60 tablet 11    ferrous sulfate ER (Slow Fe) 142 mg ER tablet Take 1 tablet by mouth once daily.      L. ACIDOPHILUS/BIFID. ANIMALIS ORAL Take 1 capsule by mouth once daily.      mirabegron (Myrbetriq) 50 mg tablet extended release 24 hr 24 hr tablet Take 1 tablet (50 mg) by mouth once daily. 30 tablet 11    mometasone (Asmanex Twisthaler) 220 mcg/ actuation (14) inhaler Inhale 2 puffs once daily as needed. Rinse mouth with water after use to reduce aftertaste and incidence of candidiasis. Do not swallow.      oxybutynin XL (Ditropan-XL) 15 mg 24 hr tablet Take 1 tablet (15 mg) by mouth 2 times a day. 60 tablet 11    albuterol 2.5 mg /3 mL (0.083 %) nebulizer solution Take 3 mL (2.5 mg) by nebulization every 4 hours if needed for wheezing. Inhale one vial every 4-6 hours as needed for cough, wheezing and shortness of breath 75 mL 3    budesonide-formoteroL (Symbicort) 160-4.5 mcg/actuation inhaler Inhale 2 puffs 2 times a day. Rinse mouth with water after use to reduce aftertaste and incidence of candidiasis. Do not swallow. 10.2 g 3    montelukast (Singulair) 10 mg tablet Take 1 tablet (10 mg) by mouth once daily. 30 tablet 11     Current Facility-Administered Medications   Medication Dose Route Frequency Provider Last Rate Last Admin    baclofen (Lioresal) 2,000 mcg/mL intrathecal injection  300 mcg/day intrathecal Continuous Yoko Riley MD 0.006 mL/hr at 06/27/24 1125 300 mcg/day at 06/27/24 1125    baclofen (Lioresal) 2,000 mcg/mL intrathecal injection  300 mcg/day intrathecal Continuous Yoko Riley MD 0.006 mL/hr at 11/21/24 0942 300 mcg/day at 11/21/24 0942    baclofen (Lioresal) intrathecal injection  100 mcg/day intrathecal Continuous Yoko Riley MD 0.002 mL/hr at 01/11/24 1101 100 mcg/day at 01/11/24 1101    onabotulinumtoxinA (Botox) injection 200 Units  200 Units intravesical Once Adam Vargas MD             PAST HISTORY      PAST MEDICAL HISTORY  He  has a past medical history of Abnormal results of pulmonary function studies (03/21/2019), 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), Asthma, GERD (gastroesophageal reflux disease), Hemiplegia (Multi), History of transfusion, Immunocompromised, Intra-abdominal abscess (Multi), Mitrofanoff appendicovesicostomy present (Multi), MSSA (methicillin susceptible Staphylococcus aureus), Neurogenic bladder, Neurogenic bowel, Other conditions influencing health status (07/21/2013), Other conditions influencing health status (03/04/2022), Pectus excavatum (07/21/2013), Peritoneal abscess (Multi) (05/24/2021), Personal history of diseases of the skin and subcutaneous tissue (05/17/2019), Personal history of diseases of the skin and subcutaneous tissue, Personal history of other diseases of the digestive system (04/13/2020), Personal history of other infectious and parasitic diseases (07/24/2019), Personal history of other infectious and parasitic diseases (09/24/2018), Recurrent UTI (urinary tract infection), Restrictive lung disease, Scoliosis, unspecified, Snoring, Tinea pedis (07/23/2019), Urinary tract infection, UTI (urinary tract infection), and Xerosis cutis (07/23/2019).    PAST SURGICAL HISTORY  Past Surgical History:   Procedure Laterality Date    ACHILLES TENDON SURGERY  10/30/2013    Subcutaneous Tenotomy Achilles Tendon Under Gen Anesthesia    BACLOFEN PUMP IMPLANTATION  2019    BRONCHOSCOPY      CYSTOSCOPY      GASTROSTOMY  10/30/2013    Gastric Surgery Feeding Gastrostomy s/p removal    MITROFANOFF PROCEDURE  08/06/2018    NISSEN FUNDOPLICATION      2003    OTHER SURGICAL HISTORY  10/30/2013    Direct Laryngoscopy    OTHER SURGICAL HISTORY  10/30/2013    Closed Treatment Fx Of Proximal Femoral Neck W/ Manipulation    OTHER SURGICAL HISTORY  04/16/2015    Complex Repair Of Wound Trunk 2.6 To 7.5 Cm    OTHER SURGICAL HISTORY  10/14/2022    Circumcision     SPINAL FUSION      x2 2015, 2017    TRACHEOSTOMY TUBE PLACEMENT      s/p decannulation 2005    US GUIDED PERCUTANEOUS PERITONEAL OR RETROPERITONEAL FLUID COLLECTION DRAINAGE  04/08/2021    US GUIDED PERCUTANEOUS PERITONEAL OR RETROPERITONEAL FLUID COLLECTION DRAINAGE 4/8/2021 [facility]       IMMUNIZATION HISTORY  Immunization History   Administered Date(s) Administered    DTaP vaccine, pediatric  (INFANRIX) 2000, 2002, 2002, 06/06/2003, 09/28/2007    Flu vaccine (IIV4), preservative free *Check age/dose* 11/26/2014, 11/03/2017, 09/30/2020    Flu vaccine, trivalent, preservative free, age 6 months and greater (Fluarix/Fluzone/Flulaval) 08/28/2012    HPV 9-valent vaccine (GARDASIL 9) 05/22/2024, 06/25/2024, 11/26/2024    Hep A, Unspecified 07/16/2010, 08/05/2011    Hep B, Unspecified 2002, 2002, 03/14/2003    HiB, unspecified 2002, 2002, 2002, 06/06/2003    Influenza, injectable, quadrivalent 03/04/2022    Influenza, seasonal, injectable 10/17/2008, 10/02/2009, 10/01/2022, 10/13/2022    MMR vaccine, subcutaneous (MMR II) 03/14/2003, 09/28/2007    Meningococcal ACWY vaccine (MENVEO) 09/30/2020    Meningococcal ACWY-D (Menactra) 4-valent conjugate vaccine 11/26/2014    Meningococcal B, Unspecified 09/30/2020    Meningococcal MPSV4 11/26/2014    Moderna SARS-CoV-2 Vaccination 02/13/2021, 03/13/2021, 05/01/2021    Novel influenza-H1N1-09, preservative-free 11/24/2009, 01/15/2010    Pneumococcal conjugate vaccine, 13-valent (PREVNAR 13) 2002, 2002, 03/14/2003, 06/06/2003, 10/17/2008    Pneumococcal polysaccharide vaccine, 23-valent, age 2 years and older (PNEUMOVAX 23) 03/04/2022    Polio, Unspecified 2002, 2002, 2002, 09/28/2007    Tdap vaccine, age 7 year and older (BOOSTRIX, ADACEL) 06/26/2014, 09/30/2020    Varicella vaccine, subcutaneous (VARIVAX) 03/14/2003, 09/28/2007       SOCIAL HISTORY  He  reports that he has never  smoked. He has never been exposed to tobacco smoke. He has never used smokeless tobacco. He reports current alcohol use. He reports that he does not use drugs. He Patient     OCCUPATIONAL/ENVIRONMENTAL HISTORY  Previously worked as: disabled   DOES/DOES NOT EC: does not have known exposure to asbestos, silica, beryllium or inhaled metals.  DOES/DOES NOT EC: does not have exposure to birds or exotic animals.    FAMILY HISTORY  Family History   Problem Relation Name Age of Onset    Diabetes Maternal Grandfather       DOES/DOES NOT EC: does not have a family history of pulmonary disease.  DOES/DOES NOT EC: does not have a family history of cancer.  DOES/DOES NOT EC: does not have a family history of autoimmune disorders.    RESULTS/DATA     Pulmonary Function Test Results         Complete Pulmonary Function Test Pre/Post Bronchodialator (Spirometry Pre/Post/DLCO/Lung Volumes) 5/16/2024  Status: Final result     Study Result    Narrative & Impression   Spirometry shows no obstruction. There is no significant bronchodilator response.  Lung volumes indicate a moderate restrictive defect. There is hyperinflation present. The DLCO corrected for hemoglobin is mildly reduced.     Scans on Order [id number]      Pulmonary Function Test - Scan on 5/16/2024  9:19 AM: Exhaled nitric oxide                      Pulmonary Function Test - Scan on 5/17/2024  5:26 PM                                          Chest Radiograph     XR chest 1 view 08/08/2023      Impression  No significant changes.    Low lung volumes with bronchopulmonary crowding.    No focal consolidation.    No pleural effusion or pneumothorax seen.    Cardiac silhouette normal in size.    Stable postop changes of the spine fusion.      MACRO:  None      Chest CT Scan         Echocardiogram     No testing done         REVIEW OF SYSTEMS     REVIEW OF SYSTEMS  Review of Systems   All other systems reviewed and are negative.        PHYSICAL EXAM     VITAL SIGNS: /64   " Pulse 76   Temp 37.2 °C (98.9 °F)   Resp 18   Ht 1.753 m (5' 9\")   SpO2 97%   BMI 29.30 kg/m²  /64   Pulse 76   Temp 37.2 °C (98.9 °F)   Resp 18   Ht 1.753 m (5' 9\")   SpO2 97%   BMI 29.30 kg/m²  /64   Pulse 76   Temp 37.2 °C (98.9 °F)   Resp 18   Ht 1.753 m (5' 9\")   SpO2 97%   BMI 29.30 kg/m²      CURRENT WEIGHT: [unfilled]  BMI: [unfilled]  PREVIOUS WEIGHTS:  Wt Readings from Last 3 Encounters:   12/12/24 90 kg (198 lb 6.6 oz)   12/09/24 90.7 kg (200 lb)   07/24/24 86.6 kg (191 lb)       Physical Exam  Constitutional:       Appearance: Normal appearance.   HENT:      Head: Normocephalic and atraumatic.      Right Ear: External ear normal.      Left Ear: External ear normal.      Nose: Nose normal.   Eyes:      Extraocular Movements: Extraocular movements intact.      Conjunctiva/sclera: Conjunctivae normal.      Pupils: Pupils are equal, round, and reactive to light.   Cardiovascular:      Rate and Rhythm: Normal rate and regular rhythm.      Pulses: Normal pulses.      Heart sounds: Normal heart sounds.   Pulmonary:      Effort: Pulmonary effort is normal.      Breath sounds: Normal breath sounds.   Musculoskeletal:         General: Normal range of motion.      Cervical back: Normal range of motion and neck supple.      Comments: Upper extremity   Skin:     General: Skin is warm and dry.   Neurological:      General: No focal deficit present.      Mental Status: He is alert and oriented to person, place, and time. Mental status is at baseline.      Comments: In a wheel chair quadrapelgic   Psychiatric:         Mood and Affect: Mood normal.         Behavior: Behavior normal.         Thought Content: Thought content normal.         Judgment: Judgment normal.           ASSESSMENT/PLAN     Mr. Fernandez is a 24 y.o. 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), " Asthma, GERD (gastroesophageal reflux disease), Hemiplegia (Multi), History of transfusion, Immunocompromised, Intra-abdominal abscess (Multi), Mitrofanoff appendicovesicostomy present (Multi), MSSA (methicillin susceptible Staphylococcus aureus), Neurogenic bladder, Neurogenic bowel, Other conditions influencing health status (07/21/2013), Other conditions influencing health status (03/04/2022), Pectus excavatum (07/21/2013), Peritoneal abscess (Multi) (05/24/2021), Personal history of diseases of the skin and subcutaneous tissue (05/17/2019), Personal history of diseases of the skin and subcutaneous tissue, Personal history of other diseases of the digestive system (04/13/2020), Personal history of other infectious and parasitic diseases (07/24/2019), Personal history of other infectious and parasitic diseases (09/24/2018), Recurrent UTI (urinary tract infection), Restrictive lung disease, Scoliosis, unspecified, Snoring, Tinea pedis (07/23/2019), Urinary tract infection, UTI (urinary tract infection), and Xerosis cutis (07/23/2019). He was referred to the [facility] for evaluation of reactive airway disease    Problem List and Orders      Assessment and Plan / Recommendations:  Problem List Items Addressed This Visit    None        Reactive airway disease  - albuterol hfa 2 puffs or albuterol nebs every 4-6 hours as needed  - cont symbicort as needed  - has acapella device he uses when he is sick  - bipap at night when he is sick - not used in last 6 months   Allergy medications prescribed/continued today include: nasal spray as needed, Zyrtec/cetirizine as need, Singulair/montelukast daily      pulmonary function test and FENO 16 ---Spirometry shows no obstruction. There is no significant bronchodilator response. Lung volumes indicate a moderate restrictive defect. There is hyperinflation present. The DLCO corrected for hemoglobin is mildly reduced.         Thank you for  visiting the Pulmonary clinic today!       Return to clinic after ___6 months and/or sooner if needed   Griselda Shea CNP  My office number is [phone] -     Any test results will be discussed at next visit -- please make sure to make a follow up appt after testing.

## 2025-01-24 ENCOUNTER — DOCUMENTATION (OUTPATIENT)
Dept: UROLOGY | Facility: CLINIC | Age: 25
End: 2025-01-24
Payer: COMMERCIAL

## 2025-01-27 ENCOUNTER — APPOINTMENT (OUTPATIENT)
Dept: PULMONOLOGY | Facility: CLINIC | Age: 25
End: 2025-01-27
Payer: COMMERCIAL

## 2025-01-27 VITALS
DIASTOLIC BLOOD PRESSURE: 64 MMHG | HEART RATE: 76 BPM | HEIGHT: 69 IN | RESPIRATION RATE: 18 BRPM | SYSTOLIC BLOOD PRESSURE: 100 MMHG | TEMPERATURE: 98.9 F | OXYGEN SATURATION: 97 % | BODY MASS INDEX: 29.3 KG/M2

## 2025-01-27 DIAGNOSIS — G70.9 NEUROMUSCULAR WEAKNESS (MULTI): ICD-10-CM

## 2025-01-27 DIAGNOSIS — J45.20 MILD INTERMITTENT REACTIVE AIRWAY DISEASE WITHOUT COMPLICATION (HHS-HCC): ICD-10-CM

## 2025-01-27 DIAGNOSIS — G82.50 QUADRIPLEGIA AND QUADRIPARESIS (MULTI): Primary | ICD-10-CM

## 2025-01-27 DIAGNOSIS — J98.4 RESTRICTIVE LUNG DISEASE: ICD-10-CM

## 2025-01-27 PROCEDURE — 1036F TOBACCO NON-USER: CPT | Performed by: NURSE PRACTITIONER

## 2025-01-27 PROCEDURE — 99214 OFFICE O/P EST MOD 30 MIN: CPT | Performed by: NURSE PRACTITIONER

## 2025-01-27 RX ORDER — ALBUTEROL SULFATE 0.83 MG/ML
2.5 SOLUTION RESPIRATORY (INHALATION) EVERY 4 HOURS PRN
Qty: 75 ML | Refills: 3 | Status: SHIPPED | OUTPATIENT
Start: 2025-01-27 | End: 2025-02-26

## 2025-01-27 ASSESSMENT — COLUMBIA-SUICIDE SEVERITY RATING SCALE - C-SSRS
2. HAVE YOU ACTUALLY HAD ANY THOUGHTS OF KILLING YOURSELF?: NO
1. IN THE PAST MONTH, HAVE YOU WISHED YOU WERE DEAD OR WISHED YOU COULD GO TO SLEEP AND NOT WAKE UP?: NO
6. HAVE YOU EVER DONE ANYTHING, STARTED TO DO ANYTHING, OR PREPARED TO DO ANYTHING TO END YOUR LIFE?: NO

## 2025-01-27 ASSESSMENT — PATIENT HEALTH QUESTIONNAIRE - PHQ9
1. LITTLE INTEREST OR PLEASURE IN DOING THINGS: NOT AT ALL
SUM OF ALL RESPONSES TO PHQ9 QUESTIONS 1 AND 2: 0
2. FEELING DOWN, DEPRESSED OR HOPELESS: NOT AT ALL

## 2025-01-31 RX ORDER — FAMOTIDINE 40 MG/1
40 TABLET, FILM COATED ORAL 2 TIMES DAILY
Qty: 60 TABLET | Refills: 0 | Status: SHIPPED | OUTPATIENT
Start: 2025-01-31

## 2025-02-12 DIAGNOSIS — R39.9 UTI SYMPTOMS: ICD-10-CM

## 2025-02-12 NOTE — PROGRESS NOTES
Pt's mother left message that pt is having UTI symptoms .  Urine culture order placed . Mother notified .

## 2025-02-14 RX ORDER — CEPHALEXIN 500 MG/1
500 CAPSULE ORAL 4 TIMES DAILY
Qty: 40 CAPSULE | Refills: 0 | Status: SHIPPED | OUTPATIENT
Start: 2025-02-14 | End: 2025-02-24

## 2025-02-15 LAB — BACTERIA UR CULT: ABNORMAL

## 2025-02-19 ENCOUNTER — TELEPHONE (OUTPATIENT)
Dept: INTERNAL MEDICINE | Facility: HOSPITAL | Age: 25
End: 2025-02-19

## 2025-02-20 ENCOUNTER — TELEPHONE (OUTPATIENT)
Dept: PEDIATRIC GASTROENTEROLOGY | Facility: CLINIC | Age: 25
End: 2025-02-20
Payer: COMMERCIAL

## 2025-02-24 ENCOUNTER — APPOINTMENT (OUTPATIENT)
Dept: PEDIATRIC GASTROENTEROLOGY | Facility: CLINIC | Age: 25
End: 2025-02-24
Payer: COMMERCIAL

## 2025-02-24 VITALS
TEMPERATURE: 98.4 F | BODY MASS INDEX: 33.44 KG/M2 | HEART RATE: 60 BPM | DIASTOLIC BLOOD PRESSURE: 72 MMHG | SYSTOLIC BLOOD PRESSURE: 120 MMHG | RESPIRATION RATE: 20 BRPM | WEIGHT: 226.41 LBS

## 2025-02-24 DIAGNOSIS — K21.9 GASTROESOPHAGEAL REFLUX DISEASE WITHOUT ESOPHAGITIS: ICD-10-CM

## 2025-02-24 DIAGNOSIS — K59.09 CONSTIPATION, CHRONIC: ICD-10-CM

## 2025-02-24 DIAGNOSIS — K21.00 GASTROESOPHAGEAL REFLUX DISEASE WITH ESOPHAGITIS, UNSPECIFIED WHETHER HEMORRHAGE: Primary | ICD-10-CM

## 2025-02-24 PROCEDURE — 99213 OFFICE O/P EST LOW 20 MIN: CPT | Performed by: NURSE PRACTITIONER

## 2025-02-24 RX ORDER — POLYETHYLENE GLYCOL 3350 17 G/17G
17 POWDER, FOR SOLUTION ORAL
Qty: 527 G | Refills: 11 | Status: SHIPPED | OUTPATIENT
Start: 2025-02-24 | End: 2026-02-24

## 2025-02-24 RX ORDER — FAMOTIDINE 40 MG/1
40 TABLET, FILM COATED ORAL 2 TIMES DAILY
Qty: 60 TABLET | Refills: 0 | Status: SHIPPED | OUTPATIENT
Start: 2025-02-24

## 2025-02-24 RX ORDER — OMEPRAZOLE 40 MG/1
40 CAPSULE, DELAYED RELEASE ORAL
Qty: 30 CAPSULE | Refills: 11 | Status: SHIPPED | OUTPATIENT
Start: 2025-02-24 | End: 2026-02-24

## 2025-02-24 ASSESSMENT — ENCOUNTER SYMPTOMS
OCCASIONAL FEELINGS OF UNSTEADINESS: 1
LOSS OF SENSATION IN FEET: 1
DEPRESSION: 0

## 2025-02-24 ASSESSMENT — PAIN SCALES - GENERAL: PAINLEVEL_OUTOF10: 0-NO PAIN

## 2025-02-24 NOTE — PROGRESS NOTES
Pediatric Gastroenterology Follow Up Office Visit      HPI  Jose Fernandezand his mother were seen in the [facility]ology [facility] in follow-up on 2/24/2025. Jose Fernandez is a 24 y.o. year-old  who is being followed by Pediatric Gastroenterology for neurogenic bowel . 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  Today he is accompanied by his mother.  He is here for his annual visit.  Clinical Status - Good    Now to Dr. Vargas (Adult urology)  Has Mitrofanoff.   He gets Botox with Dr. Vargas which has been effective.  He has had more urinary tract infections despite prophylaxis.    He is scheduled for an upcoming procedure to replace the baclofen pump.  Whenever he has a sedated procedure he does get more constipated.    Since our last encounter he had 1 bout of diarrhea after being on Keflex for a urinary tract infection.  It took him a long time to recover, he needed extra probiotics and extra hydration (at home) because of all the diarrhea.  He is now recovered.    He continues to do well with his anal irrigation system.  He has been using this for several years with great success.  Peristeen anal irrigation system:   Irrigation 1000 tap water plus MiraLAX   Takes Colace 100 - 200 mg by mouth daily.   Takes MiraLAX once a day.     Outside of the episode mentioned above, he denies nausea, vomiting, reflux and regurgitation. Has a Nissen. Has been on Pepcid for several years (and Zantac prior) twice a day with good relief.      Works at [employer] but not involved in sports right now. Eats fast food, eats quickly. knows that he needs to make some changes.  He has gained some weight.  I offered for him to see our registered dietitian but he is not interested at this time.    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 Inhale one vial every 4-6 hours as needed for cough, wheezing and shortness of breath 75 mL 3    budesonide-formoteroL (Symbicort) 160-4.5 mcg/actuation inhaler Inhale 2 puffs 2 times a day. Rinse mouth with water after use to reduce aftertaste and incidence of candidiasis. Do not swallow. 10.2 g 3    cephalexin (Keflex) 500 mg capsule Take 1 capsule (500 mg) by mouth 4 times a day for 10 days. 40 capsule 0    cholecalciferol (Vitamin D-3) 50 mcg (2,000 unit) capsule TAKE 1 CAPSULE Daily      famotidine (Pepcid) 40 mg tablet TAKE ONE TABLET BY MOUTH TWO TIMES A DAY 60 tablet 0    ferrous sulfate ER (Slow Fe) 142 mg ER tablet Take 1 tablet by mouth once daily.      L. ACIDOPHILUS/BIFID. ANIMALIS ORAL Take 1 capsule by mouth once daily.      mirabegron (Myrbetriq) 50 mg tablet extended release 24 hr 24 hr tablet Take 1 tablet (50 mg) by mouth once daily. 30 tablet 11    mometasone (Asmanex Twisthaler) 220 mcg/ actuation (14) inhaler Inhale 2 puffs once daily as needed. Rinse mouth with water after use to reduce aftertaste and incidence of candidiasis. Do not swallow. (Patient not taking: Reported on 2/21/2025)      montelukast (Singulair) 10 mg tablet Take 1 tablet (10 mg) by mouth once daily. 30 tablet 11    oxybutynin XL (Ditropan-XL) 15 mg 24 hr tablet Take 1 tablet (15 mg) by mouth 2 times a day. 60 tablet 11     Current Facility-Administered Medications on File Prior to Visit   Medication Dose Route Frequency Provider Last Rate Last Admin    baclofen (Lioresal) 2,000  mcg/mL intrathecal injection  300 mcg/day intrathecal Continuous Yoko Riley MD 0.006 mL/hr at 06/27/24 1125 300 mcg/day at 06/27/24 1125    baclofen (Lioresal) 2,000 mcg/mL intrathecal injection  300 mcg/day intrathecal Continuous Yoko Riley MD 0.006 mL/hr at 11/21/24 0942 300 mcg/day at 11/21/24 0942    baclofen (Lioresal) intrathecal injection  100 mcg/day intrathecal Continuous Yoko Riley MD 0.002 mL/hr at 01/11/24 1101 100 mcg/day at 01/11/24 1101    onabotulinumtoxinA (Botox) injection 200 Units  200 Units intravesical Once Adam Vargas MD               PHYSICAL EXAMINATION:  Vital signs : /72   Pulse 60   Temp 36.9 °C (98.4 °F) (Temporal)   Resp 20   Wt 103 kg (226 lb 6.6 oz) Comment: Wheelchair weight = 32.3KG  BMI 33.44 kg/m²    Facility age limit for growth %jessica is 20 years.    Physical Exam  Constitutional:       General: Appear well. Comfortable in wheelchair  HENT:      Head: Normocephalic.      Right Ear: External ear normal.      Left Ear: External ear normal.      Nose: Nose normal.      Mouth/Throat:      Mouth: Mucous membranes are moist.   Eyes:      Extraocular Movements: Extraocular movements intact.      Conjunctiva/sclera: Conjunctivae normal.   Cardiovascular:      Rate and Rhythm: Normal rate and regular rhythm.   Pulmonary:      Effort: Respiratory effort is normal.      Breath sounds: Normal breath sounds.   Abdominal:      General: Abdomen is flat. Bowel sounds are normal. There is no distension. There are no masses. Several scars     Palpations: Abdomen is soft.      Tenderness: There is no abdominal tenderness.      Gastrostomy tubes: N/A  Anal Rectal:     Not examined   Skin     General: Skin is warm and dry.      No rashes  Neurological:      Mental Status: Alert  Psychiatric:         Mood and Affect: Mood normal.        IMPRESSION AND PLAN:  Jose Fernandez is an 24y, almost 25 year old male with lower body paralysis, neurogenic bowel.    He is followed by GI fro chronic Bowel Management with Peristeen which he has been doing well with for several years.     If needed:  Mix  In MirALX 1-2 caps with the irrigant.   Convert to NS irrigation for a couple days.   Increase the colace from 100 - 400 mg     Continue Famotidine for Acid Reflux but it is not cutting it right now.   Omeprazole 40 mg once a day   Can stop Pepcid while on omeprazole. If need pepcid in the evening, ok to use as needed     Will work on factors that contribute to weight gain.       Follow up in one  year     CONTACT:  Division of Pediatric Gastroenterology, Hepatology and Nutrition  All results will be on line on My Chart.  Make sure sure you have signed up for My Chart.     Office phone   Office fax   Email [email or web address]     Please note:  After hours and on call [phone] and ask for Pediatric Gastroenterology Fellow on Call  Office visit Scheduling   Radiology Scheduling      I am in clinic M, T, W and may not be able to return call until Thursday.   Phone calls and email to our office are returned by one of our nurses within 48 business hours.  Please call for prescription renewals when you have one week of medication remaining.   Please call if you have trouble with insurance company coverage of any medications we prescribe.

## 2025-02-24 NOTE — PATIENT INSTRUCTIONS
Jose Fernandez is an 24y, almost 25 year old male with lower body paralysis, neurogenic bowel.   He is followed by GI fro chronic Bowel Management with Peristeen which he has been doing well with for several years.     If needed:  Mix  In MirALX 1-2 caps with the irrigant.   Convert to NS irrigation for a couple days.   Increase the colace from 100 - 400 mg     Continue Famotidine for Acid Reflux but it is not cutting it right now.   Omeprazole 40 mg once a day   Can stop Pepcid while on omeprazole. If need pepcid in the evening, ok to use as needed     Will work on factors that contribute to weight gain.       Follow up in one  year     CONTACT:  Division of Pediatric Gastroenterology, Hepatology and Nutrition  All results will be on line on My Chart.  Make sure sure you have signed up for My Chart.     Office phone   Office fax   Email [email or web address]     Please note:  After hours and on call [phone] and ask for Pediatric Gastroenterology Fellow on Call  Office visit Scheduling   Radiology Scheduling      I am in clinic M, T, W and may not be able to return call until Thursday.   Phone calls and email to our office are returned by one of our nurses within 48 business hours.  Please call for prescription renewals when you have one week of medication remaining.   Please call if you have trouble with insurance company coverage of any medications we prescribe.

## 2025-02-25 ENCOUNTER — TELEPHONE (OUTPATIENT)
Dept: PEDIATRIC GASTROENTEROLOGY | Facility: HOSPITAL | Age: 25
End: 2025-02-25

## 2025-02-25 NOTE — TELEPHONE ENCOUNTER
Requested Corry De La O to sign note from 2/24 -  We need to send to [facility] for supplies.  FAX: [phone]

## 2025-03-05 ENCOUNTER — CLINICAL SUPPORT (OUTPATIENT)
Dept: PREADMISSION TESTING | Facility: HOSPITAL | Age: 25
End: 2025-03-05
Payer: COMMERCIAL

## 2025-03-05 DIAGNOSIS — Z41.9 SURGERY, ELECTIVE: Primary | ICD-10-CM

## 2025-03-05 RX ORDER — CHLORHEXIDINE GLUCONATE 40 MG/ML
1 SOLUTION TOPICAL DAILY
Qty: 25 ML | Refills: 0 | Status: SHIPPED | OUTPATIENT
Start: 2025-03-05 | End: 2025-03-11

## 2025-03-05 RX ORDER — DOCUSATE SODIUM 250 MG
CAPSULE ORAL DAILY
COMMUNITY

## 2025-03-05 RX ORDER — CHLORHEXIDINE GLUCONATE ORAL RINSE 1.2 MG/ML
15 SOLUTION DENTAL DAILY
Qty: 30 ML | Refills: 0 | Status: SHIPPED | OUTPATIENT
Start: 2025-03-05 | End: 2025-03-11 | Stop reason: HOSPADM

## 2025-03-05 ASSESSMENT — ENCOUNTER SYMPTOMS
MUSCULOSKELETAL NEGATIVE: 1
EYES NEGATIVE: 1
NECK NEGATIVE: 1
CONSTITUTIONAL NEGATIVE: 1
NEUROLOGICAL NEGATIVE: 1
RESPIRATORY NEGATIVE: 1
GASTROINTESTINAL NEGATIVE: 1
CARDIOVASCULAR NEGATIVE: 1

## 2025-03-05 NOTE — PREPROCEDURE INSTRUCTIONS
Dr. Riley has order lab work for you.  Please have this completed before your surgery at the nearest [facility] location.    This summary includes instructions and information to aid you during your perioperative period.  Please read carefully. If you have any questions about your visit today, please call the number listed above.  If you become ill or have any changes to your health before your surgery, please contact your primary care provider and alert your surgeon.    Preparing for your Surgery       Exercises  Preoperative Deep Breathing Exercises  Why it is important to do deep breathing exercises before my surgery?  Deep breathing exercises strengthen your breathing muscles.  This helps you to recover after your surgery and decreases the chance of breathing complications.  How are the deep breathing exercises done?  Sit straight with your back supported.  Breathe in deeply and slowly through your nose. Your lower rib cage should expand and your abdomen may move forward.  Hold that breath for 3 to 5 seconds.  Breathe out through pursed lips, slowly and completely.  Rest and repeat 10 times every hour while awake.  Rest longer if you become dizzy or lightheaded.      Instructions    Preoperative Fasting Guidelines    Why must I stop eating and drinking near surgery time?  With sedation, food or liquid in your stomach can enter your lungs causing serious complications  Food can increase nausea and vomiting  When do I need to stop eating and drinking before my surgery?      Do not eat any food after midnight the night before your surgery/procedure. You may have up to 13.5 ounces of clear liquid until TWO hours before your instructed arrival time to the hospital.  This includes water, black tea/coffee, (no milk or cream) apple juice, and electrolyte drinks (Gatorade). You may chew gum until TWO hours before your surgery/procedure            Simple things you can do to help prevent blood clots      Blood clots are blockages that can form in the body's veins. When a blood clot forms in your deep veins, it may be called a deep vein thrombosis, or DVT for short. Blood clots can happen in any part of the body where blood flows, but they are most common in the arms and legs. If a piece of a blood clot breaks free and travels to the lungs, it is called a pulmonary embolus (PE). A PE can be a very serious problem.         Being in the hospital or having surgery can raise your chances of getting a blood clot because you may not be well enough to move around as much as you normally do.         Ways you can help prevent blood clots in the hospital       Wearing SCDs  SCDs stands for Sequential Compression Devices.   SCDs are special sleeves that wrap around your legs. They attach to a pump that fills them with air to gently squeeze your legs every few minutes.  This helps return the blood in your legs to your heart.   SCDs should only be taken off when walking or bathing. SCDs may not be comfortable, but they can help save your life.              Pump SCD leg sleeves  Wearing compression stockings - if your doctor orders them. These special snug-fitting stockings gently squeeze your legs to help blood flow.       Walking. Walking helps move the blood in your legs.   If your doctor says it is ok, try walking the halls at least   5 times a day. Ask us to help you get up, so you don't fall.      Taking any blood-thinning medicines your doctor orders.              Ways you can help prevent blood clots at home         Wearing compression stockings - if your doctor orders them.   Walking - to help move the blood in your legs.    Taking any blood-thinning medicines your doctor orders.      Signs of a blood clot or PE    Tell your doctor or nurse right away if you have any of the problems listed below.         If you are at home, seek medical care right away. Call 911 for chest pain or problems breathing.            Signs of  a blood clot (DVT) - such as pain, swelling, redness, or warmth in your arm or legs.  Signs of a pulmonary embolism (PE) - such as chest pain or feeling short of breath      Tobacco and Alcohol;  Do not drink alcohol or smoke within 24 hours of surgery.  It is best to quit smoking for as long as possible before any surgery or procedure.     Other Instructions  Body Wash; What is a home preoperative antibacterial shower? This shower is a way of cleaning the skin with a germ-killing solution before surgery.  The solution contains chlorhexidine, commonly known as CHG.  CHG is a skin cleanser with germ-killing ability.  Let your doctor know if you are allergic to chlorhexidine. Why do I need to take a preoperative antibacterial shower? Skin is not sterile.  It is best to try to make your skin as free of germs as possible before surgery.  Proper cleansing with a germ-killing soap before surgery can lower the number of germs on your skin.  This helps to reduce the risk of infection at the surgical site.  Following the instructions listed below will help you prepare your skin for surgery.   How do I use the solution? Steps:  Begin using your CHG soap 5 days before your scheduled surgery on ___________.   First, wash and rinse your hair using the CHG soap. Keep CHG soap away from ear canals and eyes.  Rinse completely, do not condition.  Hair extensions should be removed. , Oral/Dental Rinse: What is oral/dental rinse?  It is a mouthwash. It is a way of cleaning the mouth with a germ-killing solution before your surgery.  The solution contains chlorhexidine, commonly known as CHG. It is used inside the mouth to kill a bacteria known as Staphylococcus aureus.  Let your doctor know if you are allergic to Chlorhexidine. Why do I need to use CHG oral/dental rinse? The CHG oral/dental rinse helps to kill a bacteria in your mouth known as Staphylococcus aureus.  This reduces the risk of infection at the surgical site.  Using your  CHG oral/dental rinse STEPS: Use your CHG oral/dental rinse after you brush your teeth the night before (at bedtime) and the morning of your surgery.  Follow all directions on your prescription label.  Use the cap on the container to measure 15 ml.  Swish (gargle if you can) the mouthwash in your mouth for at least 30 seconds, (do not swallow) and spit out.  After you use your CHG rinse, do not rinse your mouth with water, drink or eat.  Please refer to the prescription label for the appropriate time to resume oral intake What side effects might I have using the CHG oral/dental rinse? CHG rinse will stick to plaque on the teeth.  Brush and floss just before use.  Teeth brushing will help avoid staining of plaque during use.       The Week before Surgery        Seven days before Surgery  Check your [facility] medication instructions  Do the exercises provided to you by [facility]   Arrange for a responsible, adult licensed  to take you home after surgery and stay with you for 24 hours.  You will not be permitted to drive yourself home if you have received any anesthetic/sedation  Six days before surgery  Check your [facility] medication instructions  Do the exercises provided to you by [facility]   Start using Chlorhexidene (CHG) body wash if prescribed  Five days before surgery  Check your [facility] medication instructions  Do the exercises provided to you by [facility]   Continue to use CHG body wash if prescribed  Three days before surgery  Check your [facility] medication instructions  Do the exercises provided to you by [facility]   Continue to use CHG body wash if prescribed  Two days before surgery  Check your [facility] medication instructions  Do the exercises provided to you by [facility]   Continue to use CHG body wash if prescribed    The Day before Surgery       Check your [facility] medication and all other [facility] instructions including when to stop eating and drinking  You will be called with your arrival time for surgery in the late afternoon.  If you do not receive a call  please reach out to your surgeon's office.  Do not smoke or drink 24 hours before surgery  Prepare items to bring with you to the hospital  Shower with your chlorhexidine wash if prescribed  Brush your teeth and use your chlorhexidine dental rinse if prescribed    The Day of Surgery       Check your [facility] medication instructions  Ensure you follow the instructions for when to stop eating and drinking  Shower, if prescribed use CHG.  Do not apply any lotions, creams, moisturizers, perfume or deodorant  Brush your teeth and use your CHG dental rinse if prescribed  Wear loose comfortable clothing  Avoid make-up  Remove  jewelry and piercings, consider professional piercing removal with a plastic spacer if needed  Bring photo ID and Insurance card  Bring an accurate medication list that includes medication dose, frequency and allergies  Bring a copy of your advanced directives (will, health care power of )  Bring any devices and controllers as well as medical devices you have been provided with for surgery (CPAP, slings, braces, etc.)  Dentures, eyeglasses, and contacts will be removed before surgery, please bring cases for contacts or glasses

## 2025-03-05 NOTE — CPM/PAT NURSE NOTE
CPM/PAT Nurse Note      Name: Jose Fernandez (Jose Fernandez)  /Age: 2000/25 y.o.       Past Medical History:   Diagnosis Date    Abnormal results of pulmonary function studies 2019    Abnormal PFT    Acute infarction of spinal cord (embolic) (nonembolic) (Multi) 2020    intrauterine hemorrhage and subsequent cord infarction    Asthma     last seen by Lyric Cameron 2023    GERD (gastroesophageal reflux disease)     s/p Nissen    Hemiplegia (Multi)     History of transfusion     Immunocompromised     Intra-abdominal abscess (Multi)     recurrent    Mitrofanoff appendicovesicostomy present (Multi)     MSSA (methicillin susceptible Staphylococcus aureus)     MSSA R gluteal abscess 2023    Myelomalacia (Multi)     Neurogenic bladder     Neurogenic bowel     Other conditions influencing health status 2013    Drug-induced Myelopathy    Other conditions influencing health status 2022    Abscess, abdomen    Pectus excavatum 2013    Pectus excavatum    Peritoneal abscess (Multi) 2021    Peritonitis with abscess of intestine    Peritonitis     Personal history of diseases of the skin and subcutaneous tissue 2019    History of dermatitis    Personal history of diseases of the skin and subcutaneous tissue     History of eczema    Personal history of other diseases of the digestive system 2020    History of chronic constipation    Personal history of other infectious and parasitic diseases 2019    History of onychomycosis    Personal history of other infectious and parasitic diseases 2018    History of wound infection    Reactive airway disease ([facility])     Recurrent UTI (urinary tract infection)     s/p Mitrofanoff    Respiratory insufficiency     BiPap supplementation at night-has not used in 10 years    Restrictive lung disease     Scoliosis, unspecified     Kyphoscoliosis    Snoring     Snoring    Tinea pedis 2019    Tinea pedis of both  feet    Urinary tract infection     Recurrent UTI's S/p mitrofanoff with Dr. Myles.    UTI (urinary tract infection)     current    Xerosis cutis 07/23/2019    Xerosis of skin       Past Surgical History:   Procedure Laterality Date    ACHILLES TENDON SURGERY  10/30/2013    Subcutaneous Tenotomy Achilles Tendon Under Gen Anesthesia    BACLOFEN PUMP IMPLANTATION  2019    BRONCHOSCOPY      CYSTOSCOPY  12/12/2024    GASTROSTOMY  10/30/2013    Gastric Surgery Feeding Gastrostomy s/p removal    MITROFANOFF PROCEDURE  08/06/2018    NISSEN FUNDOPLICATION      2003    OTHER SURGICAL HISTORY  10/30/2013    Direct Laryngoscopy    OTHER SURGICAL HISTORY  10/30/2013    Closed Treatment Fx Of Proximal Femoral Neck W/ Manipulation    OTHER SURGICAL HISTORY  04/16/2015    Complex Repair Of Wound Trunk 2.6 To 7.5 Cm    OTHER SURGICAL HISTORY  10/14/2022    Circumcision    SPINAL FUSION      x2 2015, 2017    TRACHEOSTOMY TUBE PLACEMENT      s/p decannulation 2005    US GUIDED PERCUTANEOUS PERITONEAL OR RETROPERITONEAL FLUID COLLECTION DRAINAGE  04/08/2021    US GUIDED PERCUTANEOUS PERITONEAL OR RETROPERITONEAL FLUID COLLECTION DRAINAGE 4/8/2021 [facility]       Patient Sexual activity questions deferred to the physician.    Family History   Problem Relation Name Age of Onset    Diabetes Maternal Grandfather         Allergies   Allergen Reactions    Ciprofloxacin Anaphylaxis     Has taken as an adult, anaphylaxis as an infant    Sulfamethoxazole-Trimethoprim Anaphylaxis    Sutures Other     Patients mother states patient reacts to dissolveable suture     Fish Containing Products Hives    Shellfish Containing Products Hives    Vancomycin Other     Red Man's syndrome        Prior to Admission medications    Medication Sig Start Date End Date Taking? Authorizing Provider   albuterol 2.5 mg /3 mL (0.083 %) nebulizer solution Take 3 mL (2.5 mg) by nebulization every 4 hours if needed for wheezing. Inhale one vial every 4-6 hours  as needed for cough, wheezing and shortness of breath  Patient taking differently: Take 3 mL (2.5 mg) by nebulization once daily. Inhale one vial every 4-6 hours as needed for cough, wheezing and shortness of breath 1/27/25 2/26/25  PATRICK Youssef   budesonide-formoteroL (Symbicort) 160-4.5 mcg/actuation inhaler Inhale 2 puffs 2 times a day. Rinse mouth with water after use to reduce aftertaste and incidence of candidiasis. Do not swallow.  Patient taking differently: Inhale 2 puffs if needed. Rinse mouth with water after use to reduce aftertaste and incidence of candidiasis. Do not swallow. 7/24/24 12/12/24  PATRICK Youssef   chlorhexidine (Hibiclens) 4 % external liquid Apply 1 Application topically once daily for 5 days. Use per CPM/PAT provided instructions 3/5/25 3/10/25  Yuly Silva MD   chlorhexidine (Peridex) 0.12 % solution Use 15 mL in the mouth or throat once daily for 2 doses. 3/5/25 3/7/25  Yuly Silva MD   cholecalciferol (Vitamin D-3) 50 mcg (2,000 unit) capsule TAKE 1 CAPSULE Daily 6/17/22   Historical Provider, MD   docusate sodium 250 mg capsule Take by mouth once daily.    Historical Provider, MD   famotidine (Pepcid) 40 mg tablet Take 1 tablet (40 mg) by mouth 2 times a day. 2/24/25   PATRICK Weir   ferrous sulfate ER (Slow Fe) 142 mg ER tablet Take 1 tablet by mouth once daily. 2/5/19   Historical Provider, MD   L. ACIDOPHILUS/BIFID. ANIMALIS ORAL Take 1 capsule by mouth once daily.    Historical Provider, MD   mirabegron (Myrbetriq) 50 mg tablet extended release 24 hr 24 hr tablet Take 1 tablet (50 mg) by mouth once daily. 1/22/25   Adam Vargas MD   mometasone (Asmanex Twisthaler) 220 mcg/ actuation (14) inhaler Inhale 2 puffs once daily as needed. Rinse mouth with water after use to reduce aftertaste and incidence of candidiasis. Do not swallow.  Patient not taking: Reported on 2/21/2025    Historical Provider, MD   montelukast  (Singulair) 10 mg tablet Take 1 tablet (10 mg) by mouth once daily. 7/24/24 2/21/25  PATRICK Youssef   omeprazole (PriLOSEC) 40 mg DR capsule Take 1 capsule (40 mg) by mouth once daily in the morning. Take before meals. Do not crush or chew. 2/24/25 2/24/26  PATRICK Weir   oxybutynin XL (Ditropan-XL) 15 mg 24 hr tablet Take 1 tablet (15 mg) by mouth 2 times a day. 10/9/24   Adam Vargas MD   polyethylene glycol (Miralax) 17 gram/dose powder Mix 17 g of powder and drink 2 times a day before meals. 2/24/25 2/24/26  PATRICK Weir        PAT ROS:   Constitutional:   neg    Neuro/Psych:   neg    Eyes:   neg    Ears:   neg    Nose:   neg    Mouth:   neg    Throat:   neg    Neck:   neg    Cardio:   neg    Respiratory:   neg    Endocrine:   GI:   neg    :    Recurrent UTIs  Musculoskeletal:   neg    Hematologic:    history of blood transfusion  Skin:  neg        DASI Risk Score      Flowsheet Row Pre-Admission Testing from 12/9/2024 in [facility] Clinical Support from 10/12/2023 in [facility]   Can you take care of yourself (eat, dress, bathe, or use toilet)?  2.75 filed at 12/09/2024 0754 2.75 filed at 10/12/2023 0918   Can you walk indoors, such as around your house? 0 filed at 12/09/2024 0754 1.75 filed at 10/12/2023 0918   Can you walk a block or two on level ground?  0 filed at 12/09/2024 0754 2.75 filed at 10/12/2023 0918   Can you climb a flight of stairs or walk up a hill? 0 filed at 12/09/2024 0754 5.5 filed at 10/12/2023 0918   Can you run a short distance? 0 filed at 12/09/2024 0754 8 filed at 10/12/2023 0918   Can you do light work around the house like dusting or washing dishes? 2.7 filed at 12/09/2024 0754 0 filed at 10/12/2023 0918   Can you do moderate work around the house like vacuuming, sweeping floors or carrying groceries? 0 filed at 12/09/2024 0754 0 filed at 10/12/2023 0918   Can you do heavy work around the  house like scrubbing floors or lifting and moving heavy furniture?  0 filed at 12/09/2024 0754 0 filed at 10/12/2023 0918   Can you do yard work like raking leaves, weeding or pushing a mower? 0 filed at 12/09/2024 0754 0 filed at 10/12/2023 0918   Can you have sexual relations? 0 filed at 12/09/2024 0754 5.25 filed at 10/12/2023 0918   Can you participate in moderate recreational activities like golf, bowling, dancing, doubles tennis or throwing a baseball or football? 6 filed at 12/09/2024 0754 6 filed at 10/12/2023 0918   Can you participate in strenous sports like swimming, singles tennis, football, basketball, or skiing? 7.5 filed at 12/09/2024 0754 7.5 filed at 10/12/2023 0918   DASI SCORE 18.95 filed at 12/09/2024 0754 39.5 filed at 10/12/2023 0918   METS Score (Will be calculated only when all the questions are answered) 5.1 filed at 12/09/2024 0754 7.6 filed at 10/12/2023 0918          Caprini DVT Assessment      Flowsheet Row Pre-Admission Testing from 12/9/2024 in [facility] Clinical Support from 10/12/2023 in [facility]   DVT Score (IF A SCORE IS NOT CALCULATING, MUST SELECT A BMI TO COMPLETE) 2 filed at 12/09/2024 0838 4 filed at 10/12/2023 0934   Surgical Factors Minor surgery planned filed at 12/09/2024 0838 --   BMI (BMI MUST BE CHOSEN) 30 or less filed at 12/09/2024 0838 30 or less filed at 10/12/2023 0934   RETIRED: Current Status -- Major surgery planned, lasting 2-3 hours filed at 10/12/2023 0934   RETIRED: History -- Prior major surgery filed at 10/12/2023 0934   RETIRED: Age -- Less than 40 years filed at 10/12/2023 0934          Modified Frailty Index      Flowsheet Row Clinical Support from 10/12/2023 in [facility]   Non-independent functional status (problems with dressing, bathing, personal grooming, or cooking) 0.0909 filed at 10/12/2023 0941   History of diabetes mellitus  0 filed at 10/12/2023 0943   History of COPD 0 filed at 10/12/2023  0943   History of CHF No filed at 10/12/2023 0943   History of MI 0 filed at 10/12/2023 0943   History of Percutaneous Coronary Intervention, Cardiac Surgery, or Angina No filed at 10/12/2023 0943   Hypertension requiring the use of medication  0 filed at 10/12/2023 0943   Peripheral vascular disease 0 filed at 10/12/2023 0943   Impaired sensorium (cognitive impairement or loss, clouding, or delirium) 0 filed at 10/12/2023 0943   TIA or CVA withouy residual deficit 0 filed at 10/12/2023 0943   Cerebrovascular accident with deficit 0.0909 filed at 10/12/2023 0943   Modified Frailty Index Calculator .0909 filed at 10/12/2023 0941          [id number] Stroke Risk Points  Current as of just now        N/A 0 to 9 Points:      Last Change: N/A          The [id number] risk score (Lip JOSH, et al. 2009. © 2010 [employer]) quantifies the risk of stroke for a patient with atrial fibrillation. For patients without atrial fibrillation or under the age of 18 this score appears as N/A. Higher score values generally indicate higher risk of stroke.        This score is not applicable to this patient. Components are not calculated.          Revised Cardiac Risk Index      Flowsheet Row Pre-Admission Testing from 12/9/2024 in [facility] Clinical Support from 10/12/2023 in [facility]   High-Risk Surgery (Intraperitoneal, Intrathoracic,Suprainguinal vascular) 0 filed at 12/09/2024 0843 0 filed at 10/12/2023 0941   History of ischemic heart disease (History of MI, History of positive exercuse test, Current chest paint considered due to myocardial ischemia, Use of nitrate therapy, ECG with pathological Q Waves) 0 filed at 12/09/2024 0843 0 filed at 10/12/2023 0941   History of congestive heart failure (pulmonary edemia, bilateral rales or S3 gallop, Paroxysmal nocturnal dyspnea, CXR showing pulmonary vascular redistribution) 0 filed at 12/09/2024 0843 0 filed at 10/12/2023 0941    History of cerebrovascular disease (Prior TIA or stroke) 1 filed at 12/09/2024 0843 0 filed at 10/12/2023 0941   Pre-operative insulin treatment 0 filed at 12/09/2024 0843 0 filed at 10/12/2023 0941   Pre-operative creatinine>2 mg/dl 0 filed at 12/09/2024 0843 0 filed at 10/12/2023 0941   Revised Cardiac Risk Calculator 1 filed at 12/09/2024 0843 0 filed at 10/12/2023 0941          Apfel Simplified Score      Flowsheet Row Pre-Admission Testing from 12/9/2024 in [facility] Clinical Support from 10/12/2023 in [facility]   Smoking status 1 filed at 12/09/2024 0837 1 filed at 10/12/2023 0943   History of motion sickness or PONV  0 filed at 12/09/2024 0837 0 filed at 10/12/2023 0943   Use of postoperative opioids 0 filed at 12/09/2024 0837 1 filed at 10/12/2023 0943   Gender - Female 0=No filed at 12/09/2024 0837 --   Apfel Simplified Score Calculator 1 filed at 12/09/2024 0837 2 filed at 10/12/2023 0943          Risk Analysis Index Results This Encounter    No data found in the last 10 encounters.       Stop Bang Score      Flowsheet Row Pre-Admission Testing from 12/9/2024 in [facility] Clinical Support from 10/12/2023 in [facility]   Do you snore loudly? 0 filed at 12/09/2024 0753 1 filed at 10/12/2023 0918   Do you often feel tired or fatigued after your sleep? 0 filed at 12/09/2024 0753 0 filed at 10/12/2023 0918   Has anyone ever observed you stop breathing in your sleep? 0 filed at 12/09/2024 0753 0 filed at 10/12/2023 0918   Do you have or are you being treated for high blood pressure? 0 filed at 12/09/2024 0753 0 filed at 10/12/2023 0918   Recent BMI (Calculated) 28.8 filed at 12/09/2024 0753 28.1 filed at 10/12/2023 0918   Is BMI greater than 35 kg/m2? 0=No filed at 12/09/2024 0753 0=No filed at 10/12/2023 0918   Age older than 50 years old? 0=No filed at 12/09/2024 0753 0=No filed at 10/12/2023 0918   Is your neck circumference greater than  17 inches (Male) or 16 inches (Female)? 0 filed at 12/09/2024 0753 --   Gender - Male 1=Yes filed at 12/09/2024 0753 --   STOP-BANG Total Score 1 filed at 12/09/2024 0753 --          Prodigy: High Risk  Total Score: 8              Prodigy Gender Score          ARISCAT Score for Postoperative Pulmonary Complications      Flowsheet Row Pre-Admission Testing from 12/9/2024 in [facility]   Age Calculated Score 0 filed at 12/09/2024 1315   Preoperative SpO2 8 filed at 12/09/2024 1315   Respiratory infection in the last month Either upper or lower (i.e., URI, bronchitis, pneumonia), with fever and antibiotic treatment 0 filed at 12/09/2024 1315   Preoperative anemia (Hgb less than 10 g/dl) 0 filed at 12/09/2024 1315   Surgical incision  0 filed at 12/09/2024 1315   Duration of surgery  16 filed at 12/09/2024 1315   Emergency Procedure  0 filed at 12/09/2024 1315   ARISCAT Total Score  24 filed at 12/09/2024 1315          Schmidt Perioperative Risk for Myocardial Infarction or Cardiac Arrest (BJ)      Flowsheet Row Pre-Admission Testing from 12/9/2024 in [facility]   Calculated Age Score 0.44 filed at 12/09/2024 0843   Functional Status  0.65 filed at 12/09/2024 0843   ASA Class  -3.29 filed at 12/09/2024 0843   Creatinine 0 filed at 12/09/2024 0843   Type of Procedure  -0.26 filed at 12/09/2024 0843   BJ Total Score  -7.71 filed at 12/09/2024 0843   BJ % 0.04 filed at 12/09/2024 0843        Jose Fernandez is scheduled for Baclofen pump replacement with 40 mL pump with Dr. Riley on 3/11/25.  PCP - Troy Chiang, DO - annual exam 5/22/24     Ped GI; Corry Mejia, APRN-CNP 2/24/25 FUV for neurogenic bowel. He is followed by GI fro chronic Bowel Management with Perist[facility] which he has been doing well with for several years.     Pulm: Griselda Shea, IRINA-CNP 1/27/25 presents to a [facility] for an evaluation with concerns of  reactive airway disease.  Reactive airway disease  - albuterol hfa 2 puffs or albuterol nebs every 4-6 hours as needed  - cont symbicort as needed  - has acapella device he uses when he is sick  - bipap at night when he is sick - not used in last 6 months   Allergy medications prescribed/continued today include: nasal spray as needed, Zyrtec/cetirizine as need, Singulair/montelukast daily       pulmonary function test and FENO 16 ---Spirometry shows no obstruction. There is no significant bronchodilator response. Lung volumes indicate a moderate restrictive defect. There is hyperinflation present. The DLCO corrected for hemoglobin is mildly reduced.      Ortho Surg : Yoko Riley MD 11/21/24  PMHx C5-C7 incomplete quadriplegia d/t intrauterine hemorrhage with resultant spasticity s/p baclofen pump placement. Today we interrogated, refilled, and reprogrammed the ITB pump. He is in need of his pump replacement in the next few months, so we are going to plan to do that in March.     Urology - 10/9/24 - dAam Vargas MD - neurogenic bladder, recurrent UTIs s/p mitrofanoff, recurrent intra-abdominal abscess, now resolving with improved catheter timing and technique       PFT; 5/16/24  Spirometry shows no obstruction. There is no significant bronchodilator response. Lung volumes indicate a moderate restrictive defect. There is hyperinflation present. The DLCO corrected for hemoglobin is mildly reduced.       ECG; 8/9/23  Sinus tachycardia  Otherwise normal ECG  No previous ECGs available  Confirmed by Jose A Resendiz ([id number]) on 8/9/2023 6:18:53 PM    Nurse Plan of Action:   Telephone call completed with the patient and his mother.    ONLY    Rosie Toledo RN  Pre-Admission Testing

## 2025-03-06 DIAGNOSIS — G95.11 SPINAL CORD INFARCTION (MULTI): ICD-10-CM

## 2025-03-06 DIAGNOSIS — G82.50 QUADRIPLEGIA AND QUADRIPARESIS (MULTI): Primary | ICD-10-CM

## 2025-03-07 RX ORDER — MUPIROCIN 20 MG/G
OINTMENT TOPICAL 2 TIMES DAILY
Qty: 15 G | Refills: 0 | Status: SHIPPED | OUTPATIENT
Start: 2025-03-07 | End: 2025-03-12

## 2025-03-08 LAB
ERYTHROCYTE [DISTWIDTH] IN BLOOD BY AUTOMATED COUNT: 12.2 % (ref 11–15)
HCT VFR BLD AUTO: 45.1 % (ref 38.5–50)
HGB BLD-MCNC: 15.1 G/DL (ref 13.2–17.1)
MCH RBC QN AUTO: 29.8 PG (ref 27–33)
MCHC RBC AUTO-ENTMCNC: 33.5 G/DL (ref 32–36)
MCV RBC AUTO: 89.1 FL (ref 80–100)
PLATELET # BLD AUTO: 309 THOUSAND/UL (ref 140–400)
PMV BLD REES-ECKER: 10.8 FL (ref 7.5–12.5)
RBC # BLD AUTO: 5.06 MILLION/UL (ref 4.2–5.8)
WBC # BLD AUTO: 7.3 THOUSAND/UL (ref 3.8–10.8)

## 2025-03-10 ENCOUNTER — ANESTHESIA EVENT (OUTPATIENT)
Dept: OPERATING ROOM | Facility: HOSPITAL | Age: 25
End: 2025-03-10
Payer: COMMERCIAL

## 2025-03-11 ENCOUNTER — APPOINTMENT (OUTPATIENT)
Dept: INFECTIOUS DISEASES | Facility: CLINIC | Age: 25
End: 2025-03-11
Payer: COMMERCIAL

## 2025-03-11 ENCOUNTER — ANESTHESIA (OUTPATIENT)
Dept: OPERATING ROOM | Facility: HOSPITAL | Age: 25
End: 2025-03-11
Payer: COMMERCIAL

## 2025-03-11 ENCOUNTER — HOSPITAL ENCOUNTER (OUTPATIENT)
Facility: HOSPITAL | Age: 25
Setting detail: OUTPATIENT SURGERY
Discharge: HOME | End: 2025-03-11
Attending: ORTHOPAEDIC SURGERY | Admitting: ORTHOPAEDIC SURGERY
Payer: COMMERCIAL

## 2025-03-11 VITALS
SYSTOLIC BLOOD PRESSURE: 127 MMHG | DIASTOLIC BLOOD PRESSURE: 62 MMHG | TEMPERATURE: 97.2 F | WEIGHT: 226.41 LBS | HEIGHT: 69 IN | HEART RATE: 70 BPM | BODY MASS INDEX: 33.53 KG/M2 | RESPIRATION RATE: 14 BRPM | OXYGEN SATURATION: 95 %

## 2025-03-11 DIAGNOSIS — G82.50 QUADRIPLEGIA AND QUADRIPARESIS (MULTI): Primary | ICD-10-CM

## 2025-03-11 PROCEDURE — 7100000009 HC PHASE TWO TIME - INITIAL BASE CHARGE: Performed by: ORTHOPAEDIC SURGERY

## 2025-03-11 PROCEDURE — 7100000002 HC RECOVERY ROOM TIME - EACH INCREMENTAL 1 MINUTE: Performed by: ORTHOPAEDIC SURGERY

## 2025-03-11 PROCEDURE — 2500000004 HC RX 250 GENERAL PHARMACY W/ HCPCS (ALT 636 FOR OP/ED): Mod: JZ | Performed by: ORTHOPAEDIC SURGERY

## 2025-03-11 PROCEDURE — 2500000005 HC RX 250 GENERAL PHARMACY W/O HCPCS: Performed by: ORTHOPAEDIC SURGERY

## 2025-03-11 PROCEDURE — C1772 INFUSION PUMP, PROGRAMMABLE: HCPCS | Performed by: ORTHOPAEDIC SURGERY

## 2025-03-11 PROCEDURE — 2720000007 HC OR 272 NO HCPCS: Performed by: ORTHOPAEDIC SURGERY

## 2025-03-11 PROCEDURE — 3700000002 HC GENERAL ANESTHESIA TIME - EACH INCREMENTAL 1 MINUTE: Performed by: ORTHOPAEDIC SURGERY

## 2025-03-11 PROCEDURE — 3600000002 HC OR TIME - INITIAL BASE CHARGE - PROCEDURE LEVEL TWO: Performed by: ORTHOPAEDIC SURGERY

## 2025-03-11 PROCEDURE — A62362 PR INSERT/ REPLACE INFUSN PUMP,PROGRAMMABLE

## 2025-03-11 PROCEDURE — 3700000001 HC GENERAL ANESTHESIA TIME - INITIAL BASE CHARGE: Performed by: ORTHOPAEDIC SURGERY

## 2025-03-11 PROCEDURE — 7100000001 HC RECOVERY ROOM TIME - INITIAL BASE CHARGE: Performed by: ORTHOPAEDIC SURGERY

## 2025-03-11 PROCEDURE — 62362 IMPLANT SPINE INFUSION PUMP: CPT | Performed by: ORTHOPAEDIC SURGERY

## 2025-03-11 PROCEDURE — 3600000007 HC OR TIME - EACH INCREMENTAL 1 MINUTE - PROCEDURE LEVEL TWO: Performed by: ORTHOPAEDIC SURGERY

## 2025-03-11 PROCEDURE — 2780000003 HC OR 278 NO HCPCS: Performed by: ORTHOPAEDIC SURGERY

## 2025-03-11 PROCEDURE — A62362 PR INSERT/ REPLACE INFUSN PUMP,PROGRAMMABLE: Performed by: ANESTHESIOLOGY

## 2025-03-11 PROCEDURE — 2500000004 HC RX 250 GENERAL PHARMACY W/ HCPCS (ALT 636 FOR OP/ED)

## 2025-03-11 PROCEDURE — 7100000010 HC PHASE TWO TIME - EACH INCREMENTAL 1 MINUTE: Performed by: ORTHOPAEDIC SURGERY

## 2025-03-11 PROCEDURE — A4649 SURGICAL SUPPLIES: HCPCS | Performed by: ORTHOPAEDIC SURGERY

## 2025-03-11 DEVICE — INFUSION PUMP, SYNCHROMED III, 40ML: Type: IMPLANTABLE DEVICE | Site: FLANK | Status: FUNCTIONAL

## 2025-03-11 RX ORDER — OXYCODONE HYDROCHLORIDE 5 MG/1
5 TABLET ORAL EVERY 4 HOURS PRN
Status: DISCONTINUED | OUTPATIENT
Start: 2025-03-11 | End: 2025-03-11 | Stop reason: HOSPADM

## 2025-03-11 RX ORDER — MIDAZOLAM HYDROCHLORIDE 1 MG/ML
INJECTION INTRAMUSCULAR; INTRAVENOUS AS NEEDED
Status: DISCONTINUED | OUTPATIENT
Start: 2025-03-11 | End: 2025-03-11

## 2025-03-11 RX ORDER — LIDOCAINE HYDROCHLORIDE 10 MG/ML
0.1 INJECTION, SOLUTION INFILTRATION; PERINEURAL ONCE
Status: DISCONTINUED | OUTPATIENT
Start: 2025-03-11 | End: 2025-03-11 | Stop reason: HOSPADM

## 2025-03-11 RX ORDER — ONDANSETRON HYDROCHLORIDE 2 MG/ML
4 INJECTION, SOLUTION INTRAVENOUS ONCE AS NEEDED
Status: DISCONTINUED | OUTPATIENT
Start: 2025-03-11 | End: 2025-03-11 | Stop reason: HOSPADM

## 2025-03-11 RX ORDER — PROPOFOL 10 MG/ML
INJECTION, EMULSION INTRAVENOUS AS NEEDED
Status: DISCONTINUED | OUTPATIENT
Start: 2025-03-11 | End: 2025-03-11

## 2025-03-11 RX ORDER — ACETAMINOPHEN 325 MG/1
650 TABLET ORAL EVERY 4 HOURS PRN
Status: DISCONTINUED | OUTPATIENT
Start: 2025-03-11 | End: 2025-03-11 | Stop reason: HOSPADM

## 2025-03-11 RX ORDER — SODIUM CHLORIDE, SODIUM LACTATE, POTASSIUM CHLORIDE, CALCIUM CHLORIDE 600; 310; 30; 20 MG/100ML; MG/100ML; MG/100ML; MG/100ML
50 INJECTION, SOLUTION INTRAVENOUS CONTINUOUS
Status: DISCONTINUED | OUTPATIENT
Start: 2025-03-11 | End: 2025-03-11 | Stop reason: HOSPADM

## 2025-03-11 RX ORDER — GENTAMICIN 40 MG/ML
INJECTION, SOLUTION INTRAMUSCULAR; INTRAVENOUS AS NEEDED
Status: DISCONTINUED | OUTPATIENT
Start: 2025-03-11 | End: 2025-03-11

## 2025-03-11 RX ORDER — ESMOLOL HYDROCHLORIDE 10 MG/ML
INJECTION INTRAVENOUS AS NEEDED
Status: DISCONTINUED | OUTPATIENT
Start: 2025-03-11 | End: 2025-03-11

## 2025-03-11 RX ORDER — FENTANYL CITRATE 50 UG/ML
INJECTION, SOLUTION INTRAMUSCULAR; INTRAVENOUS AS NEEDED
Status: DISCONTINUED | OUTPATIENT
Start: 2025-03-11 | End: 2025-03-11

## 2025-03-11 RX ORDER — LIDOCAINE HCL/PF 100 MG/5ML
SYRINGE (ML) INTRAVENOUS AS NEEDED
Status: DISCONTINUED | OUTPATIENT
Start: 2025-03-11 | End: 2025-03-11

## 2025-03-11 RX ORDER — HYDROMORPHONE HYDROCHLORIDE 0.2 MG/ML
0.2 INJECTION INTRAMUSCULAR; INTRAVENOUS; SUBCUTANEOUS EVERY 5 MIN PRN
Status: DISCONTINUED | OUTPATIENT
Start: 2025-03-11 | End: 2025-03-11 | Stop reason: HOSPADM

## 2025-03-11 RX ORDER — CEFAZOLIN 1 G/1
INJECTION, POWDER, FOR SOLUTION INTRAVENOUS AS NEEDED
Status: DISCONTINUED | OUTPATIENT
Start: 2025-03-11 | End: 2025-03-11

## 2025-03-11 RX ORDER — ROCURONIUM BROMIDE 10 MG/ML
INJECTION, SOLUTION INTRAVENOUS AS NEEDED
Status: DISCONTINUED | OUTPATIENT
Start: 2025-03-11 | End: 2025-03-11

## 2025-03-11 RX ORDER — ONDANSETRON HYDROCHLORIDE 2 MG/ML
INJECTION, SOLUTION INTRAVENOUS AS NEEDED
Status: DISCONTINUED | OUTPATIENT
Start: 2025-03-11 | End: 2025-03-11

## 2025-03-11 RX ORDER — SODIUM CHLORIDE 0.9 G/100ML
INJECTION, SOLUTION IRRIGATION AS NEEDED
Status: DISCONTINUED | OUTPATIENT
Start: 2025-03-11 | End: 2025-03-11 | Stop reason: HOSPADM

## 2025-03-11 RX ADMIN — FENTANYL CITRATE 25 MCG: 50 INJECTION, SOLUTION INTRAMUSCULAR; INTRAVENOUS at 08:19

## 2025-03-11 RX ADMIN — LIDOCAINE HYDROCHLORIDE 80 MG: 20 INJECTION INTRAVENOUS at 07:39

## 2025-03-11 RX ADMIN — FENTANYL CITRATE 25 MCG: 50 INJECTION, SOLUTION INTRAMUSCULAR; INTRAVENOUS at 09:11

## 2025-03-11 RX ADMIN — ESMOLOL HYDROCHLORIDE 100 MG: 100 INJECTION, SOLUTION INTRAVENOUS at 07:39

## 2025-03-11 RX ADMIN — SODIUM CHLORIDE, SODIUM LACTATE, POTASSIUM CHLORIDE, AND CALCIUM CHLORIDE: 600; 310; 30; 20 INJECTION, SOLUTION INTRAVENOUS at 07:27

## 2025-03-11 RX ADMIN — SUGAMMADEX 400 MG: 100 INJECTION, SOLUTION INTRAVENOUS at 09:20

## 2025-03-11 RX ADMIN — CEFAZOLIN 2 G: 1 INJECTION, POWDER, FOR SOLUTION INTRAMUSCULAR; INTRAVENOUS at 07:53

## 2025-03-11 RX ADMIN — GENTAMICIN SULFATE 80 MG: 40 INJECTION, SOLUTION INTRAMUSCULAR; INTRAVENOUS at 07:53

## 2025-03-11 RX ADMIN — ONDANSETRON 4 MG: 2 INJECTION, SOLUTION INTRAMUSCULAR; INTRAVENOUS at 08:58

## 2025-03-11 RX ADMIN — ESMOLOL HYDROCHLORIDE 100 MG: 100 INJECTION, SOLUTION INTRAVENOUS at 07:44

## 2025-03-11 RX ADMIN — PROPOFOL 100 MG: 10 INJECTION, EMULSION INTRAVENOUS at 07:39

## 2025-03-11 RX ADMIN — MIDAZOLAM HYDROCHLORIDE 2 MG: 1 INJECTION, SOLUTION INTRAMUSCULAR; INTRAVENOUS at 07:27

## 2025-03-11 RX ADMIN — DEXAMETHASONE SODIUM PHOSPHATE 4 MG: 4 INJECTION INTRA-ARTICULAR; INTRALESIONAL; INTRAMUSCULAR; INTRAVENOUS; SOFT TISSUE at 07:47

## 2025-03-11 RX ADMIN — PROPOFOL 100 MG: 10 INJECTION, EMULSION INTRAVENOUS at 07:41

## 2025-03-11 RX ADMIN — ROCURONIUM BROMIDE 50 MG: 10 INJECTION INTRAVENOUS at 07:41

## 2025-03-11 RX ADMIN — FENTANYL CITRATE 25 MCG: 50 INJECTION, SOLUTION INTRAMUSCULAR; INTRAVENOUS at 08:35

## 2025-03-11 ASSESSMENT — PAIN - FUNCTIONAL ASSESSMENT
PAIN_FUNCTIONAL_ASSESSMENT: 0-10

## 2025-03-11 ASSESSMENT — PAIN SCALES - GENERAL
PAINLEVEL_OUTOF10: 0 - NO PAIN

## 2025-03-11 ASSESSMENT — COLUMBIA-SUICIDE SEVERITY RATING SCALE - C-SSRS
1. IN THE PAST MONTH, HAVE YOU WISHED YOU WERE DEAD OR WISHED YOU COULD GO TO SLEEP AND NOT WAKE UP?: NO
2. HAVE YOU ACTUALLY HAD ANY THOUGHTS OF KILLING YOURSELF?: NO
6. HAVE YOU EVER DONE ANYTHING, STARTED TO DO ANYTHING, OR PREPARED TO DO ANYTHING TO END YOUR LIFE?: NO

## 2025-03-11 NOTE — OP NOTE
Baclofen pump replacement with 40 mL pump Operative Note     Date: 3/11/2025  OR Location: [facility] OR    Name: Jose Fernandez, : 2000, Age: 25 y.o., MRN: [id number], Sex: male    Diagnosis  Pre-op Diagnosis      * Quadriplegia and quadriparesis (Multi) [G82.50] Post-op Diagnosis     * Quadriplegia and quadriparesis (Multi) [G82.50]     Procedures  Baclofen pump replacement with 40 mL pump  65625 - SD IMPLTJ/RPLCMT ITHCL/EDRL DRUG NFS PRGRBL PUMP      Surgeons      * Yoko Riley - Primary    Resident/Fellow/Other Assistant:  Surgeons and Role:  * No surgeons found with a matching role *    Staff:   Circulator: Justin Thompson Person: Geena    Anesthesia Staff: Anesthesiologist: Kaila Cherry MD  C-AA: CAITLIN Munoz    Procedure Summary  Anesthesia: General  ASA: III  Estimated Blood Loss: 2 mL  Intra-op Medications:   Administrations occurring from 0700 to 0900 on 25:   Medication Name Total Dose   sodium chloride 0.9 % irrigation solution 1,000 mL   baclofen (Lioresal) 2,000 mcg/mL intrathecal injection 80 mg   ceFAZolin (Ancef) vial 1 g 2 g   dexAMETHasone (Decadron) 4 mg/mL 4 mg   esmolol (Brevibloc) injection 200 mg   fentaNYL (Sublimaze) injection 50 mcg/mL 50 mcg   gentamicin 40 mg/mL 80 mg   LR bolus Cannot be calculated   lidocaine (cardiac) injection 2% prefilled syringe 80 mg   midazolam PF (Versed) injection 1 mg/mL 2 mg   ondansetron 2 mg/mL 4 mg   propofol (Diprivan) injection 10 mg/mL 200 mg   rocuronium (ZeMuron) 50 mg/5 mL injection 50 mg              Anesthesia Record               Intraprocedure I/O Totals          Intake    LR bolus 700.00 mL    Total Intake 700 mL       Output    Est. Blood Loss 10 mL    Total Output 10 mL       Net    Net Volume 690 mL          Specimen: No specimens collected              Drains and/or Catheters: * None in log *    Tourniquet Times:         Implants:  Implants       Type Name Action Serial No.      Neuro Interventional Implant  INFUSION PUMP, SYNCHROMED III, 40ML - [id number] - [id number] Implanted [id number]              Findings: Poor flow from catheter, but pocket was healthy    Indications: Jose Fernandez is an 25 y.o. male who is having surgery for Quadriplegia and quadriparesis (Multi) [G82.50]. His pump battery has reached the end of battery life and an elective replacement is necessary.    The patient was seen in the preoperative area. The risks, benefits, complications, treatment options, non-operative alternatives, expected recovery and outcomes were discussed with the patient. The possibilities of reaction to medication, pulmonary aspiration, injury to surrounding structures, bleeding, recurrent infection, the need for additional procedures, failure to diagnose a condition, and creating a complication requiring transfusion or operation were discussed with the patient. The patient concurred with the proposed plan, giving informed consent.  The site of surgery was properly noted/marked if necessary per policy. The patient has been actively warmed in preoperative area. Preoperative antibiotics have been ordered and given within 1 hours of incision. Venous thrombosis prophylaxis have been ordered including bilateral sequential compression devices    Procedure Details: Jose Fernandez was identified in the preoperative holding area.  The operative site, the abdomen, was marked.  The patient was seen in the operating room on a gurney and underwent general anesthesia.  The abdomen was prepped and draped in the usual sterile fashion after preparation with alcohol and ChloraPrep.  Antibiotics were given at least 15 minutes before the start of surgery.  The previous incision was opened and dissection was carried down through the soft tissues with Bovie electrocautery.  The pump was uncovered and removed from the pocket.  The catheter was disconnected from the old pump and a new 40 mL pump, which had been prepped on the back table, was  implanted.  The sideport was aspirated to check for good catheter flow.  The CAP was placed in the 10 o'clock position.  The pocket was closed with 0 Vicryl followed by 2-0 Vicryl and 4-0 Monocryl.  Dressings included dermal glue, Steri-Strips, Xeroform, Telfa, and Tegaderm dressings.  When this was complete the pump was electronically reprogrammed to fill the internal tubing and the catheter with intrathecal baclofen.  When this was complete, the patient was awakened from anesthesia breathing spontaneously and taken to the recovery room in stable condition.  Counts were correct.  Complications:  None; patient tolerated the procedure well.    Disposition: PACU - hemodynamically stable.  Condition: stable       Additional Details: Follow up in 1-2 weeks.    Attending Attestation: I was present and scrubbed for the entire procedure.    Yoko Riley  Phone Number: [phone]

## 2025-03-11 NOTE — H&P
[facility] Department of Orthopaedic Surgery   Surgical History & Physical >30 Days    Planned Procedure: Replacement of baclofen pump with 40 mL pump    History & Physical Reviewed:  Jose Fernandez is a 25 y.o. male presenting with the above symptoms. This patient was evaluated as an outpatient, and a plan was made for operative management. Risks and benefits were discussed, and the patient and/or caregivers elected to proceed. The patient presents for the above listed procedure today.     Past Medical History:   Diagnosis Date    Abnormal results of pulmonary function studies 03/21/2019    Abnormal PFT    Acute infarction of spinal cord (embolic) (nonembolic) (Multi) 04/30/2020    intrauterine hemorrhage and subsequent cord infarction    Asthma     last seen by Lyric Cameron 05/11/2023    GERD (gastroesophageal reflux disease)     s/p Nissen    Hemiplegia (Multi)     History of transfusion     Immunocompromised     Intra-abdominal abscess (Multi)     recurrent    Mitrofanoff appendicovesicostomy present (Multi)     MSSA (methicillin susceptible Staphylococcus aureus)     MSSA R gluteal abscess 03/2023    Myelomalacia (Multi)     Neurogenic bladder     Neurogenic bowel     Other conditions influencing health status 07/21/2013    Drug-induced Myelopathy    Other conditions influencing health status 03/04/2022    Abscess, abdomen    Pectus excavatum 07/21/2013    Pectus excavatum    Peritoneal abscess (Multi) 05/24/2021    Peritonitis with abscess of intestine    Peritonitis     Personal history of diseases of the skin and subcutaneous tissue 05/17/2019    History of dermatitis    Personal history of diseases of the skin and subcutaneous tissue     History of eczema    Personal history of other diseases of the digestive system 04/13/2020    History of chronic constipation    Personal history of other infectious and parasitic diseases 07/24/2019    History of onychomycosis    Personal history of other  infectious and parasitic diseases 09/24/2018    History of wound infection    Reactive airway disease (HHS-HCC)     Recurrent UTI (urinary tract infection)     s/p Mitrofanoff    Respiratory insufficiency     BiPap supplementation at night-has not used in 10 years    Restrictive lung disease     Scoliosis, unspecified     Kyphoscoliosis    Snoring     Snoring    Tinea pedis 07/23/2019    Tinea pedis of both feet    Urinary tract infection     Recurrent UTI's S/p mitrofanoff with Dr. Myles.    UTI (urinary tract infection)     current    Xerosis cutis 07/23/2019    Xerosis of skin     Past Surgical History:   Procedure Laterality Date    ACHILLES TENDON SURGERY  10/30/2013    Subcutaneous Tenotomy Achilles Tendon Under Gen Anesthesia    BACLOFEN PUMP IMPLANTATION  2019    BRONCHOSCOPY      CYSTOSCOPY  12/12/2024    GASTROSTOMY  10/30/2013    Gastric Surgery Feeding Gastrostomy s/p removal    MITROFANOFF PROCEDURE  08/06/2018    NISSEN FUNDOPLICATION      2003    OTHER SURGICAL HISTORY  10/30/2013    Direct Laryngoscopy    OTHER SURGICAL HISTORY  10/30/2013    Closed Treatment Fx Of Proximal Femoral Neck W/ Manipulation    OTHER SURGICAL HISTORY  04/16/2015    Complex Repair Of Wound Trunk 2.6 To 7.5 Cm    OTHER SURGICAL HISTORY  10/14/2022    Circumcision    SPINAL FUSION      x2 2015, 2017    TRACHEOSTOMY TUBE PLACEMENT      s/p decannulation 2005    US GUIDED PERCUTANEOUS PERITONEAL OR RETROPERITONEAL FLUID COLLECTION DRAINAGE  04/08/2021    US GUIDED PERCUTANEOUS PERITONEAL OR RETROPERITONEAL FLUID COLLECTION DRAINAGE 4/8/2021 [facility]     Social History     Tobacco Use    Smoking status: Never     Passive exposure: Never    Smokeless tobacco: Never   Substance Use Topics    Alcohol use: Yes     Alcohol/week: 0.0 - 3.0 standard drinks of alcohol     Comment: patient states socially     Prior to Admission medications    Medication Sig Start Date End Date Taking? Authorizing Provider   albuterol 2.5 mg /3  mL (0.083 %) nebulizer solution Take 3 mL (2.5 mg) by nebulization every 4 hours if needed for wheezing. Inhale one vial every 4-6 hours as needed for cough, wheezing and shortness of breath  Patient taking differently: Take 3 mL (2.5 mg) by nebulization once daily. Inhale one vial every 4-6 hours as needed for cough, wheezing and shortness of breath 1/27/25 3/11/25 Yes PATRICK Youssef   chlorhexidine (Hibiclens) 4 % external liquid Apply 1 Application topically once daily for 5 days. Use per CPM/PAT provided instructions 3/5/25 3/11/25 Yes Yuly iSlva MD   cholecalciferol (Vitamin D-3) 50 mcg (2,000 unit) capsule TAKE 1 CAPSULE Daily 6/17/22  Yes Historical Provider, MD   docusate sodium 250 mg capsule Take by mouth once daily.   Yes Historical Provider, MD   L. ACIDOPHILUS/BIFID. ANIMALIS ORAL Take 1 capsule by mouth once daily.   Yes Historical Provider, MD   mirabegron (Myrbetriq) 50 mg tablet extended release 24 hr 24 hr tablet Take 1 tablet (50 mg) by mouth once daily. 1/22/25  Yes Adam Vargas MD   mupirocin (Bactroban) 2 % ointment Apply topically 2 times a day for 5 days. Apply to the inside of both nostrils with a cotton swab daily for 5 days prior to surgery. 3/7/25 3/12/25 Yes Yoko Riley MD   omeprazole (PriLOSEC) 40 mg DR capsule Take 1 capsule (40 mg) by mouth once daily in the morning. Take before meals. Do not crush or chew. 2/24/25 2/24/26 Yes PATRICK Weir   oxybutynin XL (Ditropan-XL) 15 mg 24 hr tablet Take 1 tablet (15 mg) by mouth 2 times a day. 10/9/24  Yes Adam Vargas MD   polyethylene glycol (Miralax) 17 gram/dose powder Mix 17 g of powder and drink 2 times a day before meals. 2/24/25 2/24/26 Yes Filomena S Roberto, APRN-CNP   budesonide-formoteroL (Symbicort) 160-4.5 mcg/actuation inhaler Inhale 2 puffs 2 times a day. Rinse mouth with water after use to reduce aftertaste and incidence of candidiasis. Do not swallow.  Patient  not taking: Reported on 3/11/2025 7/24/24 12/12/24  PATRICK Youssef   chlorhexidine (Peridex) 0.12 % solution Use 15 mL in the mouth or throat once daily for 2 doses. 3/5/25 3/7/25  Yuly Silva MD   famotidine (Pepcid) 40 mg tablet Take 1 tablet (40 mg) by mouth 2 times a day.  Patient not taking: Reported on 3/11/2025 2/24/25   Corry LINARES PATRICK Mejia   ferrous sulfate ER (Slow Fe) 142 mg ER tablet Take 1 tablet by mouth once daily. 2/5/19   Historical Provider, MD   mometasone (Asmanex Twisthaler) 220 mcg/ actuation (14) inhaler Inhale 2 puffs once daily as needed. Rinse mouth with water after use to reduce aftertaste and incidence of candidiasis. Do not swallow.  Patient not taking: Reported on 2/21/2025    Historical Provider, MD   montelukast (Singulair) 10 mg tablet Take 1 tablet (10 mg) by mouth once daily. 7/24/24 2/21/25  PATRICK Youssef     Allergies   Allergen Reactions    Ciprofloxacin Anaphylaxis     Has taken as an adult, anaphylaxis as an infant    Sulfamethoxazole-Trimethoprim Anaphylaxis    Sutures Other     Patients mother states patient reacts to dissolveable suture     Fish Containing Products Hives    Shellfish Containing Products Hives    Vancomycin Other     Red Man's syndrome        Review of Systems:   Gen: Denies recent weight loss  Neuro: Denies recent confusion  Ophtho: Denies changes in vision  ENT: Denies changes in hearing  Endo: Denies weight loss/weight gain  CV: Denies chest pain  Resp: Denies shortness of breath  GI: Denies melena/hematochezia  : Denies painful urination  MSK: Per above HPI  Heme: No abnormal bleeding  Psych: Denies hallucinations    Physical Exam:  General: Well developed, well nourished male in no acute distress.  Skin: The skin is intact with no evidence of abrasions, bruises, or swelling. I can still see the deep sutures, but there are no signs of infection.  Vascular: There are 2+ pulses in both upper extremities and  brisk capillary refill.  Neuro: The light touch sensation is mostly intact in both arms, but there are some less sensitive areas on the right secondary to the spinal cord injury. The AIN/PIN/Ulnar nerve function is intact in his left arm.     He sat well on the bed. He leans minimally to the right. His neurological examination remains stable. He has a long scar down his back from previous surgery as well as a well healed pump incision. He also has no function of his legs and his right hand is nonfunctional. He had minimal spasticity on exam today or during the move from bed to chair.     ERAS patient?: No    COVID-19 Risk Consent:   Surgeon has reviewed the key risks related to tigre COVID-19 and subsequent sequelae.     Maris Guillermo MD   Orthopedic Surgery PGY1  [facility]

## 2025-03-11 NOTE — BRIEF OP NOTE
Date: 3/11/2025  OR Location: [facility] OR    Name: Jose Fernandez, : 2000, Age: 25 y.o., MRN: [id number], Sex: male    Diagnosis  Pre-op Diagnosis      * Quadriplegia and quadriparesis (Multi) [G82.50] Post-op Diagnosis     * Quadriplegia and quadriparesis (Multi) [G82.50]     Procedures  Baclofen pump replacement with 40 mL pump  37948 - NV IMPLTJ/RPLCMT ITHCL/EDRL DRUG NFS PRGRBL PUMP      Surgeons      * Yoko Riley - Primary    Resident/Fellow/Other Assistant:  Surgeons and Role:  * No surgeons found with a matching role *    Staff:   Kaleulator: Justin Thompson Person: Geena    Anesthesia Staff: Anesthesiologist: Kaila Cherry MD  C-AA: CAITLIN Munoz    Procedure Summary  Anesthesia: General  ASA: III  Estimated Blood Loss: 2 mL  Intra-op Medications:    Administrations occurring from 0700 to 0900 on 25:   Medication Name Total Dose   sodium chloride 0.9 % irrigation solution 1,000 mL   baclofen (Lioresal) 2,000 mcg/mL intrathecal injection 80 mg   ceFAZolin (Ancef) vial 1 g 2 g   dexAMETHasone (Decadron) 4 mg/mL 4 mg   esmolol (Brevibloc) injection 200 mg   fentaNYL (Sublimaze) injection 50 mcg/mL 50 mcg   gentamicin 40 mg/mL 80 mg   LR bolus Cannot be calculated   lidocaine (cardiac) injection 2% prefilled syringe 80 mg   midazolam PF (Versed) injection 1 mg/mL 2 mg   ondansetron 2 mg/mL 4 mg   propofol (Diprivan) injection 10 mg/mL 200 mg   rocuronium (ZeMuron) 50 mg/5 mL injection 50 mg              Anesthesia Record               Intraprocedure I/O Totals          Intake    LR bolus 700.00 mL    Total Intake 700 mL       Output    Est. Blood Loss 10 mL    Total Output 10 mL       Net    Net Volume 690 mL          Specimen: No specimens collected               Findings: Deep pocket, healthy tissue, poor catheter flow    Complications:  None; patient tolerated the procedure well.     Disposition: PACU - hemodynamically stable.  Condition: stable  Specimens Collected: No specimens  collected  Attending Attestation: I was present and scrubbed for the entire procedure.    Yoko Riley  Phone Number: [phone]

## 2025-03-11 NOTE — ANESTHESIA PROCEDURE NOTES
Airway  Date/Time: 3/11/2025 7:44 AM  Urgency: elective    Airway not difficult    Staffing  Performed: CAITLIN   Authorized by: Kaila Cherry MD    Performed by: CAITLIN Munoz  Patient location during procedure: OR    Indications and Patient Condition  Indications for airway management: anesthesia and airway protection  Sedation level: deep  Preoxygenated: yes  Patient position: sniffing  MILS not maintained throughout  Mask difficulty assessment: 2 - vent by mask + OA or adjuvant +/- NMBA    Final Airway Details  Final airway type: endotracheal airway      Successful airway: ETT  Cuffed: yes   Successful intubation technique: video laryngoscopy  Facilitating devices/methods: intubating stylet  Endotracheal tube insertion site: oral  Blade: Dulce  Blade size: #4  ETT size (mm): 6.5  Cormack-Lehane Classification: grade I - full view of glottis  Placement verified by: chest auscultation and capnometry   Measured from: lips  ETT to lips (cm): 22  Number of attempts at approach: 1    Additional Comments  One person mask w/ nasal airway

## 2025-03-11 NOTE — PERIOPERATIVE NURSING NOTE
0930 Pt arrived to PACU, alert and able to answer questions. Pt denies pain and discomfort, no distress noted. Will continue to monitor.     1015 Report given to discharge nurse. Pt stable. Pt moved to discharge with all personal belongings.           Maribeth Vaca RN

## 2025-03-11 NOTE — ANESTHESIA PREPROCEDURE EVALUATION
Patient: Jose Fernandez    Procedure Information       Anesthesia Start Date/Time: 03/11/25 0727    Procedure: Baclofen pump replacement with 40 mL pump (Pelvis)    Location: [facility] OR 08 / [facility]    Surgeons: Yoko Riley MD            Relevant Problems   Anesthesia (within normal limits)      Pulmonary   (+) Asthma, mild persistent (HHS-HCC)   (+) Restrictive lung disease      Neuro   (+) C5-C7 incomplete quadriplegia (Multi)   (+) Quadriplegia and quadriparesis (Multi)      GI   (+) Gastroesophageal reflux      Musculoskeletal   (+) Neuromuscular scoliosis       Clinical information reviewed:    Allergies  Meds               NPO Detail:  NPO/Void Status  Carbohydrate Drink Given Prior to Surgery? : N  Date of Last Liquid: 03/11/25  Time of Last Liquid: 0415  Date of Last Solid: 03/10/25  Time of Last Solid: 2100  Last Intake Type: Clear fluids  Time of Last Void: 0300 (self cath thru abd)         Physical Exam    Airway  Mallampati: III     Cardiovascular    Dental    Pulmonary    Abdominal            Anesthesia Plan    History of general anesthesia?: yes  History of complications of general anesthesia?: no    ASA 3     general     intravenous induction   Anesthetic plan and risks discussed with patient.    Plan discussed with CAA.

## 2025-03-11 NOTE — DISCHARGE INSTRUCTIONS
Follow-Up Instructions  You will need to be seen in clinic by Dr. Riley in 2 weeks for a post-operative evaluation.    You will need to call and schedule an appointment, unless there is a previous appointment that appears on your discharge instructions.  The direct orthopaedic clinic appointment line phone number is [phone].  Please do not delay in calling to make this appointment.    You should also follow up with your primary care provider in 1-2 weeks.    Activity Restrictions  1) Weight bearing status --> No restrictions.     Wound Care:  1) Please maintain dressing until follow-up appointment

## 2025-03-12 ENCOUNTER — TELEPHONE (OUTPATIENT)
Dept: ORTHOPEDIC SURGERY | Facility: HOSPITAL | Age: 25
End: 2025-03-12
Payer: COMMERCIAL

## 2025-03-12 NOTE — TELEPHONE ENCOUNTER
SYMPTOM PHONE CALL    Name of Patient: Jose Fernandez  Parent or Guardian's Name: Lakesha- Mom       Reason for Call: In Pain     Additional Information:  Mom states patient has been in quite a bit of pain since surgery yesterday. Would like to know what patient can take to alleviate symptoms?    Call Back Number: [phone]   Previous Visit: Date 3/11/25 With Rosemary   Date of Next Visit: Date 3/27/25  With Rosemary

## 2025-03-13 DIAGNOSIS — G82.50 QUADRIPLEGIA AND QUADRIPARESIS (MULTI): Primary | ICD-10-CM

## 2025-03-13 RX ORDER — DIAZEPAM 5 MG/1
5 TABLET ORAL EVERY 8 HOURS PRN
Qty: 15 TABLET | Refills: 0 | Status: SHIPPED | OUTPATIENT
Start: 2025-03-13 | End: 2025-03-18

## 2025-03-13 RX ORDER — DIAZEPAM 5 MG/1
5 TABLET ORAL EVERY 8 HOURS PRN
Status: SHIPPED | OUTPATIENT
Start: 2025-03-13

## 2025-03-13 RX ORDER — BACLOFEN 20 MG/1
20 TABLET ORAL 2 TIMES DAILY
Qty: 60 TABLET | Refills: 0 | Status: SHIPPED | OUTPATIENT
Start: 2025-03-13 | End: 2025-04-12

## 2025-03-27 ENCOUNTER — OFFICE VISIT (OUTPATIENT)
Dept: ORTHOPEDIC SURGERY | Facility: CLINIC | Age: 25
End: 2025-03-27
Payer: COMMERCIAL

## 2025-03-27 DIAGNOSIS — G82.50 QUADRIPLEGIA AND QUADRIPARESIS (MULTI): Primary | ICD-10-CM

## 2025-03-27 DIAGNOSIS — G95.11 SPINAL CORD INFARCTION (MULTI): ICD-10-CM

## 2025-03-27 PROCEDURE — 1036F TOBACCO NON-USER: CPT | Performed by: ORTHOPAEDIC SURGERY

## 2025-03-27 PROCEDURE — 99211 OFF/OP EST MAY X REQ PHY/QHP: CPT | Performed by: ORTHOPAEDIC SURGERY

## 2025-03-27 NOTE — PROGRESS NOTES
Jose's incision is well-healed.  He has no signs or symptoms of infection.  He did have a small amount of drainage from the very lateral corner, but no other areas of concern.  I clipped 1 stitch from the medial side.  He is due for a pump refill before September 11 until we scheduled an appointment for him to return.  He will need kit #[id number].  
1

## 2025-04-18 ENCOUNTER — APPOINTMENT (OUTPATIENT)
Dept: INFECTIOUS DISEASES | Facility: CLINIC | Age: 25
End: 2025-04-18
Payer: MEDICAID

## 2025-04-29 DIAGNOSIS — R39.9 UTI SYMPTOMS: ICD-10-CM

## 2025-04-30 DIAGNOSIS — G82.50 QUADRIPLEGIA AND QUADRIPARESIS (MULTI): ICD-10-CM

## 2025-04-30 DIAGNOSIS — G95.11 SPINAL CORD INFARCTION (MULTI): ICD-10-CM

## 2025-04-30 RX ORDER — MUPIROCIN 20 MG/G
OINTMENT TOPICAL
Qty: 15 G | Refills: 0 | Status: SHIPPED | OUTPATIENT
Start: 2025-04-30

## 2025-05-02 ENCOUNTER — DOCUMENTATION (OUTPATIENT)
Dept: UROLOGY | Facility: CLINIC | Age: 25
End: 2025-05-02
Payer: COMMERCIAL

## 2025-05-02 DIAGNOSIS — N39.0 ACUTE UTI: ICD-10-CM

## 2025-05-02 RX ORDER — CEPHALEXIN 500 MG/1
500 CAPSULE ORAL 4 TIMES DAILY
Qty: 40 CAPSULE | Refills: 0 | Status: CANCELLED | OUTPATIENT
Start: 2025-05-02 | End: 2025-05-12

## 2025-05-02 NOTE — PROGRESS NOTES
Pt's urine culture growing gram neg bacilli  preliminary results  Dr Vargas will  treat with keflex 500mg qid x 10 days. But pt states he will wait until final results he does not want to take due to diarrhea from medication .

## 2025-05-03 LAB — BACTERIA UR CULT: ABNORMAL

## 2025-05-05 DIAGNOSIS — N30.00 ACUTE CYSTITIS WITHOUT HEMATURIA: ICD-10-CM

## 2025-05-05 DIAGNOSIS — N39.0 RECURRENT UTI: Primary | ICD-10-CM

## 2025-05-05 RX ORDER — AMOXICILLIN AND CLAVULANATE POTASSIUM 875; 125 MG/1; MG/1
1 TABLET, FILM COATED ORAL 2 TIMES DAILY
Qty: 20 TABLET | Refills: 0 | Status: SHIPPED | OUTPATIENT
Start: 2025-05-05 | End: 2025-05-15

## 2025-05-07 ENCOUNTER — APPOINTMENT (OUTPATIENT)
Dept: UROLOGY | Facility: CLINIC | Age: 25
End: 2025-05-07
Payer: COMMERCIAL

## 2025-05-07 VITALS — HEART RATE: 76 BPM | DIASTOLIC BLOOD PRESSURE: 62 MMHG | SYSTOLIC BLOOD PRESSURE: 104 MMHG

## 2025-05-07 DIAGNOSIS — N30.90 RECURRENT CYSTITIS: ICD-10-CM

## 2025-05-07 DIAGNOSIS — N39.45 URINARY INCONTINENCE WITH CONTINUOUS LEAKAGE: ICD-10-CM

## 2025-05-07 DIAGNOSIS — Z93.52: ICD-10-CM

## 2025-05-07 DIAGNOSIS — N31.9 NEUROGENIC BLADDER: Primary | ICD-10-CM

## 2025-05-07 PROCEDURE — 99214 OFFICE O/P EST MOD 30 MIN: CPT | Performed by: STUDENT IN AN ORGANIZED HEALTH CARE EDUCATION/TRAINING PROGRAM

## 2025-05-07 NOTE — PROGRESS NOTES
Scribed for Dr. Adam Vargas by Jose Painting. I, Dr. Adam Vargas have personally reviewed and agreed with the information entered by the Virtual Scribe. 05/07/25.    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  #Neurogenic bowel + bladder  #Urinary incontinence  #OAB - spasms  S/p cysto, bladder botox and exp lap with me (10/20/23)  S/p bladder botox (200 UI) with me (06/06/24, 12/12/24).  Reports good response to latest round of botox that has persisted now >6 months  Remains satisfied with results of the procedure.     Repeat renal ultrasound in Sept 2025 for surveillance.  Can consider performing this in the clinic or OR without sedation when next required, he will let us know  Denotes benefit with continuing oxybutynin BID and Myrbetriq 50 mg.   Tolerating the medication without side-effects.   Discussed risks of continued use and alternative treatment options.   Patient elects to continue medical regiment, Rx refill sent to pharmacy.   RTC in Oct/Nov 2025 for reassessment and ultrasound results.     #Recurrent UTI's  S/p mitrofanoff with Dr. Myles.  Serial urine cultures positive for klebsiella species.   [06/06/24, 10/09/24, 12/12/24, 02/13/25, 04/30/25]    Advised to complete current course of antibiotics.   Continues on 10d course of Augmentin BID.   Continue Macrodantin 50 mg daily for UTI suppression.      TO REVIEW:  Treat empirically with course of Augmentin for presumed UTI.  Encouraged to call if he develops any UTI or acute//worsening symptoms.   Encouraged to call if he develops issues with cathing,  retention or leakage.     All questions were answered to the patient’s satisfaction.  Patient agrees with the plan and wishes to proceed.  Continue follow-up for ongoing care of his chronic medical conditions.       History of Present Illness (HPI):  Jose presents for a follow up visit.   The patient’s EMR has been reviewed.  Lives in [place], [place]  Accompanied by Mother whom provides additional history.    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 Hx of recurrent UTIs s/p mitrofanoff with Dr. Myles. Was experiencing recurrent intra-abdominal abscess, now resolving with improved catheter timing and technique. Previously followed by Pediatric Urology (Dr. Workman).     UDS//CT and MRI imaging reviewed:  bladder capacity appears in the 300-400ml range which should be well managed with his current CIC regimen and oral medications. Numerous CT and MRI images reviewed going back to 2021, possible diverticula vs. thin area seen in the bladder dome, small right posterior diverticulum, nothing significant, most recent CT without clear/definitive perforation obvious.     S/p cysto, bladder botox and exp lap with me (10/20/23)  Ex-lap unsuccessful due to intraabdominal adhesions  Cystoscopy, cystogram, and looposcopy/loopogram did not reveal any perforation. There were well-healed areas of injury in the posterior wall of the bladder near the Mitrofanoff entry point that I feel are likely due to injuries with catheterization.    S/p bladder botox (200 UI) with me (06/06/24).  Had a good response to latest round of botox.   Stated his incontinence improved, no longer wet in the morning.   Remained satisfied with results of the procedure as of 10/09/24.  Continues on oxybutynin BID and Myrbetriq.     Serial cultures grew klebsiella pneumo in the interim.   Urine culture positive 06/06/24, 10/09/24, 12/12/24, 02/13/25,  04/30/25.  Continues on course of Augmentin. (05/05/25)    TODAY: (05/07/25)  Reports he has been doing well overall.   Reports continued benefit with latest round of botox. (06/06/24)  For next round, he would prefer to not undergo anesthesia.   States he does not have sensation, and want to avoid anesthesia.   Continues on course of Augmentin for his klebsiella UTI.   Denies any side-effects, no acute or worsening complaints.     TO REVIEW: [10/09/24]  Presents for follow up and ultrasound results.   Reports he has been doing well overall.   States he continues to benefit from latest round of botox.   States he has been able to withhold his urine better.   Allowing for more time between having to cath.   Remains satisfied with results of procedure.   No recent infections, gross hematuria, fevers or chills.   Continues on oxybutynin as well with good response.   Denies side-effects.     RBUS (09/11/24) reviewed.   No renal, ureteral, or bladder pathology.   No tumor, or stones.     TO REVIEW: (07/03/24)  Presents for a post-op visit.   States his leakage seems improved s/p latest round of botox.   Reporting that he no longer wakes up wet in the morning.   Continues oxybutynin BID (morning and evening) as well.   Continues with baclofen pump 2/2 muscle spasms.   Recently increased pump dose.   Denies any recent UTI's.   IO UA showed trace leuks, no blood or signs of infection.     TO REVIEW: (02/21/24)  Reports he has been doing well overall.   Reports significant improvement since starting bladder botox.   However, continues to have recurrent UTI's.   Albeit, he has only been symptomatic for 1-2 days before resolving.   Continues daily Macrobid for prophylaxis.   Denies any gross hematuria, fevers or chills.      TO REVIEW: (11/15/23)  Accompanied by Mother whom provides additional history.  Concerned that he may have a UTI given his malodorous urine.  Reports having about 3x UTI's in the past 6 months.   Not  currently on antibiotics for prophylaxis.      S/p courses of keflex, amikacin and gentamicin washes for prophylaxis.   Tolerated levofloxacin with benadryl in the past.   Has tolerated macrobid in the past as well.   Continues CIC, no issues.   Feel botox is working  Cathing for more volumes and less of an interval, less leakage in pull-up      Notes he appears to be more constipated since the procedure.   Gradually improving post-operatively.      TO REVIEW: (09/06/23)  Doing well s/p latest hospitalization for an intraabdominal infection.  Continues flagyl and ceftriaxone for treatment. Followed by ID.  ID is concerned given his ESR remains elevated.  Plans to undergo further imaging.  Denies any recent infections.     Continues CIC via mitrafanoff, without significant issue.  C/o persistent OAB symptoms.   No significant benefit with myrbetriq.  Denies any side-effects.     TO REVIEW: (08/23/23)  Myrbetriq helping, denies any side-effects.  Doing well s/p latest hospitalization, still has indwelling urethral catheter  Evaluated by ID yesterday, continues flagyl and ceftriaxone for treatment.  Continues to cath mitrofanoff once daily to promote patency without issue  Denies any gross hematuria, flank pain, recent fevers or chills.      TO REVIEW: Initial visit (07/27/23)  Recently treated for sepsis 2/2 a MSSA R gluteal abscess. (March 2023)  Followed by Pediatric surgery (Dr. Resendiz) for this, s/p vessel loop placement (04/17/23).     Underwent a post-op MRI (05/23/23) 2/2 concerns of infectious recurrence.  MRI was negative for osteomyelitis but did show residual fluid in the abdomen.   Described as phlegmon. Currently he is symptom free and completed antibiotics.     Continues to follow up with pediatric ID, with Dr. Lyric Vargas.   Initially, he was performing gentamicin washes s/p mitrafanoff.   Transitioned to amikacin bladder washes, however discontinued 2/2 adverse reactions. Discontinued this about 1-2  months ago.      Continues to cath his stoma throughout the day.  Mother caths via his penis once before bed and in the morning to fully drain his bladder.   Denies having to irrigate or any significant mucus with this.  Occasionally, she will irrigate if he has a UTI.  He denies any recent UTIs.  Constipation has been under control.      At baseline, he leaks occasionally.   Albeit, he states this has been much improved since taking max dose oxybutynin.  His OAB symptoms and leakage are not too bothersome at this time.  He does not feel he requires additional treatment for this right now.  But he may reconsider this if his symptoms worsen.      PSHx: Multiple gastric surgeries (3x for closure of gastrocutaneous fistula, feeding gastrostomy placement). Hx of baclofen pump.   Allergies: Bactrim (anaphylaxis), vancomycin (rash), ciprofloxacin (urticaria    Past Medical History:   Diagnosis Date    Abnormal results of pulmonary function studies 03/21/2019    Abnormal PFT    Acute infarction of spinal cord (embolic) (nonembolic) (Multi) 04/30/2020    intrauterine hemorrhage and subsequent cord infarction    Asthma     last seen by Lyric Cameron 05/11/2023    GERD (gastroesophageal reflux disease)     s/p Nissen    Hemiplegia (Multi)     History of transfusion     Immunocompromised     Intra-abdominal abscess (Multi)     recurrent    Mitrofanoff appendicovesicostomy present (Multi)     MSSA (methicillin susceptible Staphylococcus aureus)     MSSA R gluteal abscess 03/2023    Myelomalacia (Multi)     Neurogenic bladder     Neurogenic bowel     Other conditions influencing health status 07/21/2013    Drug-induced Myelopathy    Other conditions influencing health status 03/04/2022    Abscess, abdomen    Pectus excavatum 07/21/2013    Pectus excavatum    Peritoneal abscess (Multi) 05/24/2021    Peritonitis with abscess of intestine    Peritonitis     Personal history of diseases of the skin and subcutaneous tissue 05/17/2019     History of dermatitis    Personal history of diseases of the skin and subcutaneous tissue     History of eczema    Personal history of other diseases of the digestive system 04/13/2020    History of chronic constipation    Personal history of other infectious and parasitic diseases 07/24/2019    History of onychomycosis    Personal history of other infectious and parasitic diseases 09/24/2018    History of wound infection    Reactive airway disease (HHS-HCC)     Recurrent UTI (urinary tract infection)     s/p Mitrofanoff    Respiratory insufficiency     BiPap supplementation at night-has not used in 10 years    Restrictive lung disease     Scoliosis, unspecified     Kyphoscoliosis    Snoring     Snoring    Tinea pedis 07/23/2019    Tinea pedis of both feet    Urinary tract infection     Recurrent UTI's S/p mitrofanoff with Dr. Myles.    UTI (urinary tract infection)     current    Xerosis cutis 07/23/2019    Xerosis of skin     Past Surgical History:   Procedure Laterality Date    ACHILLES TENDON SURGERY  10/30/2013    Subcutaneous Tenotomy Achilles Tendon Under Gen Anesthesia    BACLOFEN PUMP IMPLANTATION  2019    BRONCHOSCOPY      CYSTOSCOPY  12/12/2024    GASTROSTOMY  10/30/2013    Gastric Surgery Feeding Gastrostomy s/p removal    MITROFANOFF PROCEDURE  08/06/2018    NISSEN FUNDOPLICATION      2003    OTHER SURGICAL HISTORY  10/30/2013    Direct Laryngoscopy    OTHER SURGICAL HISTORY  10/30/2013    Closed Treatment Fx Of Proximal Femoral Neck W/ Manipulation    OTHER SURGICAL HISTORY  04/16/2015    Complex Repair Of Wound Trunk 2.6 To 7.5 Cm    OTHER SURGICAL HISTORY  10/14/2022    Circumcision    SPINAL FUSION      x2 2015, 2017    TRACHEOSTOMY TUBE PLACEMENT      s/p decannulation 2005    US GUIDED PERCUTANEOUS PERITONEAL OR RETROPERITONEAL FLUID COLLECTION DRAINAGE  04/08/2021    US GUIDED PERCUTANEOUS PERITONEAL OR RETROPERITONEAL FLUID COLLECTION DRAINAGE 4/8/2021 [facility]     Family History    Problem Relation Name Age of Onset    Diabetes Maternal Grandfather       Social History     Tobacco Use   Smoking Status Never    Passive exposure: Never   Smokeless Tobacco Never     Current Outpatient Medications   Medication Sig Dispense Refill    albuterol 2.5 mg /3 mL (0.083 %) nebulizer solution Take 3 mL (2.5 mg) by nebulization every 4 hours if needed for wheezing. Inhale one vial every 4-6 hours as needed for cough, wheezing and shortness of breath (Patient taking differently: Take 3 mL (2.5 mg) by nebulization once daily. Inhale one vial every 4-6 hours as needed for cough, wheezing and shortness of breath) 75 mL 3    amoxicillin-clavulanate (Augmentin) 875-125 mg tablet Take 1 tablet by mouth 2 times a day for 10 days. 20 tablet 0    baclofen (Lioresal) 20 mg tablet Take 1 tablet (20 mg) by mouth 2 times a day. 60 tablet 0    budesonide-formoteroL (Symbicort) 160-4.5 mcg/actuation inhaler Inhale 2 puffs 2 times a day. Rinse mouth with water after use to reduce aftertaste and incidence of candidiasis. Do not swallow. (Patient not taking: Reported on 3/11/2025) 10.2 g 3    cholecalciferol (Vitamin D-3) 50 mcg (2,000 unit) capsule TAKE 1 CAPSULE Daily      diazePAM (Valium) 5 mg tablet Take 1 tablet (5 mg) by mouth every 8 hours if needed for anxiety or muscle spasms for up to 5 days. 15 tablet 0    docusate sodium 250 mg capsule Take by mouth once daily.      famotidine (Pepcid) 40 mg tablet Take 1 tablet (40 mg) by mouth 2 times a day. (Patient not taking: Reported on 3/11/2025) 60 tablet 0    ferrous sulfate ER (Slow Fe) 142 mg ER tablet Take 1 tablet by mouth once daily.      L. ACIDOPHILUS/BIFID. ANIMALIS ORAL Take 1 capsule by mouth once daily.      mirabegron (Myrbetriq) 50 mg tablet extended release 24 hr 24 hr tablet Take 1 tablet (50 mg) by mouth once daily. 30 tablet 11    mometasone (Asmanex Twisthaler) 220 mcg/ actuation (14) inhaler Inhale 2 puffs once daily as needed. Rinse mouth with water  after use to reduce aftertaste and incidence of candidiasis. Do not swallow. (Patient not taking: Reported on 2/21/2025)      montelukast (Singulair) 10 mg tablet Take 1 tablet (10 mg) by mouth once daily. 30 tablet 11    mupirocin (Bactroban) 2 % ointment APPLY AS DIRECTED TWICE DAILY TO THE INSIDES OF BOTH NOSTRILS WITH A COTTON SWAB FOR 5 DAYS PRIOR TO SURGERY 15 g 0    omeprazole (PriLOSEC) 40 mg DR capsule Take 1 capsule (40 mg) by mouth once daily in the morning. Take before meals. Do not crush or chew. 30 capsule 11    oxybutynin XL (Ditropan-XL) 15 mg 24 hr tablet Take 1 tablet (15 mg) by mouth 2 times a day. 60 tablet 11    polyethylene glycol (Miralax) 17 gram/dose powder Mix 17 g of powder and drink 2 times a day before meals. 527 g 11     Current Facility-Administered Medications   Medication Dose Route Frequency Provider Last Rate Last Admin    baclofen (Lioresal) 2,000 mcg/mL intrathecal injection  300 mcg/day intrathecal Continuous Yoko Riley MD 0.006 mL/hr at 06/27/24 1125 300 mcg/day at 06/27/24 1125    baclofen (Lioresal) 2,000 mcg/mL intrathecal injection  300 mcg/day intrathecal Continuous Yoko Riley MD 0.006 mL/hr at 11/21/24 0942 300 mcg/day at 11/21/24 0942    baclofen (Lioresal) intrathecal injection  100 mcg/day intrathecal Continuous Yoko Riley MD 0.002 mL/hr at 01/11/24 1101 100 mcg/day at 01/11/24 1101    diazePAM (Valium) tablet 5 mg  5 mg oral q8h PRN Dotty Jiang, APRN-CNP         Allergies   Allergen Reactions    Ciprofloxacin Anaphylaxis     Has taken as an adult, anaphylaxis as an infant    Sulfamethoxazole-Trimethoprim Anaphylaxis    Sutures Other     Patients mother states patient reacts to dissolveable suture     Fish Containing Products Hives    Shellfish Containing Products Hives    Vancomycin Other     Red Man's syndrome      Past medical, surgical, family and social history in the chart was reviewed and is accurate including any  additions to what is in this HPI.    REVIEW OF SYSTEMS (ROS):   Constitutional: denies any unintentional weight loss or change in strength.  Integumentary: denies any rashes or pruritus.  Eyes: denies any double vision or eye pain.  Ear/Nose/Mouth/Throat: denies any nosebleeds or gum bleeds.  Cardiovascular: denies any chest pain or syncope.  Respiratory: denies hemoptysis.  Gastrointestinal: denies nausea or vomiting.  Musculoskeletal: denies muscle cramping or weakness.  Neurologic: denies convulsions or seizures.  Hematologic/Lymphatic: denies bleeding tendencies.  Endocrine: denies heat/cold intolerance.  All other systems have been reviewed and are negative unless otherwise noted in the HPI.     OBJECTIVE:  There were no vitals taken for this visit.      PHYSICAL EXAM:  Constitutional: No obvious distress.  Eyes: Non-injected conjunctiva, sclera clear, EOMI.  Ears/Nose/Mouth/Throat: No obvious drainage per ears or nose.  Cardiovascular: Extremities are warm and well perfused. No edema, cyanosis or pallor.  Respiratory: No audible wheezing/stridor; respirations do not appear labored.  Gastrointestinal: Abdomen soft, not distended.  Musculoskeletal: Normal ROM of extremities.  Skin: No obvious rashes or open sores.  Neurologic: Alert and oriented, CN 2-12 grossly intact.  Psychiatric: Answers questions appropriately with normal affect.  Hematologic/Lymphatic/Immunologic: No obvious bruises or sites of spontaneous bleeding.  Genitourinary: No CVA tenderness, bladder not palpable.     LABS & IMAGING:  Lab Results   Component Value Date    WBC 7.3 03/07/2025    HGB 15.1 03/07/2025    HCT 45.1 03/07/2025     03/07/2025    CHOL 192 06/03/2024    TRIG 207 (H) 06/03/2024    HDL 40.0 06/03/2024    ALT 24 06/03/2024    AST 22 06/03/2024     12/09/2024    K 5.1 12/09/2024     12/09/2024    CREATININE 0.44 (L) 12/09/2024    BUN 11 12/09/2024    CO2 25 12/09/2024    TSH 1.86 06/03/2024    INR 1.3 (H)  08/11/2023     Scribed for Dr. Adam Vargas by Jose Painting.  I, Dr. Adam Vargas have personally reviewed and agreed with the information entered by the Virtual Scribe. 05/07/25.

## 2025-05-08 PROBLEM — N30.90 RECURRENT CYSTITIS: Status: ACTIVE | Noted: 2025-05-08

## 2025-05-08 RX ORDER — OXYBUTYNIN CHLORIDE 15 MG/1
15 TABLET, EXTENDED RELEASE ORAL 2 TIMES DAILY
Qty: 60 TABLET | Refills: 11 | Status: SHIPPED | OUTPATIENT
Start: 2025-05-08

## 2025-05-08 RX ORDER — MIRABEGRON 50 MG/1
50 TABLET, FILM COATED, EXTENDED RELEASE ORAL DAILY
Qty: 30 TABLET | Refills: 11 | Status: SHIPPED | OUTPATIENT
Start: 2025-05-08

## 2025-05-21 ENCOUNTER — OFFICE VISIT (OUTPATIENT)
Dept: PRIMARY CARE | Facility: CLINIC | Age: 25
End: 2025-05-21
Payer: COMMERCIAL

## 2025-05-21 VITALS
BODY MASS INDEX: 33.47 KG/M2 | HEART RATE: 69 BPM | SYSTOLIC BLOOD PRESSURE: 128 MMHG | HEIGHT: 69 IN | WEIGHT: 226 LBS | DIASTOLIC BLOOD PRESSURE: 74 MMHG | RESPIRATION RATE: 16 BRPM | OXYGEN SATURATION: 97 %

## 2025-05-21 DIAGNOSIS — R60.0 BILATERAL LOWER EXTREMITY EDEMA: ICD-10-CM

## 2025-05-21 DIAGNOSIS — Z00.00 WELL ADULT HEALTH CHECK: Primary | ICD-10-CM

## 2025-05-21 DIAGNOSIS — G82.50 QUADRIPLEGIA AND QUADRIPARESIS (MULTI): ICD-10-CM

## 2025-05-21 PROCEDURE — 1036F TOBACCO NON-USER: CPT | Performed by: INTERNAL MEDICINE

## 2025-05-21 PROCEDURE — 99213 OFFICE O/P EST LOW 20 MIN: CPT | Performed by: INTERNAL MEDICINE

## 2025-05-21 PROCEDURE — 3008F BODY MASS INDEX DOCD: CPT | Performed by: INTERNAL MEDICINE

## 2025-05-21 RX ORDER — HYDROCHLOROTHIAZIDE 25 MG/1
25 TABLET ORAL DAILY
Qty: 30 TABLET | Refills: 5 | Status: SHIPPED | OUTPATIENT
Start: 2025-05-21 | End: 2025-11-17

## 2025-05-21 RX ORDER — BACLOFEN 20 MG/1
20 TABLET ORAL 2 TIMES DAILY PRN
Qty: 60 TABLET | Refills: 3 | Status: SHIPPED | OUTPATIENT
Start: 2025-05-21 | End: 2026-05-21

## 2025-05-21 ASSESSMENT — ENCOUNTER SYMPTOMS
TROUBLE SWALLOWING: 0
SORE THROAT: 0
PALPITATIONS: 0
CHILLS: 0
ABDOMINAL PAIN: 0
FEVER: 0
SHORTNESS OF BREATH: 0

## 2025-05-21 ASSESSMENT — PAIN SCALES - GENERAL: PAINLEVEL_OUTOF10: 0-NO PAIN

## 2025-05-21 NOTE — PROGRESS NOTES
Subjective   Jose Fernandez is a 25 y.o. male who presents for Edema (bilateral legs after sitting in wheelchair for long periods of time. Knows that its due to positional and not being able to get out of chair often. No swelling in the morning, swelling occurs throughout the day.).      History of Present Illness  The patient presents for evaluation of leg swelling.    Leg Swelling  He has been experiencing leg swelling for several years, which has progressively worsened. The swelling is severe, with his legs becoming extremely firm, particularly at night after prolonged sitting in his wheelchair. He also experiences edema in his feet, which can be so severe that a finger can be inserted into the swollen area. The severity of the swelling varies, but it is currently at 50% of its usual intensity. He has attempted to manage the swelling with short compression socks, but this has not been effective. He has also made dietary modifications, including reducing his salt intake and increasing his water consumption. He reports no shortness of breath or difficulty breathing when lying down. He does not experience any urinary issues and reports that both legs are equally affected by the swelling. He has been using a wheelchair for approximately 1.5 years, and it is unclear if this is contributing to the swelling. He has gained some weight, which he is actively trying to reduce. He has gained approximately 30 pounds over the past year, which he attributes to genetic predisposition and a lack of physical activity during the winter months. His last recorded weight was 226 pounds, but he believes he has lost between 5 to 10 pounds since then. He consumes alcohol socially once or twice a week.  - Onset: Several years ago.  - Location: Legs and feet.  - Duration: Progressive worsening over several years.  - Character: Severe swelling, legs becoming extremely firm, edema in feet.  - Alleviating/Aggravating Factors: Short  "compression socks, dietary modifications (reducing salt intake, increasing water consumption), prolonged sitting in wheelchair.  - Timing: Particularly severe at night after prolonged sitting.  - Severity: Currently at 50% of usual intensity; severe enough to insert a finger into the swollen area.    He had Botox injected into his bladder in 10/2024. He gets his kidney ultrasound every August or September and this year it is scheduled in 2025.    SOCIAL HISTORY  He drinks alcohol once or twice a week socially.    FAMILY HISTORY  His uncle had diabetes and  from it at a young age.      Review of Systems   Constitutional:  Negative for chills and fever.   HENT:  Negative for sore throat and trouble swallowing.    Respiratory:  Negative for shortness of breath.    Cardiovascular:  Negative for chest pain, palpitations and leg swelling.   Gastrointestinal:  Negative for abdominal pain.   Skin:  Negative for rash.       Objective   /74   Pulse 69   Resp 16   Ht 1.753 m (5' 9\")   Wt 103 kg (226 lb)   SpO2 97%   BMI 33.37 kg/m²       Physical Exam  Constitutional:       Appearance: Normal appearance.   HENT:      Head: Normocephalic.   Eyes:      Conjunctiva/sclera: Conjunctivae normal.   Cardiovascular:      Rate and Rhythm: Normal rate and regular rhythm.      Heart sounds: Normal heart sounds.   Pulmonary:      Effort: Pulmonary effort is normal.      Breath sounds: Normal breath sounds.   Musculoskeletal:      Cervical back: Neck supple.   Skin:     General: Skin is warm and dry.   Neurological:      Mental Status: He is alert.         Assessment & Plan  Well adult health check    Orders:    Comprehensive Metabolic Panel; Future    CBC; Future    Lipid Panel; Future    TSH with reflex to Free T4 if abnormal; Future    Magnesium; Future    Microscopic Only, Urine; Future    Albumin-Creatinine Ratio, Urine Random; Future    Bilateral lower extremity edema    Orders:    Comprehensive Metabolic Panel; " Future    CBC; Future    Lipid Panel; Future    TSH with reflex to Free T4 if abnormal; Future    Magnesium; Future    Microscopic Only, Urine; Future    Albumin-Creatinine Ratio, Urine Random; Future    Vascular US lower extremity venous duplex bilateral; Future    hydroCHLOROthiazide (HYDRODiuril) 25 mg tablet; Take 1 tablet (25 mg) by mouth once daily.       Assessment & Plan  1. Lower extremity edema: Chronic.  - Symptoms suggest mild swelling due to salt retention and prolonged leg dependency, leading to insufficient venous return.  - Prescribe low-dose diuretic, hydrochlorothiazide, for daily use to manage fluid and sodium levels.  - Order ultrasound to rule out the presence of blood clots.  - Conduct annual labs, including urine protein and hemoglobin A1c tests, to monitor overall health status.    Follow-up  - Schedule follow-up appointment to review lab results and ultrasound findings.      Troy Chiang,    This medical note was created with the assistance of artificial intelligence (AI) for documentation purposes. The content has been reviewed and confirmed by the healthcare provider for accuracy and completeness. Patient consented to the use of audio recording and use of AI during their visit.

## 2025-05-22 DIAGNOSIS — N31.9 NEUROGENIC BLADDER: ICD-10-CM

## 2025-05-28 LAB
ALBUMIN SERPL-MCNC: 4.4 G/DL (ref 3.6–5.1)
ALP SERPL-CCNC: 97 U/L (ref 36–130)
ALT SERPL-CCNC: 24 U/L (ref 9–46)
ANION GAP SERPL CALCULATED.4IONS-SCNC: 12 MMOL/L (CALC) (ref 7–17)
AST SERPL-CCNC: 21 U/L (ref 10–40)
BILIRUB SERPL-MCNC: 0.9 MG/DL (ref 0.2–1.2)
BUN SERPL-MCNC: 10 MG/DL (ref 7–25)
CALCIUM SERPL-MCNC: 9.3 MG/DL (ref 8.6–10.3)
CHLORIDE SERPL-SCNC: 102 MMOL/L (ref 98–110)
CHOLEST SERPL-MCNC: 202 MG/DL
CHOLEST/HDLC SERPL: 4.3 (CALC)
CO2 SERPL-SCNC: 24 MMOL/L (ref 20–32)
CREAT SERPL-MCNC: 0.52 MG/DL (ref 0.6–1.24)
EGFRCR SERPLBLD CKD-EPI 2021: 145 ML/MIN/1.73M2
ERYTHROCYTE [DISTWIDTH] IN BLOOD BY AUTOMATED COUNT: 13 % (ref 11–15)
GLUCOSE SERPL-MCNC: 93 MG/DL (ref 65–99)
HCT VFR BLD AUTO: 46.5 % (ref 38.5–50)
HDLC SERPL-MCNC: 47 MG/DL
HGB BLD-MCNC: 15.2 G/DL (ref 13.2–17.1)
LDLC SERPL CALC-MCNC: 125 MG/DL (CALC)
MAGNESIUM SERPL-MCNC: 2.2 MG/DL (ref 1.5–2.5)
MCH RBC QN AUTO: 29.7 PG (ref 27–33)
MCHC RBC AUTO-ENTMCNC: 32.7 G/DL (ref 32–36)
MCV RBC AUTO: 91 FL (ref 80–100)
NONHDLC SERPL-MCNC: 155 MG/DL (CALC)
PLATELET # BLD AUTO: 239 THOUSAND/UL (ref 140–400)
PMV BLD REES-ECKER: 10.2 FL (ref 7.5–12.5)
POTASSIUM SERPL-SCNC: 3.8 MMOL/L (ref 3.5–5.3)
PROT SERPL-MCNC: 7 G/DL (ref 6.1–8.1)
RBC # BLD AUTO: 5.11 MILLION/UL (ref 4.2–5.8)
SODIUM SERPL-SCNC: 138 MMOL/L (ref 135–146)
TRIGL SERPL-MCNC: 183 MG/DL
TSH SERPL-ACNC: 2.04 MIU/L (ref 0.4–4.5)
WBC # BLD AUTO: 8.4 THOUSAND/UL (ref 3.8–10.8)

## 2025-06-02 ENCOUNTER — TELEPHONE (OUTPATIENT)
Dept: ORTHOPEDIC SURGERY | Facility: HOSPITAL | Age: 25
End: 2025-06-02
Payer: COMMERCIAL

## 2025-06-02 NOTE — TELEPHONE ENCOUNTER
SYMPTOM PHONE CALL    Name of Patient: Jose Fernandez  Parent or Guardian's Name: Lakesha- Mom       Reason for Call: Pump Concerns     Additional Information: Mom lvm that patient has been having a lot of issues with his pump. Would like a call back to discuss concerns.     Call Back Number: [phone]   Previous Visit: Date 3/27/25 With Waldron   Date of Next Visit: Date 9/11/25  With Rosemary

## 2025-06-05 ENCOUNTER — APPOINTMENT (OUTPATIENT)
Dept: VASCULAR MEDICINE | Facility: CLINIC | Age: 25
End: 2025-06-05
Payer: COMMERCIAL

## 2025-06-05 ENCOUNTER — OFFICE VISIT (OUTPATIENT)
Dept: ORTHOPEDIC SURGERY | Facility: CLINIC | Age: 25
End: 2025-06-05
Payer: COMMERCIAL

## 2025-06-05 DIAGNOSIS — G95.11 SPINAL CORD INFARCTION (MULTI): ICD-10-CM

## 2025-06-05 DIAGNOSIS — G82.50 QUADRIPLEGIA AND QUADRIPARESIS (MULTI): Primary | ICD-10-CM

## 2025-06-05 PROCEDURE — 99212 OFFICE O/P EST SF 10 MIN: CPT | Mod: 25 | Performed by: ORTHOPAEDIC SURGERY

## 2025-06-05 PROCEDURE — 62367 ANALYZE SPINE INFUS PUMP: CPT | Performed by: ORTHOPAEDIC SURGERY

## 2025-06-05 RX ORDER — BACLOFEN 20 MG/1
40 TABLET ORAL 3 TIMES DAILY
Qty: 180 TABLET | Refills: 11 | Status: ON HOLD | OUTPATIENT
Start: 2025-06-05 | End: 2026-06-05

## 2025-06-05 ASSESSMENT — PAIN - FUNCTIONAL ASSESSMENT: PAIN_FUNCTIONAL_ASSESSMENT: NO/DENIES PAIN

## 2025-06-05 NOTE — PROGRESS NOTES
Subjective   Patient ID: Jose Fernandez is a 25 y.o. male who presents for baclofen concern.    History of Present Illness  The patient presents for evaluation of muscle twitching, weight gain, and heartburn.    He has been experiencing muscle twitching, which has been particularly severe this week. The twitching is so intense that it disrupts his sleep, necessitating positional adjustments. He also experiences difficulty in maintaining a seated position on the toilet, requiring an hour to achieve comfort. His symptoms are exacerbated when his bladder is full or when he needs to defecate. He also reports episodes of overheating, akin to fever, which necessitate the removal of his shirt. Despite administering Oxycodone, there was no significant relief. He reports no current kidney stones or intra-abdominal infections. He has been consuming large amounts of water and urinating frequently. He has been experiencing issues with his wheelchair, suspecting improper positioning as a contributing factor to his symptoms. He has been taking oral baclofen 30 mg 3 times daily, but this has not provided relief. A trial of diazepam was also ineffective. He has a history of urinary tract infection (UTI) about a month ago, for which he was treated with antibiotics.    He has a history of reflux and is currently on Prilosec daily, having recently switched from Pepcid. He reported heartburn upon waking this morning, but this has since resolved.    He has gained weight over the past year and is actively working on weight management. Recent blood work revealed elevated cholesterol levels, but normal white and red blood cell counts.    PAST SURGICAL HISTORY:  Nissen fundoplication in infancy  Fusion surgery    SOCIAL HISTORY  Occupations: Works at a [employer]    Previous HPI:  History: 23 y.o. 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 Pump originally implanted on 4/16/19. Since most recent visit last June, he has been having more frequent episodes of spasticity in his legs. This has been especially evident while trying to use the restroom since receiving botox injection into his bladder in October. Otherwise he is doing well, continues to work at the [employer] and has started driving.       ROS: A 16 system review is negative for all items except as in the HPI.     Objective     There were no vitals taken for this visit.     Physical Exam  General: Well developed, well nourished male in no acute distress.  Skin: The skin is intact with no evidence of abrasions, bruises, or swelling. I can still see the deep sutures, but there are no signs of infection.  Vascular: There are 2+ pulses in both upper extremities and brisk capillary refill.  Neuro: The light touch sensation is mostly intact in both arms, but there are some less sensitive areas on the right secondary to the spinal cord injury. The AIN/PIN/Ulnar nerve function is intact in his left arm.  He had a few episodes of spasticity while we are in the room, mostly in his legs.       Results  Labs   - Cholesterol: High   - White Blood Cells: Normal   - Red Blood Cells: Normal           Assessment & Plan  1. Muscle twitching:  The muscle twitching may be due to an issue with the catheter, which was previously noted to be sluggish during replacement. The possibility of a kinked or clogged catheter was discussed. Increase baclofen dosage to 40 mg 3 times daily. A procedure to check the catheter flow will be scheduled, requiring an overnight hospital stay. If the spinal segment is not flowing, a new spinal segment will be inserted.    Because he is being worked up for potential other sources of spasticity, his workup was really supposed to include a C-reactive protein, sed rate, and hemoglobin A1c.  Somehow these orders have not been completed so I put in new ones today.    Follow-up:  Schedule procedure to check catheter flow after vacation in July.      Yoko Riley MD     This medical note was created with the assistance of artificial intelligence (AI) for documentation purposes. The content has been reviewed and confirmed by the healthcare provider for accuracy and completeness. Patient consented to the use of audio recording and use of AI during their visit.

## 2025-06-06 ENCOUNTER — HOSPITAL ENCOUNTER (INPATIENT)
Facility: HOSPITAL | Age: 25
End: 2025-06-06
Attending: STUDENT IN AN ORGANIZED HEALTH CARE EDUCATION/TRAINING PROGRAM | Admitting: STUDENT IN AN ORGANIZED HEALTH CARE EDUCATION/TRAINING PROGRAM
Payer: COMMERCIAL

## 2025-06-06 ENCOUNTER — HOSPITAL ENCOUNTER (EMERGENCY)
Facility: HOSPITAL | Age: 25
Discharge: HOME | End: 2025-06-06
Payer: COMMERCIAL

## 2025-06-06 ENCOUNTER — APPOINTMENT (OUTPATIENT)
Dept: RADIOLOGY | Facility: HOSPITAL | Age: 25
DRG: 689 | End: 2025-06-06
Payer: COMMERCIAL

## 2025-06-06 ENCOUNTER — HOSPITAL ENCOUNTER (OUTPATIENT)
Facility: HOSPITAL | Age: 25
Setting detail: SURGERY ADMIT
End: 2025-06-06
Attending: ORTHOPAEDIC SURGERY | Admitting: ORTHOPAEDIC SURGERY
Payer: COMMERCIAL

## 2025-06-06 VITALS
SYSTOLIC BLOOD PRESSURE: 142 MMHG | OXYGEN SATURATION: 95 % | HEART RATE: 105 BPM | BODY MASS INDEX: 33.47 KG/M2 | RESPIRATION RATE: 15 BRPM | TEMPERATURE: 98.5 F | DIASTOLIC BLOOD PRESSURE: 90 MMHG | WEIGHT: 226 LBS | HEIGHT: 69 IN

## 2025-06-06 DIAGNOSIS — K21.00 GASTROESOPHAGEAL REFLUX DISEASE WITH ESOPHAGITIS, UNSPECIFIED WHETHER HEMORRHAGE: ICD-10-CM

## 2025-06-06 DIAGNOSIS — N30.00 ACUTE CYSTITIS WITHOUT HEMATURIA: Primary | ICD-10-CM

## 2025-06-06 DIAGNOSIS — K59.09 CONSTIPATION, CHRONIC: ICD-10-CM

## 2025-06-06 DIAGNOSIS — K21.9 GASTROESOPHAGEAL REFLUX DISEASE WITHOUT ESOPHAGITIS: ICD-10-CM

## 2025-06-06 DIAGNOSIS — N30.90 CYSTITIS: Primary | ICD-10-CM

## 2025-06-06 DIAGNOSIS — N12 PYELONEPHRITIS: ICD-10-CM

## 2025-06-06 DIAGNOSIS — I10 PRIMARY HYPERTENSION: ICD-10-CM

## 2025-06-06 DIAGNOSIS — G82.50 QUADRIPLEGIA AND QUADRIPARESIS (MULTI): ICD-10-CM

## 2025-06-06 LAB
ALBUMIN SERPL BCP-MCNC: 4.3 G/DL (ref 3.4–5)
ALP SERPL-CCNC: 79 U/L (ref 33–120)
ALT SERPL W P-5'-P-CCNC: 35 U/L (ref 10–52)
ANION GAP SERPL CALC-SCNC: 13 MMOL/L (ref 10–20)
APPEARANCE UR: ABNORMAL
AST SERPL W P-5'-P-CCNC: 32 U/L (ref 9–39)
BACTERIA #/AREA URNS AUTO: ABNORMAL /HPF
BASOPHILS # BLD AUTO: 0.07 X10*3/UL (ref 0–0.1)
BASOPHILS NFR BLD AUTO: 0.7 %
BILIRUB SERPL-MCNC: 0.7 MG/DL (ref 0–1.2)
BILIRUB UR STRIP.AUTO-MCNC: NEGATIVE MG/DL
BUN SERPL-MCNC: 12 MG/DL (ref 6–23)
CALCIUM SERPL-MCNC: 9 MG/DL (ref 8.6–10.3)
CHLORIDE SERPL-SCNC: 105 MMOL/L (ref 98–107)
CO2 SERPL-SCNC: 25 MMOL/L (ref 21–32)
COLOR UR: YELLOW
CREAT SERPL-MCNC: 0.54 MG/DL (ref 0.5–1.3)
CRP SERPL-MCNC: 4.5 MG/DL
CRP SERPL-MCNC: 67.9 MG/L
EGFRCR SERPLBLD CKD-EPI 2021: >90 ML/MIN/1.73M*2
EOSINOPHIL # BLD AUTO: 0.15 X10*3/UL (ref 0–0.7)
EOSINOPHIL NFR BLD AUTO: 1.5 %
ERYTHROCYTE [DISTWIDTH] IN BLOOD BY AUTOMATED COUNT: 12.7 % (ref 11.5–14.5)
ERYTHROCYTE [SEDIMENTATION RATE] IN BLOOD BY WESTERGREN METHOD: 25 MM/H (ref 0–15)
ERYTHROCYTE [SEDIMENTATION RATE] IN BLOOD BY WESTERGREN METHOD: 29 MM/H
EST. AVERAGE GLUCOSE BLD GHB EST-MCNC: 94 MG/DL
EST. AVERAGE GLUCOSE BLD GHB EST-SCNC: 5.2 MMOL/L
GLUCOSE SERPL-MCNC: 125 MG/DL (ref 74–99)
GLUCOSE UR STRIP.AUTO-MCNC: NORMAL MG/DL
HBA1C MFR BLD: 4.9 %
HCT VFR BLD AUTO: 45.4 % (ref 41–52)
HGB BLD-MCNC: 15.5 G/DL (ref 13.5–17.5)
HYALINE CASTS #/AREA URNS AUTO: ABNORMAL /LPF
IMM GRANULOCYTES # BLD AUTO: 0.05 X10*3/UL (ref 0–0.7)
IMM GRANULOCYTES NFR BLD AUTO: 0.5 % (ref 0–0.9)
KETONES UR STRIP.AUTO-MCNC: NEGATIVE MG/DL
LACTATE SERPL-SCNC: 1.9 MMOL/L (ref 0.4–2)
LEUKOCYTE ESTERASE UR QL STRIP.AUTO: ABNORMAL
LYMPHOCYTES # BLD AUTO: 2.73 X10*3/UL (ref 1.2–4.8)
LYMPHOCYTES NFR BLD AUTO: 27 %
MCH RBC QN AUTO: 30.3 PG (ref 26–34)
MCHC RBC AUTO-ENTMCNC: 34.1 G/DL (ref 32–36)
MCV RBC AUTO: 89 FL (ref 80–100)
MONOCYTES # BLD AUTO: 0.76 X10*3/UL (ref 0.1–1)
MONOCYTES NFR BLD AUTO: 7.5 %
MUCOUS THREADS #/AREA URNS AUTO: ABNORMAL /LPF
NEUTROPHILS # BLD AUTO: 6.36 X10*3/UL (ref 1.2–7.7)
NEUTROPHILS NFR BLD AUTO: 62.8 %
NITRITE UR QL STRIP.AUTO: ABNORMAL
NRBC BLD-RTO: 0 /100 WBCS (ref 0–0)
PH UR STRIP.AUTO: 6 [PH]
PLATELET # BLD AUTO: 332 X10*3/UL (ref 150–450)
POTASSIUM SERPL-SCNC: 3.4 MMOL/L (ref 3.5–5.3)
PROT SERPL-MCNC: 7.1 G/DL (ref 6.4–8.2)
PROT UR STRIP.AUTO-MCNC: ABNORMAL MG/DL
RBC # BLD AUTO: 5.12 X10*6/UL (ref 4.5–5.9)
RBC # UR STRIP.AUTO: ABNORMAL MG/DL
RBC #/AREA URNS AUTO: ABNORMAL /HPF
SODIUM SERPL-SCNC: 140 MMOL/L (ref 136–145)
SP GR UR STRIP.AUTO: 1.02
SQUAMOUS #/AREA URNS AUTO: ABNORMAL /HPF
UROBILINOGEN UR STRIP.AUTO-MCNC: ABNORMAL MG/DL
WBC # BLD AUTO: 10.1 X10*3/UL (ref 4.4–11.3)
WBC #/AREA URNS AUTO: >50 /HPF
WBC CLUMPS #/AREA URNS AUTO: ABNORMAL /HPF

## 2025-06-06 PROCEDURE — 85652 RBC SED RATE AUTOMATED: CPT

## 2025-06-06 PROCEDURE — 99285 EMERGENCY DEPT VISIT HI MDM: CPT | Mod: 25 | Performed by: STUDENT IN AN ORGANIZED HEALTH CARE EDUCATION/TRAINING PROGRAM

## 2025-06-06 PROCEDURE — 83605 ASSAY OF LACTIC ACID: CPT

## 2025-06-06 PROCEDURE — 71260 CT THORAX DX C+: CPT | Performed by: SURGERY

## 2025-06-06 PROCEDURE — 72128 CT CHEST SPINE W/O DYE: CPT | Performed by: SURGERY

## 2025-06-06 PROCEDURE — 87040 BLOOD CULTURE FOR BACTERIA: CPT | Mod: STJLAB

## 2025-06-06 PROCEDURE — 36415 COLL VENOUS BLD VENIPUNCTURE: CPT

## 2025-06-06 PROCEDURE — 2500000004 HC RX 250 GENERAL PHARMACY W/ HCPCS (ALT 636 FOR OP/ED)

## 2025-06-06 PROCEDURE — 74177 CT ABD & PELVIS W/CONTRAST: CPT | Performed by: SURGERY

## 2025-06-06 PROCEDURE — 86140 C-REACTIVE PROTEIN: CPT

## 2025-06-06 PROCEDURE — 80053 COMPREHEN METABOLIC PANEL: CPT

## 2025-06-06 PROCEDURE — 72126 CT NECK SPINE W/DYE: CPT

## 2025-06-06 PROCEDURE — 2550000001 HC RX 255 CONTRASTS: Performed by: STUDENT IN AN ORGANIZED HEALTH CARE EDUCATION/TRAINING PROGRAM

## 2025-06-06 PROCEDURE — 72131 CT LUMBAR SPINE W/O DYE: CPT | Performed by: SURGERY

## 2025-06-06 PROCEDURE — 87075 CULTR BACTERIA EXCEPT BLOOD: CPT | Mod: STJLAB

## 2025-06-06 PROCEDURE — 4500999001 HC ED NO CHARGE

## 2025-06-06 PROCEDURE — 87086 URINE CULTURE/COLONY COUNT: CPT | Mod: STJLAB | Performed by: STUDENT IN AN ORGANIZED HEALTH CARE EDUCATION/TRAINING PROGRAM

## 2025-06-06 PROCEDURE — 85025 COMPLETE CBC W/AUTO DIFF WBC: CPT

## 2025-06-06 PROCEDURE — 74177 CT ABD & PELVIS W/CONTRAST: CPT

## 2025-06-06 PROCEDURE — 96365 THER/PROPH/DIAG IV INF INIT: CPT

## 2025-06-06 PROCEDURE — 84075 ASSAY ALKALINE PHOSPHATASE: CPT

## 2025-06-06 PROCEDURE — 81001 URINALYSIS AUTO W/SCOPE: CPT

## 2025-06-06 RX ADMIN — PIPERACILLIN SODIUM AND TAZOBACTAM SODIUM 3.38 G: 3; .375 INJECTION, SOLUTION INTRAVENOUS at 22:39

## 2025-06-06 RX ADMIN — IOHEXOL 75 ML: 350 INJECTION, SOLUTION INTRAVENOUS at 23:04

## 2025-06-06 ASSESSMENT — PAIN - FUNCTIONAL ASSESSMENT
PAIN_FUNCTIONAL_ASSESSMENT: 0-10
PAIN_FUNCTIONAL_ASSESSMENT: 0-10

## 2025-06-06 ASSESSMENT — PAIN SCALES - GENERAL
PAINLEVEL_OUTOF10: 0 - NO PAIN

## 2025-06-06 ASSESSMENT — COLUMBIA-SUICIDE SEVERITY RATING SCALE - C-SSRS
2. HAVE YOU ACTUALLY HAD ANY THOUGHTS OF KILLING YOURSELF?: NO
1. IN THE PAST MONTH, HAVE YOU WISHED YOU WERE DEAD OR WISHED YOU COULD GO TO SLEEP AND NOT WAKE UP?: NO
2. HAVE YOU ACTUALLY HAD ANY THOUGHTS OF KILLING YOURSELF?: NO
6. HAVE YOU EVER DONE ANYTHING, STARTED TO DO ANYTHING, OR PREPARED TO DO ANYTHING TO END YOUR LIFE?: NO
6. HAVE YOU EVER DONE ANYTHING, STARTED TO DO ANYTHING, OR PREPARED TO DO ANYTHING TO END YOUR LIFE?: NO
1. IN THE PAST MONTH, HAVE YOU WISHED YOU WERE DEAD OR WISHED YOU COULD GO TO SLEEP AND NOT WAKE UP?: NO

## 2025-06-06 NOTE — ED TRIAGE NOTES
Pt presented to ED for c/o abnormal C-reactive and sed rate on out patient labs yesterday. Sent in for workup of infection due to baclofen pump replaced in march.

## 2025-06-07 PROBLEM — N30.90 CYSTITIS: Status: ACTIVE | Noted: 2025-06-07

## 2025-06-07 LAB
ALBUMIN SERPL BCP-MCNC: 4 G/DL (ref 3.4–5)
ANION GAP SERPL CALC-SCNC: 15 MMOL/L (ref 10–20)
APPEARANCE UR: CLEAR
BILIRUB UR STRIP.AUTO-MCNC: NEGATIVE MG/DL
BUN SERPL-MCNC: 14 MG/DL (ref 6–23)
CALCIUM SERPL-MCNC: 8.8 MG/DL (ref 8.6–10.3)
CHLORIDE SERPL-SCNC: 103 MMOL/L (ref 98–107)
CO2 SERPL-SCNC: 25 MMOL/L (ref 21–32)
COLOR UR: ABNORMAL
CREAT SERPL-MCNC: 0.57 MG/DL (ref 0.5–1.3)
EGFRCR SERPLBLD CKD-EPI 2021: >90 ML/MIN/1.73M*2
ERYTHROCYTE [DISTWIDTH] IN BLOOD BY AUTOMATED COUNT: 12.8 % (ref 11.5–14.5)
GLUCOSE SERPL-MCNC: 91 MG/DL (ref 74–99)
GLUCOSE UR STRIP.AUTO-MCNC: NORMAL MG/DL
HCT VFR BLD AUTO: 43.3 % (ref 41–52)
HGB BLD-MCNC: 14.4 G/DL (ref 13.5–17.5)
HOLD SPECIMEN: NORMAL
HYALINE CASTS #/AREA URNS AUTO: ABNORMAL /LPF
KETONES UR STRIP.AUTO-MCNC: NEGATIVE MG/DL
LEUKOCYTE ESTERASE UR QL STRIP.AUTO: NEGATIVE
MAGNESIUM SERPL-MCNC: 2.07 MG/DL (ref 1.6–2.4)
MCH RBC QN AUTO: 29.7 PG (ref 26–34)
MCHC RBC AUTO-ENTMCNC: 33.3 G/DL (ref 32–36)
MCV RBC AUTO: 89 FL (ref 80–100)
MUCOUS THREADS #/AREA URNS AUTO: ABNORMAL /LPF
NITRITE UR QL STRIP.AUTO: ABNORMAL
NRBC BLD-RTO: 0 /100 WBCS (ref 0–0)
PH UR STRIP.AUTO: 6 [PH]
PHOSPHATE SERPL-MCNC: 4 MG/DL (ref 2.5–4.9)
PLATELET # BLD AUTO: 309 X10*3/UL (ref 150–450)
POTASSIUM SERPL-SCNC: 3.6 MMOL/L (ref 3.5–5.3)
PROT UR STRIP.AUTO-MCNC: ABNORMAL MG/DL
RBC # BLD AUTO: 4.85 X10*6/UL (ref 4.5–5.9)
RBC # UR STRIP.AUTO: ABNORMAL MG/DL
RBC #/AREA URNS AUTO: ABNORMAL /HPF
SODIUM SERPL-SCNC: 139 MMOL/L (ref 136–145)
SP GR UR STRIP.AUTO: >1.05
UROBILINOGEN UR STRIP.AUTO-MCNC: NORMAL MG/DL
WBC # BLD AUTO: 10.9 X10*3/UL (ref 4.4–11.3)
WBC #/AREA URNS AUTO: ABNORMAL /HPF

## 2025-06-07 PROCEDURE — 80069 RENAL FUNCTION PANEL: CPT

## 2025-06-07 PROCEDURE — 83735 ASSAY OF MAGNESIUM: CPT

## 2025-06-07 PROCEDURE — 2500000001 HC RX 250 WO HCPCS SELF ADMINISTERED DRUGS (ALT 637 FOR MEDICARE OP)

## 2025-06-07 PROCEDURE — 36415 COLL VENOUS BLD VENIPUNCTURE: CPT

## 2025-06-07 PROCEDURE — 85027 COMPLETE CBC AUTOMATED: CPT

## 2025-06-07 PROCEDURE — 2500000004 HC RX 250 GENERAL PHARMACY W/ HCPCS (ALT 636 FOR OP/ED)

## 2025-06-07 PROCEDURE — 94640 AIRWAY INHALATION TREATMENT: CPT

## 2025-06-07 PROCEDURE — 1200000002 HC GENERAL ROOM WITH TELEMETRY DAILY

## 2025-06-07 PROCEDURE — 87086 URINE CULTURE/COLONY COUNT: CPT | Mod: STJLAB

## 2025-06-07 PROCEDURE — 81001 URINALYSIS AUTO W/SCOPE: CPT

## 2025-06-07 PROCEDURE — 99223 1ST HOSP IP/OBS HIGH 75: CPT

## 2025-06-07 PROCEDURE — 2500000002 HC RX 250 W HCPCS SELF ADMINISTERED DRUGS (ALT 637 FOR MEDICARE OP, ALT 636 FOR OP/ED)

## 2025-06-07 RX ORDER — BACLOFEN 10 MG/1
40 TABLET ORAL 3 TIMES DAILY
Status: DISPENSED | OUTPATIENT
Start: 2025-06-07

## 2025-06-07 RX ORDER — CHOLECALCIFEROL (VITAMIN D3) 25 MCG
50 TABLET ORAL DAILY
Status: DISPENSED | OUTPATIENT
Start: 2025-06-07

## 2025-06-07 RX ORDER — MONTELUKAST SODIUM 10 MG/1
10 TABLET ORAL DAILY
Status: DISPENSED | OUTPATIENT
Start: 2025-06-07

## 2025-06-07 RX ORDER — DOCUSATE SODIUM 100 MG/1
200 CAPSULE, LIQUID FILLED ORAL DAILY
Status: DISPENSED | OUTPATIENT
Start: 2025-06-07

## 2025-06-07 RX ORDER — POLYETHYLENE GLYCOL 3350 17 G/17G
17 POWDER, FOR SOLUTION ORAL DAILY
Status: DISPENSED | OUTPATIENT
Start: 2025-06-07

## 2025-06-07 RX ORDER — FERROUS SULFATE 325(65) MG
65 TABLET ORAL DAILY
Status: DISPENSED | OUTPATIENT
Start: 2025-06-07

## 2025-06-07 RX ORDER — OXYBUTYNIN CHLORIDE 5 MG/1
5 TABLET ORAL 3 TIMES DAILY
Status: DISPENSED | OUTPATIENT
Start: 2025-06-07

## 2025-06-07 RX ORDER — PANTOPRAZOLE SODIUM 40 MG/1
40 TABLET, DELAYED RELEASE ORAL
Status: DISPENSED | OUTPATIENT
Start: 2025-06-08

## 2025-06-07 RX ORDER — BUDESONIDE 0.5 MG/2ML
1 INHALANT ORAL
Status: DISPENSED | OUTPATIENT
Start: 2025-06-07

## 2025-06-07 RX ORDER — POTASSIUM CHLORIDE 1.5 G/1.58G
40 POWDER, FOR SOLUTION ORAL ONCE
Status: COMPLETED | OUTPATIENT
Start: 2025-06-07 | End: 2025-06-07

## 2025-06-07 RX ORDER — ALBUTEROL SULFATE 0.83 MG/ML
2.5 SOLUTION RESPIRATORY (INHALATION) EVERY 4 HOURS PRN
Status: DISCONTINUED | OUTPATIENT
Start: 2025-06-07 | End: 2025-06-08

## 2025-06-07 RX ORDER — HYDROCHLOROTHIAZIDE 25 MG/1
25 TABLET ORAL DAILY
Status: DISPENSED | OUTPATIENT
Start: 2025-06-07

## 2025-06-07 RX ORDER — ENOXAPARIN SODIUM 100 MG/ML
40 INJECTION SUBCUTANEOUS EVERY 24 HOURS
Status: DISPENSED | OUTPATIENT
Start: 2025-06-07

## 2025-06-07 RX ADMIN — BACLOFEN 40 MG: 10 TABLET ORAL at 14:59

## 2025-06-07 RX ADMIN — FERROUS SULFATE TAB 325 MG (65 MG ELEMENTAL FE) 1 TABLET: 325 (65 FE) TAB at 12:03

## 2025-06-07 RX ADMIN — BUDESONIDE 1 MG: 0.5 INHALANT RESPIRATORY (INHALATION) at 20:38

## 2025-06-07 RX ADMIN — DOCUSATE SODIUM 200 MG: 100 CAPSULE, LIQUID FILLED ORAL at 12:03

## 2025-06-07 RX ADMIN — HYDROCHLOROTHIAZIDE 25 MG: 25 TABLET ORAL at 12:03

## 2025-06-07 RX ADMIN — POTASSIUM CHLORIDE 40 MEQ: 1.5 POWDER, FOR SOLUTION ORAL at 06:21

## 2025-06-07 RX ADMIN — OXYBUTYNIN CHLORIDE 5 MG: 5 TABLET ORAL at 21:17

## 2025-06-07 RX ADMIN — PIPERACILLIN SODIUM AND TAZOBACTAM SODIUM 3.38 G: 3; .375 INJECTION, SOLUTION INTRAVENOUS at 11:36

## 2025-06-07 RX ADMIN — PIPERACILLIN SODIUM AND TAZOBACTAM SODIUM 3.38 G: 3; .375 INJECTION, SOLUTION INTRAVENOUS at 03:38

## 2025-06-07 RX ADMIN — MONTELUKAST 10 MG: 10 TABLET, FILM COATED ORAL at 12:03

## 2025-06-07 RX ADMIN — Medication 50 MCG: at 12:03

## 2025-06-07 RX ADMIN — POLYETHYLENE GLYCOL 3350 17 G: 17 POWDER, FOR SOLUTION ORAL at 12:03

## 2025-06-07 RX ADMIN — ALBUTEROL SULFATE 2.5 MG: 2.5 SOLUTION RESPIRATORY (INHALATION) at 20:38

## 2025-06-07 RX ADMIN — BACLOFEN 40 MG: 10 TABLET ORAL at 21:17

## 2025-06-07 RX ADMIN — PIPERACILLIN SODIUM AND TAZOBACTAM SODIUM 3.38 G: 3; .375 INJECTION, SOLUTION INTRAVENOUS at 21:18

## 2025-06-07 RX ADMIN — OXYBUTYNIN CHLORIDE 5 MG: 5 TABLET ORAL at 14:59

## 2025-06-07 RX ADMIN — ENOXAPARIN SODIUM 40 MG: 40 INJECTION SUBCUTANEOUS at 03:25

## 2025-06-07 SDOH — SOCIAL STABILITY: SOCIAL INSECURITY
WITHIN THE LAST YEAR, HAVE YOU BEEN RAPED OR FORCED TO HAVE ANY KIND OF SEXUAL ACTIVITY BY YOUR PARTNER OR EX-PARTNER?: NO

## 2025-06-07 SDOH — SOCIAL STABILITY: SOCIAL INSECURITY: WITHIN THE LAST YEAR, HAVE YOU BEEN HUMILIATED OR EMOTIONALLY ABUSED IN OTHER WAYS BY YOUR PARTNER OR EX-PARTNER?: NO

## 2025-06-07 SDOH — ECONOMIC STABILITY: FOOD INSECURITY: WITHIN THE PAST 12 MONTHS, THE FOOD YOU BOUGHT JUST DIDN'T LAST AND YOU DIDN'T HAVE MONEY TO GET MORE.: NEVER TRUE

## 2025-06-07 SDOH — SOCIAL STABILITY: SOCIAL INSECURITY: ARE THERE ANY APPARENT SIGNS OF INJURIES/BEHAVIORS THAT COULD BE RELATED TO ABUSE/NEGLECT?: NO

## 2025-06-07 SDOH — SOCIAL STABILITY: SOCIAL INSECURITY: WERE YOU ABLE TO COMPLETE ALL THE BEHAVIORAL HEALTH SCREENINGS?: YES

## 2025-06-07 SDOH — SOCIAL STABILITY: SOCIAL INSECURITY: ARE YOU OR HAVE YOU BEEN THREATENED OR ABUSED PHYSICALLY, EMOTIONALLY, OR SEXUALLY BY ANYONE?: NO

## 2025-06-07 SDOH — ECONOMIC STABILITY: HOUSING INSECURITY: IN THE LAST 12 MONTHS, WAS THERE A TIME WHEN YOU WERE NOT ABLE TO PAY THE MORTGAGE OR RENT ON TIME?: NO

## 2025-06-07 SDOH — SOCIAL STABILITY: SOCIAL INSECURITY: WITHIN THE LAST YEAR, HAVE YOU BEEN AFRAID OF YOUR PARTNER OR EX-PARTNER?: NO

## 2025-06-07 SDOH — ECONOMIC STABILITY: INCOME INSECURITY: IN THE PAST 12 MONTHS HAS THE ELECTRIC, GAS, OIL, OR WATER COMPANY THREATENED TO SHUT OFF SERVICES IN YOUR HOME?: NO

## 2025-06-07 SDOH — SOCIAL STABILITY: SOCIAL INSECURITY: HAS ANYONE EVER THREATENED TO HURT YOUR FAMILY OR YOUR PETS?: NO

## 2025-06-07 SDOH — ECONOMIC STABILITY: TRANSPORTATION INSECURITY: IN THE PAST 12 MONTHS, HAS LACK OF TRANSPORTATION KEPT YOU FROM MEDICAL APPOINTMENTS OR FROM GETTING MEDICATIONS?: NO

## 2025-06-07 SDOH — SOCIAL STABILITY: SOCIAL INSECURITY
WITHIN THE LAST YEAR, HAVE YOU BEEN KICKED, HIT, SLAPPED, OR OTHERWISE PHYSICALLY HURT BY YOUR PARTNER OR EX-PARTNER?: NO

## 2025-06-07 SDOH — ECONOMIC STABILITY: HOUSING INSECURITY: IN THE PAST 12 MONTHS, HOW MANY TIMES HAVE YOU MOVED WHERE YOU WERE LIVING?: 1

## 2025-06-07 SDOH — ECONOMIC STABILITY: HOUSING INSECURITY: AT ANY TIME IN THE PAST 12 MONTHS, WERE YOU HOMELESS OR LIVING IN A SHELTER (INCLUDING NOW)?: NO

## 2025-06-07 SDOH — ECONOMIC STABILITY: FOOD INSECURITY: WITHIN THE PAST 12 MONTHS, YOU WORRIED THAT YOUR FOOD WOULD RUN OUT BEFORE YOU GOT THE MONEY TO BUY MORE.: NEVER TRUE

## 2025-06-07 SDOH — ECONOMIC STABILITY: FOOD INSECURITY: HOW HARD IS IT FOR YOU TO PAY FOR THE VERY BASICS LIKE FOOD, HOUSING, MEDICAL CARE, AND HEATING?: NOT HARD AT ALL

## 2025-06-07 SDOH — SOCIAL STABILITY: SOCIAL INSECURITY: DO YOU FEEL ANYONE HAS EXPLOITED OR TAKEN ADVANTAGE OF YOU FINANCIALLY OR OF YOUR PERSONAL PROPERTY?: NO

## 2025-06-07 SDOH — SOCIAL STABILITY: SOCIAL INSECURITY: HAVE YOU HAD THOUGHTS OF HARMING ANYONE ELSE?: NO

## 2025-06-07 SDOH — SOCIAL STABILITY: SOCIAL INSECURITY: DO YOU FEEL UNSAFE GOING BACK TO THE PLACE WHERE YOU ARE LIVING?: NO

## 2025-06-07 SDOH — SOCIAL STABILITY: SOCIAL INSECURITY: ABUSE: ADULT

## 2025-06-07 SDOH — SOCIAL STABILITY: SOCIAL INSECURITY: DOES ANYONE TRY TO KEEP YOU FROM HAVING/CONTACTING OTHER FRIENDS OR DOING THINGS OUTSIDE YOUR HOME?: NO

## 2025-06-07 SDOH — SOCIAL STABILITY: SOCIAL INSECURITY: HAVE YOU HAD ANY THOUGHTS OF HARMING ANYONE ELSE?: NO

## 2025-06-07 ASSESSMENT — COGNITIVE AND FUNCTIONAL STATUS - GENERAL
DRESSING REGULAR UPPER BODY CLOTHING: A LOT
PATIENT BASELINE BEDBOUND: NO
PERSONAL GROOMING: A LITTLE
WALKING IN HOSPITAL ROOM: TOTAL
STANDING UP FROM CHAIR USING ARMS: TOTAL
MOBILITY SCORE: 10
TURNING FROM BACK TO SIDE WHILE IN FLAT BAD: A LITTLE
STANDING UP FROM CHAIR USING ARMS: TOTAL
DRESSING REGULAR UPPER BODY CLOTHING: A LOT
DRESSING REGULAR LOWER BODY CLOTHING: A LOT
CLIMB 3 TO 5 STEPS WITH RAILING: TOTAL
TOILETING: A LOT
HELP NEEDED FOR BATHING: A LOT
DAILY ACTIVITIY SCORE: 15
MOVING FROM LYING ON BACK TO SITTING ON SIDE OF FLAT BED WITH BEDRAILS: A LITTLE
PERSONAL GROOMING: A LITTLE
CLIMB 3 TO 5 STEPS WITH RAILING: TOTAL
MOVING FROM LYING ON BACK TO SITTING ON SIDE OF FLAT BED WITH BEDRAILS: A LITTLE
MOBILITY SCORE: 10
TURNING FROM BACK TO SIDE WHILE IN FLAT BAD: A LITTLE
WALKING IN HOSPITAL ROOM: TOTAL
MOVING TO AND FROM BED TO CHAIR: TOTAL
HELP NEEDED FOR BATHING: A LOT
DRESSING REGULAR LOWER BODY CLOTHING: A LOT
MOVING TO AND FROM BED TO CHAIR: TOTAL
TOILETING: A LOT
DAILY ACTIVITIY SCORE: 15

## 2025-06-07 ASSESSMENT — ACTIVITIES OF DAILY LIVING (ADL)
PATIENT'S MEMORY ADEQUATE TO SAFELY COMPLETE DAILY ACTIVITIES?: YES
HEARING - RIGHT EAR: FUNCTIONAL
ADEQUATE_TO_COMPLETE_ADL: YES
BATHING: NEEDS ASSISTANCE
LACK_OF_TRANSPORTATION: NO
ASSISTIVE_DEVICE: WHEELCHAIR
DRESSING YOURSELF: NEEDS ASSISTANCE
JUDGMENT_ADEQUATE_SAFELY_COMPLETE_DAILY_ACTIVITIES: YES
LACK_OF_TRANSPORTATION: NO
WALKS IN HOME: NEEDS ASSISTANCE
TOILETING: NEEDS ASSISTANCE
FEEDING YOURSELF: INDEPENDENT
GROOMING: NEEDS ASSISTANCE
HEARING - LEFT EAR: FUNCTIONAL

## 2025-06-07 ASSESSMENT — PATIENT HEALTH QUESTIONNAIRE - PHQ9
2. FEELING DOWN, DEPRESSED OR HOPELESS: NOT AT ALL
SUM OF ALL RESPONSES TO PHQ9 QUESTIONS 1 & 2: 0
1. LITTLE INTEREST OR PLEASURE IN DOING THINGS: NOT AT ALL

## 2025-06-07 ASSESSMENT — LIFESTYLE VARIABLES
SKIP TO QUESTIONS 9-10: 0
AUDIT-C TOTAL SCORE: 4
HOW OFTEN DO YOU HAVE 6 OR MORE DRINKS ON ONE OCCASION: LESS THAN MONTHLY
HOW MANY STANDARD DRINKS CONTAINING ALCOHOL DO YOU HAVE ON A TYPICAL DAY: 3 OR 4
HOW OFTEN DO YOU HAVE A DRINK CONTAINING ALCOHOL: 2-4 TIMES A MONTH
AUDIT-C TOTAL SCORE: 4

## 2025-06-07 ASSESSMENT — PAIN SCALES - GENERAL
PAINLEVEL_OUTOF10: 0 - NO PAIN
PAINLEVEL_OUTOF10: 0 - NO PAIN

## 2025-06-07 NOTE — CARE PLAN
The patient's goals for the shift include      The clinical goals for the shift include See plan of care      Problem: Pain - Adult  Goal: Verbalizes/displays adequate comfort level or baseline comfort level  Outcome: Progressing     Problem: Safety - Adult  Goal: Free from fall injury  Outcome: Progressing     Problem: Discharge Planning  Goal: Discharge to home or other facility with appropriate resources  Outcome: Progressing     Problem: Chronic Conditions and Co-morbidities  Goal: Patient's chronic conditions and co-morbidity symptoms are monitored and maintained or improved  Outcome: Progressing     Problem: Nutrition  Goal: Nutrient intake appropriate for maintaining nutritional needs  Outcome: Progressing     Problem: Fall/Injury  Goal: Not fall by end of shift  Outcome: Progressing  Goal: Be free from injury by end of the shift  Outcome: Progressing  Goal: Verbalize understanding of personal risk factors for fall in the hospital  Outcome: Progressing  Goal: Verbalize understanding of risk factor reduction measures to prevent injury from fall in the home  Outcome: Progressing  Goal: Use assistive devices by end of the shift  Outcome: Progressing  Goal: Pace activities to prevent fatigue by end of the shift  Outcome: Progressing     Problem: Skin  Goal: Decreased wound size/increased tissue granulation at next dressing change  Outcome: Progressing  Goal: Participates in plan/prevention/treatment measures  Outcome: Progressing  Goal: Prevent/manage excess moisture  Outcome: Progressing  Goal: Prevent/minimize sheer/friction injuries  Outcome: Progressing  Goal: Promote/optimize nutrition  Outcome: Progressing  Goal: Promote skin healing  Outcome: Progressing

## 2025-06-07 NOTE — H&P
Internal Medicine    Admission H&P      Patient Jose Fernandez PCP Troy Chiang DO    MRN [id number]  Admission Date 6/6/2025      Chief Complaint/Reason for Admission:  Jose is a 25 y.o. male who presented today with abnormal labs    Subjective    Subjective   History of Presenting Illness  Mr. Jose Fernandez is a 25 y.o. male with PMH significant for Klebesiella UTIs, C5-C7 incomplete quadriplegia d/t intrauterine hemorrhage with resultant spasticity s/p baclofen pump placement implanted on 4/16/19, neurogenic bowel and bladder s/p botox (06/06/24) who presented to [facility] from home with elevated CRP and ESR on outpatient labs. Patient seen and examined with mother and girlfriend at bedside in the emergency department and is a good historian.  Patient was found to have elevated CRP of 67.9 and ESR of 29 on outpatient labs ordered by his pediatric orthopedic surgeon Dr. Riley  while being worked up for worsening spasticity in his legs.  Patient reports he has been having muscle twitching better disrupting his sleep and leading him to adjust his position as well as sitting in his wheelchair for the past several weeks.  His spasticity has especially been worse within the past week.  Patient reports he has been smiling the past few days and had 3 episodes of subjective fevers at home.  He currently feels well without any acute complaints.  He denies chest pain, shortness of breath, fevers/chills since he presented to the ED, abdominal pain, nausea, vomiting, or generalized weakness. Of note, patient follows with urologist Dr. Vargas and recently followed up with them on 5/7, at which time he was recommended to complete a 10-day course of Augmentin twice daily.  Office visit note dated 5/7 mentions patient is to continue Macrodantin 50 mg daily for UTI suppression but Nitrofurantn was last prescribed in December 2024 per med dispense history.     ED course:  VS: /95  Pulse 79  Temp 36.4 °C  SpO2  96% on RA  RR 18  Labs: Fairly unremarkable CMP besides hypokalemia 3.4 and hyperglycemia 125, unremarkable CBC without leukocytosis, mildly elevated ESR 25, CRP 4.5, UA positive for 500 leukocyte esterase, greater than 50 WBC, 3+ bacteria,  Imaging:  -CT CAP with contrast showed no definite evidence of acute pathology in the chest. Platelike atelectasis in the right lower lobe. Mild cardiomegaly. Urinary bladder is incompletely distended and suboptimally evaluated. Suspected urothelial hyperemia and perivesical fat stranding, suspicious for acute cystitis. Otherwise, no definite evidence of acute pathology in the abdomen or pelvis. No evidence of acute thoracic or lumbar spine abnormality.  - CT cervical, thoracic, and lumbar spines showed no evidence of acute bony trauma to the cervical, thoracic, and lumbar spines.   Intervention: Zosyn, blood and urine cultures collected    Patient was admitted to [facility] without telemetry under teaching for further management of cystitis.    Past Medical History  Active Ambulatory Problems     Diagnosis Date Noted    AC joint derangement 03/08/2023    Appendico-vesicostomy in place (Multi) 03/08/2023    Asthma, mild persistent (HHS-HCC) 03/08/2023    Birth trauma with spinal injury (Multi) 03/08/2023    Bruising 03/08/2023    C5-C7 incomplete quadriplegia (Multi) 03/08/2023    Clonus 03/08/2023    Fibroepithelial papilloma 03/08/2023    Gastroesophageal reflux 03/08/2023    H/O spinal fusion 03/08/2023    Hemiparesis of right nondominant side due to nontraumatic intracerebral hemorrhage (Multi) 03/08/2023    Hip dysplasia (HHS-HCC) 03/08/2023    Myelomalacia (Multi) 03/08/2023    Neurogenic bladder 03/08/2023    Neurogenic bowel 03/08/2023    Neuromuscular scoliosis 03/08/2023    Cervical myopathy 03/08/2023    Neuromuscular weakness (Multi) 03/08/2023    Quadriplegia and quadriparesis (Multi) 03/08/2023    Restrictive lung disease 03/08/2023    S/P Nissen fundoplication (with  gastrostomy tube placement) 03/08/2023    Vitamin B12 deficiency 03/08/2023    Vitamin D deficiency 03/08/2023    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/08/2023    Chronic constipation 04/19/2023    Detrusor hyperreflexia 04/19/2023    Urinary incontinence with continuous leakage 04/19/2023    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    Expressive language disorder 11/29/2006    Muscle weakness (generalized) 09/04/2007    Spinal cord infarction (Multi) 06/27/2024    Recurrent cystitis 05/08/2025     Resolved Ambulatory Problems     Diagnosis Date Noted    Abnormal PFT 03/08/2023    Cough 03/08/2023    Bilateral edema of lower extremity 03/08/2023    Edema 03/08/2023    Intra-abdominal infection 03/08/2023    Spasticity 03/08/2023    Urinary retention 03/08/2023    Abscess 04/19/2023    Sepsis (Multi) 04/24/2023    Elevated erythrocyte sedimentation rate 05/22/2024    MSSA (methicillin susceptible Staphylococcus aureus) 05/02/2023     Past Medical History:   Diagnosis Date    Abnormal results of pulmonary function studies 03/21/2019    Acute infarction of spinal cord (embolic) (nonembolic) (Multi) 04/30/2020    Asthma     GERD (gastroesophageal reflux disease)     Hemiplegia (Multi)     History of transfusion     Immunocompromised     Intra-abdominal abscess (Multi)     Mitrofanoff appendicovesicostomy present (Multi)     Other conditions influencing health status 07/21/2013    Other conditions influencing health status 03/04/2022    Pectus excavatum 07/21/2013    Peritoneal abscess (Multi) 05/24/2021    Peritonitis     Personal history of diseases of the skin and subcutaneous tissue 05/17/2019    Personal history of diseases of the skin and subcutaneous tissue     Personal history of other diseases of the digestive system 04/13/2020    Personal history of other infectious and parasitic diseases 07/24/2019    Personal history of other infectious and parasitic diseases 09/24/2018    Reactive airway disease ([facility])      Recurrent UTI (urinary tract infection)     Respiratory insufficiency     Scoliosis, unspecified     Snoring     Tinea pedis 07/23/2019    Urinary tract infection     UTI (urinary tract infection)     Xerosis cutis 07/23/2019       Past Surgical History   Surgical History[1]    Social History  Social History[2]    Home Medication:  Prior to Admission medications    Medication Sig Start Date End Date Taking? Authorizing Provider   albuterol 2.5 mg /3 mL (0.083 %) nebulizer solution Take 3 mL (2.5 mg) by nebulization every 4 hours if needed for wheezing. Inhale one vial every 4-6 hours as needed for cough, wheezing and shortness of breath 1/27/25 5/21/25  Griselda Shea, APRN-CNP   baclofen (Lioresal) 20 mg tablet Take 2 tablets (40 mg) by mouth 3 times a day. 6/5/25 6/5/26  Yoko Riley MD   cholecalciferol (Vitamin D-3) 50 mcg (2,000 unit) capsule TAKE 1 CAPSULE Daily 6/17/22   Historical Provider, MD   diazePAM (Valium) 5 mg tablet Take 1 tablet (5 mg) by mouth every 8 hours if needed for anxiety or muscle spasms for up to 5 days. 3/13/25 3/18/25  Dotty Jiang APRN-CNP   docusate sodium 250 mg capsule Take by mouth once daily.    Historical Provider, MD   ferrous sulfate ER (Slow Fe) 142 mg ER tablet Take 1 tablet by mouth once daily. 2/5/19   Historical Provider, MD   hydroCHLOROthiazide (HYDRODiuril) 25 mg tablet Take 1 tablet (25 mg) by mouth once daily. 5/21/25 11/17/25  Troy Chiang DO   L. ACIDOPHILUS/BIFID. ANIMALIS ORAL Take 1 capsule by mouth once daily.    Historical Provider, MD   mirabegron (Myrbetriq) 50 mg tablet extended release 24 hr 24 hr tablet Take 1 tablet (50 mg) by mouth once daily. 5/8/25   Adam Vargas MD   mometasone (Asmanex Twisthaler) 220 mcg/ actuation (14) inhaler Inhale 2 puffs once daily as needed. Rinse mouth with water after use to reduce aftertaste and incidence of candidiasis. Do not swallow.    Historical Provider, MD   montelukast (Singulair)  "10 mg tablet Take 1 tablet (10 mg) by mouth once daily. 7/24/24 2/21/25  PATRICK Youssef   mupirocin (Bactroban) 2 % ointment APPLY AS DIRECTED TWICE DAILY TO THE INSIDES OF BOTH NOSTRILS WITH A COTTON SWAB FOR 5 DAYS PRIOR TO SURGERY 4/30/25   Yoko Riley MD   omeprazole (PriLOSEC) 40 mg DR capsule Take 1 capsule (40 mg) by mouth once daily in the morning. Take before meals. Do not crush or chew. 2/24/25 2/24/26  PATRICK Weir   oxyBUTYnin XL (Ditropan-XL) 15 mg 24 hr tablet Take 1 tablet (15 mg) by mouth 2 times a day. 5/8/25   Adam Vargas MD   polyethylene glycol (Miralax) 17 gram/dose powder Mix 17 g of powder and drink 2 times a day before meals.  Patient taking differently: Mix 17 g of powder and drink once daily. 2/24/25 2/24/26  PATRICK Weir   baclofen (Lioresal) 20 mg tablet Take 1 tablet (20 mg) by mouth 2 times a day as needed for muscle spasms. 5/21/25 6/5/25  Troy Chiang DO        Family History  Family History[3]    Allergies:  RX Allergies[4]     Review of System:  12-system ROS negative except as documented in HPI    Objective    Objective   Vital Signs:  Visit Vitals  /71   Pulse 76   Temp 36.4 °C (97.5 °F) (Temporal)   Resp 19   Ht 1.753 m (5' 9\")   Wt 103 kg (226 lb)   SpO2 96%   BMI 33.37 kg/m²   Smoking Status Never   BSA 2.24 m²        Physical Examination:  General: A&Ox4, alert, coopertive, not in acute distress, resting comfortably in bed upon examination   HEENT: Normocephalic, atraumatic, EOMI, moist mucous membranes  Neck: Neck supple, trachea midline, no evidence of trauma  Cardiovascular: RRR, S1 & S2 appreciated, no murmurs, rubs, or gallops appreciated, distal pulses 2+ bilaterally (radial and dorsalis pedis)  Respiratory: CTAB, no respiratory distress, no wheezing, rales or rhonchi, on RA  Abdomen: Soft, nondistended, nontender to palpation, +bowel sounds, no guarding rigidity or rebound " tenderness  MSK: No joint swelling, BLE spasticity observed during interview and exam  Neuro: No focal deficits, normal sensation in BUE, follows all commands.  Skin: Warm and dry, no lesions, contusions, or erythema.  Psychiatric: Judgment intact.  Appropriate mood, affect and behavior.    Laboratory Data:  Results for orders placed or performed during the hospital encounter of 06/06/25 (from the past 24 hours)   CBC and Auto Differential   Result Value Ref Range    WBC 10.1 4.4 - 11.3 x10*3/uL    nRBC 0.0 0.0 - 0.0 /100 WBCs    RBC 5.12 4.50 - 5.90 x10*6/uL    Hemoglobin 15.5 13.5 - 17.5 g/dL    Hematocrit 45.4 41.0 - 52.0 %    MCV 89 80 - 100 fL    MCH 30.3 26.0 - 34.0 pg    MCHC 34.1 32.0 - 36.0 g/dL    RDW 12.7 11.5 - 14.5 %    Platelets 332 150 - 450 x10*3/uL    Neutrophils % 62.8 40.0 - 80.0 %    Immature Granulocytes %, Automated 0.5 0.0 - 0.9 %    Lymphocytes % 27.0 13.0 - 44.0 %    Monocytes % 7.5 2.0 - 10.0 %    Eosinophils % 1.5 0.0 - 6.0 %    Basophils % 0.7 0.0 - 2.0 %    Neutrophils Absolute 6.36 1.20 - 7.70 x10*3/uL    Immature Granulocytes Absolute, Automated 0.05 0.00 - 0.70 x10*3/uL    Lymphocytes Absolute 2.73 1.20 - 4.80 x10*3/uL    Monocytes Absolute 0.76 0.10 - 1.00 x10*3/uL    Eosinophils Absolute 0.15 0.00 - 0.70 x10*3/uL    Basophils Absolute 0.07 0.00 - 0.10 x10*3/uL   Comprehensive metabolic panel   Result Value Ref Range    Glucose 125 (H) 74 - 99 mg/dL    Sodium 140 136 - 145 mmol/L    Potassium 3.4 (L) 3.5 - 5.3 mmol/L    Chloride 105 98 - 107 mmol/L    Bicarbonate 25 21 - 32 mmol/L    Anion Gap 13 10 - 20 mmol/L    Urea Nitrogen 12 6 - 23 mg/dL    Creatinine 0.54 0.50 - 1.30 mg/dL    eGFR >90 >60 mL/min/1.73m*2    Calcium 9.0 8.6 - 10.3 mg/dL    Albumin 4.3 3.4 - 5.0 g/dL    Alkaline Phosphatase 79 33 - 120 U/L    Total Protein 7.1 6.4 - 8.2 g/dL    AST 32 9 - 39 U/L    Bilirubin, Total 0.7 0.0 - 1.2 mg/dL    ALT 35 10 - 52 U/L   Urinalysis with Reflex Microscopic   Result Value Ref  Range    Color, Urine Yellow Light-Yellow, Yellow, Dark-Yellow    Appearance, Urine Turbid (N) Clear    Specific Gravity, Urine 1.025 1.005 - 1.035    pH, Urine 6.0 5.0, 5.5, 6.0, 6.5, 7.0, 7.5, 8.0    Protein, Urine 20 (TRACE) NEGATIVE, 10 (TRACE), 20 (TRACE) mg/dL    Glucose, Urine Normal Normal mg/dL    Blood, Urine 0.1 (1+) (A) NEGATIVE mg/dL    Ketones, Urine NEGATIVE NEGATIVE mg/dL    Bilirubin, Urine NEGATIVE NEGATIVE mg/dL    Urobilinogen, Urine 2 (1+) (A) Normal mg/dL    Nitrite, Urine 2+ (A) NEGATIVE    Leukocyte Esterase, Urine 500 [place]/uL (A) NEGATIVE   Sedimentation rate, automated   Result Value Ref Range    Sedimentation Rate 25 (H) 0 - 15 mm/h   C-reactive protein   Result Value Ref Range    C-Reactive Protein 4.50 (H) <1.00 mg/dL   Microscopic Only, Urine   Result Value Ref Range    WBC, Urine >50 (A) 1-5, NONE /HPF    WBC Clumps, Urine OCCASIONAL Reference range not established. /HPF    RBC, Urine 11-20 (A) NONE, 1-2, 3-5 /HPF    Squamous Epithelial Cells, Urine 1-9 (SPARSE) Reference range not established. /HPF    Bacteria, Urine 3+ (A) NONE SEEN /HPF    Mucus, Urine 2+ Reference range not established. /LPF    Hyaline Casts, Urine OCCASIONAL (A) NONE /LPF   Lactate   Result Value Ref Range    Lactate 1.9 0.4 - 2.0 mmol/L     *Note: Due to a large number of results and/or encounters for the requested time period, some results have not been displayed. A complete set of results can be found in Results Review.       Imaging:  Imaging  CT cervical spine w IV contrast  Result Date: 6/7/2025  1.  No evidence of acute bony trauma to the cervical spine. 2. 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   MACRO: None   Signed by: Samuel Tenorio 6/7/2025 12:20 AM Dictation workstation:   [id number]    CT chest abdomen pelvis w IV contrast  Result Date: 6/7/2025  Evaluation is  slightly limited by photon starvation secondary to large patient body habitus and artifact from spinal fusion hardware and baclofen pump.   No definite evidence of acute pathology in the chest. Platelike atelectasis in the right lower lobe. Mild cardiomegaly.   Urinary bladder is incompletely distended and suboptimally evaluated. Suspected urothelial hyperemia and perivesical fat stranding, suspicious for acute cystitis.   Otherwise, no definite evidence of acute pathology in the abdomen or pelvis.   No evidence of acute thoracic or lumbar spine abnormality.   MACRO: None   Signed by: Hussein Kaur 6/7/2025 12:10 AM Dictation workstation:   [id number]    CT thoracic spine retrospective reconstruction protocol  Result Date: 6/7/2025  Evaluation is slightly limited by photon starvation secondary to large patient body habitus and artifact from spinal fusion hardware and baclofen pump.   No definite evidence of acute pathology in the chest. Platelike atelectasis in the right lower lobe. Mild cardiomegaly.   Urinary bladder is incompletely distended and suboptimally evaluated. Suspected urothelial hyperemia and perivesical fat stranding, suspicious for acute cystitis.   Otherwise, no definite evidence of acute pathology in the abdomen or pelvis.   No evidence of acute thoracic or lumbar spine abnormality.   MACRO: None   Signed by: Hussein Kaur 6/7/2025 12:10 AM Dictation workstation:   [id number]    CT lumbar spine retrospective reconstruction protocol  Result Date: 6/7/2025  Evaluation is slightly limited by photon starvation secondary to large patient body habitus and artifact from spinal fusion hardware and baclofen pump.   No definite evidence of acute pathology in the chest. Platelike atelectasis in the right lower lobe. Mild cardiomegaly.   Urinary bladder is incompletely distended and suboptimally evaluated. Suspected urothelial hyperemia and perivesical fat stranding, suspicious for acute cystitis.   Otherwise, no  definite evidence of acute pathology in the abdomen or pelvis.   No evidence of acute thoracic or lumbar spine abnormality.   MACRO: None   Signed by: Hussein Kaur 6/7/2025 12:10 AM Dictation workstation:   [id number]      Cardiology, Vascular, and Other Imaging  No other imaging results found for the past 2 days       Medications:  Scheduled medications  Scheduled Medications[5]  Continuous medications  Continuous Medications[6]  PRN medications  PRN Medications[7]  Assessment    Assessment & Plan   Mr. Jose Fernandez is a 25 y.o. male with PMH significant for Klebesiella UTIs, C5-C7 incomplete quadriplegia d/t intrauterine hemorrhage with resultant spasticity s/p baclofen pump placement implanted on 4/16/19, neurogenic bowel and bladder s/p botox (06/06/24) who presented to [facility] from home with elevated CRP and ESR on outpatient labs.  HDS at admission.  Labs and imagings grossly unremarkable.  ESR and CRP mildly elevated at 25 and 4.5 respectively.  UA was positive for 500 LE's, pyuria, and bacteriuria.  He was given a dose of Zosyn and admitted to [facility] without telemetry for further management of cystitis.  Assessment & Plan  Cystitis    #Cystitis  #Hx of recurrent Klebsiella UTI   #Neurogenic bladder  -Patient has remained afebrile throughout ED course and since admission and is hemodynamically stable  -He has a few urine cultures on 06/06/24, 10/09/24, 12/12/24, 02/13/25, 04/30/25 that were positive for Klebsiella, all but the one on 10/9/2024 were susceptible to Zosyn.  -He also had 1 urine culture on 7/10/2024 that grew Pseudomonas that was pansensitive  -Continue Zosyn while awaiting urine culture  -Monitor a.m. CBCs for leukocytosis  -Anticipate discharge home with oral antibiotics, likely Augmentin     CHRONIC PROBLEMS:  #C5-C7 incomplete quadriplegia d/t intrauterine hemorrhage with resultant spasticity s/p baclofen pump placement implanted on 4/16/19  #neurogenic bowel and bladder s/p botox (06/06/24)  -  Continue home medications as appropriate once med rec is ready to be completed    Global Plan of Care  IVF: as needed  Diet: Regular  DVT prophylaxis: Lovenox  Bowel regime: MiraLAX  Consults: None  Code Status: Full Code     Dispo: Anticipate 1-2 midnight stays    Patient seen and discussed with senior resident. To be staffed with attending.    Perla Richey DO  Internal Medicine, PGY 1  June 7, 2025      Patient seen and discussed with resident. I agree with the above documentation including history and physical examination. The above note as documented includes my personal edits and decision making, if needed, as identified in italics.      Plan preliminary until cosigned by attending physician.     Ryna Vargas D.O.   Family Medicine PGY-2, [facility]  06/07/25    Daytime resident addendum  -Patient remained vitally and hemodynamically stable overnight with no gross laboratory abnormalities.  Patient denies all symptomatology pertaining to 12 point ROS since admission to [facility].  Through shared decision making it was decided to have patient remain inpatient while urine cultures are pending to appropriately taper antimicrobial therapy on discharge as he has had multiple different organisms growing in urine cultures on previous encounters.  He has also been on multiple prophylactic medications to prevent urinary tract infections throughout his lifetime including approximately 18 years duration on Keflex.  He also has allergies to fluoroquinolones and Bactrim.  For further tapering of antimicrobial therapy, and recommendations on prophylactic medication moving forward on discharge after this urinary tract infection has been treated we will consult infectious diseases team and appreciate their recommendations.  Patient will remain on Zosyn until urine cultures have returned.       [1]   Past Surgical History:  Procedure Laterality Date    ACHILLES TENDON SURGERY  10/30/2013    Subcutaneous Tenotomy Achilles Tendon  Under Gen Anesthesia    BACLOFEN PUMP IMPLANTATION  2019    BRONCHOSCOPY      CYSTOSCOPY  12/12/2024    GASTROSTOMY  10/30/2013    Gastric Surgery Feeding Gastrostomy s/p removal    MITROFANOFF PROCEDURE  08/06/2018    NISSEN FUNDOPLICATION      2003    OTHER SURGICAL HISTORY  10/30/2013    Direct Laryngoscopy    OTHER SURGICAL HISTORY  10/30/2013    Closed Treatment Fx Of Proximal Femoral Neck W/ Manipulation    OTHER SURGICAL HISTORY  04/16/2015    Complex Repair Of Wound Trunk 2.6 To 7.5 Cm    OTHER SURGICAL HISTORY  10/14/2022    Circumcision    SPINAL FUSION      x2 2015, 2017    TRACHEOSTOMY TUBE PLACEMENT      s/p decannulation 2005    US GUIDED PERCUTANEOUS PERITONEAL OR RETROPERITONEAL FLUID COLLECTION DRAINAGE  04/08/2021    US GUIDED PERCUTANEOUS PERITONEAL OR RETROPERITONEAL FLUID COLLECTION DRAINAGE 4/8/2021 [facility]   [2]   Social History  Tobacco Use    Smoking status: Never     Passive exposure: Never    Smokeless tobacco: Never   Vaping Use    Vaping status: Never Used   Substance Use Topics    Alcohol use: Yes     Alcohol/week: 0.0 - 3.0 standard drinks of alcohol     Comment: patient states socially    Drug use: Never   [3]   Family History  Problem Relation Name Age of Onset    Diabetes Maternal Grandfather     [4]   Allergies  Allergen Reactions    Ciprofloxacin Anaphylaxis     Has taken as an adult, anaphylaxis as an infant    Sulfamethoxazole-Trimethoprim Anaphylaxis    Sutures Other     Patients mother states patient reacts to dissolveable suture     Fish Containing Products Hives    Shellfish Containing Products Hives    Vancomycin Other     Red Man's syndrome    [5] enoxaparin, 40 mg, subcutaneous, q24h  piperacillin-tazobactam, 3.375 g, intravenous, q6h  [6] baclofen, 300 mcg/day, Last Rate: 300 mcg/day (11/21/24 0942)  baclofen, 100 mcg/day, Last Rate: 100 mcg/day (01/11/24 1101)  [7]    1101)  [7]

## 2025-06-07 NOTE — CARE PLAN
EOS Note Pt has remained HDS throughout shift. He remains AO x 4, no telemetry, WC bound. Pt has WC in room and family will push him around floor. Pt straight caths himself via belly button with his supplies. Pt's girlfriend is at bedside. Pt is resting comfortably in bed, alarms on and call light within reach.      The patient's goals for the shift include      The clinical goals for the shift include See plan of care

## 2025-06-07 NOTE — HOSPITAL COURSE
Mr. Jose Fernandez is a 25 y.o. male with PMH significant for Klebesiella UTIs, C5-C7 incomplete quadriplegia d/t intrauterine hemorrhage with resultant spasticity s/p baclofen pump placement implanted on 4/16/19, neurogenic bowel and bladder s/p botox (06/06/24) who presented to [facility] from home with elevated CRP and ESR on outpatient labs. Patient seen and examined with mother and girlfriend at bedside in the emergency department and is a good historian.  Patient was found to have elevated CRP of 67.9 and ESR of 29 on outpatient labs ordered by his pediatric orthopedic surgeon Dr. Riley  while being worked up for worsening spasticity in his legs.  Patient was found to have urinalysis concerning for UTI, and he was subsequently admitted for IV antimicrobial therapy in the setting of varying resistances on microbial growth of previous urine cultures.  Patient has been treated with IV Zosyn and seen by infectious diseases regarding antimicrobial therapy as well as prophylactic therapy on home-going.  Blood cultures and urine cultures are still pending.  He has remained vitally and hemodynamically stable with exception to mild hypertension for which amlodipine 5 mg was initiated and will be continued on home-going.

## 2025-06-07 NOTE — CONSULTS
Consults  Reason for Consult:  Evaluation and management of UTI    Patient is seen at the request of Dr. Maria Del Carmen Parr    Subjective   History of Present Illness:  Jose Fernandez is a 25 y.o. male who presents with dark and foul-smelling urine.  He has a history of recurrent UTIs with the most recent one in April 2025.  He apparently has become septic in the past so there is heightened concern when he starts developing potential signs.  He has been getting yearly Botox injections.  He also is maintained on oxybutynin and baclofen.  He started having bladder spasms this week.  On arrival his temperature was 36.4 with a white blood count of 10.1 and a creatinine of 0.5.  His urinalysis shows pyuria.  Urine cultures and blood cultures were collected.  He has been placed on empiric Zosyn.  He is eating lunch right now.  He denies nausea vomiting or diarrhea.  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), Asthma, GERD (gastroesophageal reflux disease), Hemiplegia (Multi), History of transfusion, Immunocompromised, Intra-abdominal abscess (Multi), Mitrofanoff appendicovesicostomy present (Multi), MSSA (methicillin susceptible Staphylococcus aureus), Myelomalacia (Multi), Neurogenic bladder, Neurogenic bowel, Other conditions influencing health status (07/21/2013), Other conditions influencing health status (03/04/2022), Pectus excavatum (07/21/2013), Peritoneal abscess (Multi) (05/24/2021), Peritonitis, Personal history of diseases of the skin and subcutaneous tissue (05/17/2019), Personal history of diseases of the skin and subcutaneous tissue, Personal history of other diseases of the digestive system (04/13/2020), Personal history of other infectious and parasitic diseases (07/24/2019), Personal history of other infectious and parasitic  diseases (09/24/2018), Reactive airway disease ([facility]), Recurrent UTI (urinary tract infection), Respiratory insufficiency, Restrictive lung disease, Scoliosis, unspecified, Snoring, Tinea pedis (07/23/2019), Urinary tract infection, UTI (urinary tract infection), and Xerosis cutis (07/23/2019).    has a past surgical history that includes Gastrostomy (10/30/2013); Achilles tendon surgery (10/30/2013); Other surgical history (10/30/2013); Other surgical history (10/30/2013); Other surgical history (04/16/2015); Other surgical history (10/14/2022); US guided percutaneous peritoneal or retroperitoneal fluid collection drainage (04/08/2021); Bronchoscopy; Tracheostomy tube placement; Spinal fusion; Baclofen pump implantation (2019); Nissen fundoplication; Cystoscopy (12/12/2024); and Mitrofanoff procedure (08/06/2018).     Social History:   reports that he has never smoked. He has never been exposed to tobacco smoke. He has never used smokeless tobacco. He reports current alcohol use. He reports that he does not use drugs.     Family History:  No sick contacts    Review of Systems:  Bladder spasms    Allergies:  Ciprofloxacin, Sulfamethoxazole-trimethoprim, Sutures, Fish containing products, Shellfish containing products, and Vancomycin      Objective   Current Medications[1]    Physical Exam:  /89 (BP Location: Left arm, Patient Position: Lying)   Pulse 68   Temp 36.8 °C (98.2 °F) (Temporal)   Resp 14   Wt 106 kg (233 lb 7.5 oz)   SpO2 98%    General: no acute distress, sitting up in bed, eating lunch  HEENT: pink pharynx  CVS: RRR  Resp: decreased breath sounds in bases  ABD: soft, NT, ND  EXT: no edema  Skin: no rash     Relevant Results:    Labs:  Results from last 72 hours   Lab Units 06/07/25  0544 06/06/25  2213   SODIUM mmol/L 139 140   POTASSIUM mmol/L 3.6 3.4*   CHLORIDE mmol/L 103 105   BUN mg/dL 14 12   CREATININE mg/dL 0.57 0.54   MAGNESIUM mg/dL 2.07  --    PHOSPHORUS mg/dL 4.0  --       Results from last 72 hours   Lab Units 06/07/25  0544 06/06/25  2213   WBC AUTO x10*3/uL 10.9 10.1   HEMOGLOBIN g/dL 14.4 15.5   HEMATOCRIT % 43.3 45.4   PLATELETS AUTO x10*3/uL 309 332     6/5/2025 urinalysis shows 2+ nitrite; 500 leuk esterase    Microbiology data: I have personally and independently reviewed and intrepreted the lab results  6/6/2025 urine cultures pending  6/6/2025 blood cultures are pending    Imaging data: I have personally and independently reviewed and interpreted the imaging studies  6/6/2025 CT chest/abdomen/pelvis/spine shows no acute issues     Assessment/Plan     MY IMPRESSION & RECOMMENDATIONS:  Pyuria with concern for recurrent UTI.  I would recommend that we continue with the Zosyn.  We will adjust the treatment based on further culture results.    - Continue Zosyn  - Await urine culture    Other issues:  #Quadriplegia with neurogenic bowel and bladder status post Botox complicated by spasticity status post baclofen pump. He does intermittent straight caths  #History of recurrent UTIs    ~I have personally and independently reviewed and interpreted the laboratory tests, imaging studies, and the documentation from other healthcare providers.  I am monitoring for side effects from Zosyn, which can include rash, diarrhea, and bone marrow suppression.  His prognosis is uncertain.         Justin Ware MD          [1]   Current Facility-Administered Medications   Medication Dose Route Frequency Provider Last Rate Last Admin    albuterol 2.5 mg /3 mL (0.083 %) nebulizer solution 2.5 mg  2.5 mg nebulization q4h PRN Brian Roman MD        baclofen (Lioresal) tablet 40 mg  40 mg oral TID Brian Roman MD        budesonide (Pulmicort) 0.5 mg/2 mL nebulizer solution 1 mg  1 mg nebulization BID Brian Roman MD        cholecalciferol (Vitamin D-3) tablet 50 mcg  50 mcg oral Daily Brian Roman MD   50 mcg at 06/07/25 1203    docusate sodium (Colace) capsule 200 mg  200 mg oral Daily  Brian Roman MD   200 mg at 06/07/25 1203    enoxaparin (Lovenox) syringe 40 mg  40 mg subcutaneous q24h Perla Richey DO   40 mg at 06/07/25 0325    ferrous sulfate 325 mg (65 mg elemental) tablet 1 tablet  65 mg of elemental iron oral Daily Brian Roman MD   1 tablet at 06/07/25 1203    hydroCHLOROthiazide (HYDRODiuril) tablet 25 mg  25 mg oral Daily Brian Roman MD   25 mg at 06/07/25 1203    montelukast (Singulair) tablet 10 mg  10 mg oral Daily Brian Roman MD   10 mg at 06/07/25 1203    oxyBUTYnin (Ditropan) tablet 5 mg  5 mg oral TID Brian Roman MD        [START ON 6/8/2025] pantoprazole (ProtoNix) EC tablet 40 mg  40 mg oral Daily before breakfast Brian Roman MD        piperacillin-tazobactam (Zosyn) 3.375 g in dextrose (iso) IV 50 mL  3.375 g intravenous q8h Perla Richey DO 0 mL/hr at 06/07/25 0804 3.375 g at 06/07/25 1136    polyethylene glycol (Glycolax, Miralax) packet 17 g  17 g oral Daily Brian Roman MD   17 g at 06/07/25 1203

## 2025-06-07 NOTE — NURSING NOTE
0700: Assumed care of patient     0800: Pt is lying in bed with HOB elevated. Facial symmetry is even and speech is clear. Pt denies any pain or discomfort at this time. Respirations are even and un labored, LSCTA. Bowel sounds present and active x4. Skin is warm, dry and intact. Color is WNL for ethnicity. Call light and fluids within reach.     Pt remained HDS this shift, denies any pain or discomfort. Medications taken as ordered. Safety maintained, call light and fluids within reach.

## 2025-06-07 NOTE — ED PROVIDER NOTES
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 Riley due to reported worsening muscle spasms in his legs.  Family states patient has had foul-smelling urine over the past couple of days and state when patient has had urosepsis in the past requiring ICU admission and prior intra-abdominal abscesses worsening muscle spasms in the legs per patient's for symptom.  Family states patient has felt hot and feverish but has not had a recorded temperature over 100 Fahrenheit.  Patient does not have any sensation below the sternum so he does not feel pain when he has urinary or intra-abdominal infections.    Nursing Notes were reviewed.    PAST MEDICAL HISTORY   Medical History[1]      SURGICAL HISTORY     Surgical History[2]      CURRENT MEDICATIONS       Previous Medications    ALBUTEROL 2.5 MG /3 ML (0.083 %) NEBULIZER SOLUTION    Take 3 mL (2.5 mg) by nebulization every 4 hours if needed for wheezing. Inhale one vial every 4-6 hours as needed for cough, wheezing and shortness of breath    BACLOFEN (LIORESAL) 20 MG TABLET    Take 2 tablets (40 mg) by mouth 3 times a day.    CHOLECALCIFEROL (VITAMIN D-3) 50 MCG (2,000 UNIT) CAPSULE    TAKE 1 CAPSULE  Daily    DIAZEPAM (VALIUM) 5 MG TABLET    Take 1 tablet (5 mg) by mouth every 8 hours if needed for anxiety or muscle spasms for up to 5 days.    DOCUSATE SODIUM 250 MG CAPSULE    Take by mouth once daily.    FERROUS SULFATE ER (SLOW FE) 142 MG ER TABLET    Take 1 tablet by mouth once daily.    HYDROCHLOROTHIAZIDE (HYDRODIURIL) 25 MG TABLET    Take 1 tablet (25 mg) by mouth once daily.    L. ACIDOPHILUS/BIFID. ANIMALIS ORAL    Take 1 capsule by mouth once daily.    MIRABEGRON (MYRBETRIQ) 50 MG TABLET EXTENDED RELEASE 24 HR 24 HR TABLET    Take 1 tablet (50 mg) by mouth once daily.    MOMETASONE (ASMANEX TWISTHALER) 220 MCG/ ACTUATION (14) INHALER    Inhale 2 puffs once daily as needed. Rinse mouth with water after use to reduce aftertaste and incidence of candidiasis. Do not swallow.    MONTELUKAST (SINGULAIR) 10 MG TABLET    Take 1 tablet (10 mg) by mouth once daily.    MUPIROCIN (BACTROBAN) 2 % OINTMENT    APPLY AS DIRECTED TWICE DAILY TO THE INSIDES OF BOTH NOSTRILS WITH A COTTON SWAB FOR 5 DAYS PRIOR TO SURGERY    OMEPRAZOLE (PRILOSEC) 40 MG DR CAPSULE    Take 1 capsule (40 mg) by mouth once daily in the morning. Take before meals. Do not crush or chew.    OXYBUTYNIN XL (DITROPAN-XL) 15 MG 24 HR TABLET    Take 1 tablet (15 mg) by mouth 2 times a day.    POLYETHYLENE GLYCOL (MIRALAX) 17 GRAM/DOSE POWDER    Mix 17 g of powder and drink 2 times a day before meals.       ALLERGIES     Ciprofloxacin, Sulfamethoxazole-trimethoprim, Sutures, Fish containing products, Shellfish containing products, and Vancomycin    FAMILY HISTORY     Family History[3]       SOCIAL HISTORY     Social History[4]    SCREENINGS                        PHYSICAL EXAM    (up to 7 for level 4, 8 or more for level 5)     ED Triage Vitals [06/06/25 2109]   Temperature Heart Rate Respirations BP   36.4 °C (97.5 °F) 79 18 (!) 164/95      Pulse Ox Temp Source Heart Rate Source Patient Position   96 % Temporal Monitor Sitting      BP Location FiO2  (%)     Right arm --       Physical Exam  Vitals and nursing note reviewed.   Constitutional:       General: He is not in acute distress.     Appearance: He is obese. He is not ill-appearing, toxic-appearing or diaphoretic.   HENT:      Head: Normocephalic and atraumatic.      Right Ear: External ear normal.      Left Ear: External ear normal.      Nose: Nose normal.      Mouth/Throat:      Pharynx: Oropharynx is clear.   Eyes:      Conjunctiva/sclera: Conjunctivae normal.   Cardiovascular:      Rate and Rhythm: Normal rate and regular rhythm.      Pulses: Normal pulses.      Heart sounds: Normal heart sounds.   Pulmonary:      Effort: Pulmonary effort is normal.      Breath sounds: Normal breath sounds.   Abdominal:      Comments: Abdomen soft nontender.  Palpable baclofen pump in the right abdomen without surrounding fluctuance or overlying erythema.  Patient also has a appendicovesicostomy without purulent discharge or surrounding erythema.   Musculoskeletal:         General: Normal range of motion.      Cervical back: Normal range of motion and neck supple. No rigidity or tenderness.      Right lower leg: No edema.      Left lower leg: No edema.   Skin:     General: Skin is warm and dry.   Neurological:      Mental Status: He is alert and oriented to person, place, and time. Mental status is at baseline.   Psychiatric:         Mood and Affect: Mood normal.         Behavior: Behavior normal.          DIAGNOSTIC RESULTS     LABS:  Labs Reviewed   COMPREHENSIVE METABOLIC PANEL - Abnormal       Result Value    Glucose 125 (*)     Sodium 140      Potassium 3.4 (*)     Chloride 105      Bicarbonate 25      Anion Gap 13      Urea Nitrogen 12      Creatinine 0.54      eGFR >90      Calcium 9.0      Albumin 4.3      Alkaline Phosphatase 79      Total Protein 7.1      AST 32      Bilirubin, Total 0.7      ALT 35     URINALYSIS WITH REFLEX MICROSCOPIC - Abnormal    Color, Urine Yellow      Appearance, Urine Turbid (*)      Specific Gravity, Urine 1.025      pH, Urine 6.0      Protein, Urine 20 (TRACE)      Glucose, Urine Normal      Blood, Urine 0.1 (1+) (*)     Ketones, Urine NEGATIVE      Bilirubin, Urine NEGATIVE      Urobilinogen, Urine 2 (1+) (*)     Nitrite, Urine 2+ (*)     Leukocyte Esterase, Urine 500 [place]/uL (*)    SEDIMENTATION RATE, AUTOMATED - Abnormal    Sedimentation Rate 25 (*)    C-REACTIVE PROTEIN - Abnormal    C-Reactive Protein 4.50 (*)    MICROSCOPIC ONLY, URINE - Abnormal    WBC, Urine >50 (*)     WBC Clumps, Urine OCCASIONAL      RBC, Urine 11-20 (*)     Squamous Epithelial Cells, Urine 1-9 (SPARSE)      Bacteria, Urine 3+ (*)     Mucus, Urine 2+      Hyaline Casts, Urine OCCASIONAL (*)    BLOOD CULTURE   BLOOD CULTURE   CBC WITH AUTO DIFFERENTIAL    WBC 10.1      nRBC 0.0      RBC 5.12      Hemoglobin 15.5      Hematocrit 45.4      MCV 89      MCH 30.3      MCHC 34.1      RDW 12.7      Platelets 332      Neutrophils % 62.8      Immature Granulocytes %, Automated 0.5      Lymphocytes % 27.0      Monocytes % 7.5      Eosinophils % 1.5      Basophils % 0.7      Neutrophils Absolute 6.36      Immature Granulocytes Absolute, Automated 0.05      Lymphocytes Absolute 2.73      Monocytes Absolute 0.76      Eosinophils Absolute 0.15      Basophils Absolute 0.07     LACTATE       All other labs were within normal range or not returned as of this dictation.    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  Patient is hemodynamically stable, no acute distress, nontoxic-appearing, afebrile.  Given patient's extensive medical history including UTIs, intra-abdominal infections and patient not able to feel anything from the sternum down we will order infectious workup including repeat CRP, ESR as well as CBC, CMP, urinalysis and CT chest abdomen pelvis with IV contrast and CT of the C/T/L-spine with IV contrast.  Urinalysis does have positive nitrates, LE and 3+ bacteria suggesting l this is likely patient's source of infection.  Patient's last 3 urine culture show sensitivity to Zosyn so a dose of IV Unasyn was ordered pending completion of patient's workup.    Patient signed out to night resident pending completion of workup.    Patient and or family in agreement and understanding of treatment plan.  All questions answered.      I reviewed the case with the attending ED physician. The attending ED physician agrees with the plan. Patient and/or patient´s representative was counseled regarding labs, imaging, likely diagnosis, and plan. All questions were answered.    ED Medications administered this visit:    Medications   piperacillin-tazobactam (Zosyn) 3.375 g in dextrose (iso) IV 50 mL (3.375 g intravenous New Bag 6/6/25 2239)   iohexol (OMNIPaque) 350 mg iodine/mL solution 75 mL (75 mL intravenous Given 6/6/25 2304)       New Prescriptions from this visit:    New Prescriptions    No medications on file       Follow-up:  No follow-up provider specified.      Final Impression: No diagnosis found.      (Please note that portions of this note were completed with a voice recognition program.  Efforts were made to edit the dictations but occasionally words are mis-transcribed.)         [1]   Past Medical History:  Diagnosis Date    Abnormal results of pulmonary  function studies 03/21/2019    Abnormal PFT    Acute infarction of spinal cord (embolic) (nonembolic) (Multi) 04/30/2020    intrauterine hemorrhage and subsequent cord infarction    Asthma     last seen by Lyric Cameron 05/11/2023    GERD (gastroesophageal reflux disease)     s/p Nissen    Hemiplegia (Multi)     History of transfusion     Immunocompromised     Intra-abdominal abscess (Multi)     recurrent    Mitrofanoff appendicovesicostomy present (Multi)     MSSA (methicillin susceptible Staphylococcus aureus)     MSSA R gluteal abscess 03/2023    Myelomalacia (Multi)     Neurogenic bladder     Neurogenic bowel     Other conditions influencing health status 07/21/2013    Drug-induced Myelopathy    Other conditions influencing health status 03/04/2022    Abscess, abdomen    Pectus excavatum 07/21/2013    Pectus excavatum    Peritoneal abscess (Multi) 05/24/2021    Peritonitis with abscess of intestine    Peritonitis     Personal history of diseases of the skin and subcutaneous tissue 05/17/2019    History of dermatitis    Personal history of diseases of the skin and subcutaneous tissue     History of eczema    Personal history of other diseases of the digestive system 04/13/2020    History of chronic constipation    Personal history of other infectious and parasitic diseases 07/24/2019    History of onychomycosis    Personal history of other infectious and parasitic diseases 09/24/2018    History of wound infection    Reactive airway disease (HHS-HCC)     Recurrent UTI (urinary tract infection)     s/p Mitrofanoff    Respiratory insufficiency     BiPap supplementation at night-has not used in 10 years    Restrictive lung disease     Scoliosis, unspecified     Kyphoscoliosis    Snoring     Snoring    Tinea pedis 07/23/2019    Tinea pedis of both feet    Urinary tract infection     Recurrent UTI's S/p mitrofanoff with Dr. Myles.    UTI (urinary tract infection)     current    Xerosis cutis 07/23/2019    Xerosis of skin    [2]   Past Surgical History:  Procedure Laterality Date    ACHILLES TENDON SURGERY  10/30/2013    Subcutaneous Tenotomy Achilles Tendon Under Gen Anesthesia    BACLOFEN PUMP IMPLANTATION  2019    BRONCHOSCOPY      CYSTOSCOPY  12/12/2024    GASTROSTOMY  10/30/2013    Gastric Surgery Feeding Gastrostomy s/p removal    MITROFANOFF PROCEDURE  08/06/2018    NISSEN FUNDOPLICATION      2003    OTHER SURGICAL HISTORY  10/30/2013    Direct Laryngoscopy    OTHER SURGICAL HISTORY  10/30/2013    Closed Treatment Fx Of Proximal Femoral Neck W/ Manipulation    OTHER SURGICAL HISTORY  04/16/2015    Complex Repair Of Wound Trunk 2.6 To 7.5 Cm    OTHER SURGICAL HISTORY  10/14/2022    Circumcision    SPINAL FUSION      x2 2015, 2017    TRACHEOSTOMY TUBE PLACEMENT      s/p decannulation 2005    US GUIDED PERCUTANEOUS PERITONEAL OR RETROPERITONEAL FLUID COLLECTION DRAINAGE  04/08/2021    US GUIDED PERCUTANEOUS PERITONEAL OR RETROPERITONEAL FLUID COLLECTION DRAINAGE 4/8/2021 [facility]   [3]   Family History  Problem Relation Name Age of Onset    Diabetes Maternal Grandfather     [4]   Social History  Socioeconomic History    Marital status: Single   Tobacco Use    Smoking status: Never     Passive exposure: Never    Smokeless tobacco: Never   Vaping Use    Vaping status: Never Used   Substance and Sexual Activity    Alcohol use: Yes     Alcohol/week: 0.0 - 3.0 standard drinks of alcohol     Comment: patient states socially    Drug use: Never    Sexual activity: Defer        Connor Pedro,   Resident  06/06/25 1005     by Dr. Pedro. At the time of sign-out, the patient was awaiting admission pending CT results. The CT returned without evidence of acute pathology. Internal Medicine was consulted, and the case was discussed with the admitting team for sepsis. The patient was subsequently admitted to the service of Dr. Parr. 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  Patient is hemodynamically stable, no acute distress, nontoxic-appearing, afebrile.  Given patient's extensive medical history including UTIs, intra-abdominal infections and patient not able to feel anything from the sternum down we will order infectious workup including repeat CRP, ESR as well as CBC, CMP, urinalysis and CT chest abdomen pelvis with IV contrast and CT of the C/T/L-spine with IV contrast.  Urinalysis does have positive nitrates, LE and 3+ bacteria suggesting l this is likely patient's source of infection.  Patient's last 3 urine culture show sensitivity to Zosyn so a dose of IV Unasyn was ordered pending completion of patient's workup.    Patient signed out to night resident pending completion of workup.    Patient and or family in agreement and understanding of treatment plan.  All questions answered.      I reviewed the case with the attending ED physician. The attending ED physician agrees with the plan. Patient and/or patient´s representative was counseled regarding labs, imaging, likely diagnosis, and plan. All  questions were answered.    ED Medications administered this visit:    Medications   piperacillin-tazobactam (Zosyn) 3.375 g in dextrose (iso) IV 50 mL (0 g intravenous Stopped 6/6/25 2325)   iohexol (OMNIPaque) 350 mg iodine/mL solution 75 mL (75 mL intravenous Given 6/6/25 2304)   potassium chloride (Klor-Con) packet 40 mEq (40 mEq oral Given 6/7/25 0621)   potassium chloride CR (Klor-Con M20) ER tablet 20 mEq (20 mEq oral Given 6/9/25 0987)       New Prescriptions from this visit:    Discharge Medication List as of 6/9/2025  2:04 PM        START taking these medications    Details   amLODIPine (Norvasc) 5 mg tablet Take 1 tablet (5 mg) by mouth once daily., Starting Tue 6/10/2025, Normal      nitrofurantoin, macrocrystal-monohydrate, (Macrobid) 100 mg capsule Take 1 capsule (100 mg) by mouth 2 times a day., Starting Mon 6/9/2025, Until Wed 7/9/2025, Normal             Follow-up:  Primary care provider (PCP)      FOllow up with your PCP within 7-14 days for a hospital follow up.    Troy Chiang DO  [address]  [address]  [place] [id number] [phone]          Adam Vargas MD  [id number] [facility]  Department of Urology  [place] [id number] [phone]          Justin Ware MD  [id number] [facility] [address]              Final Impression:   1. Cystitis    2. Pyelonephritis    3. Quadriplegia and quadriparesis (Multi)    4. Gastroesophageal reflux disease with esophagitis, unspecified whether hemorrhage    5. Gastroesophageal reflux disease without esophagitis    6. Constipation, chronic    7. Primary hypertension          (Please note that portions of this note were completed with a voice recognition program.  Efforts were made to edit the dictations but occasionally words are mis-transcribed.)       Connor Pedro DO  Resident  06/06/25 1205         [1]   Past Medical History:  Diagnosis Date    Abnormal results of pulmonary function studies 03/21/2019    Abnormal PFT    Acute  infarction of spinal cord (embolic) (nonembolic) (Multi) 04/30/2020    intrauterine hemorrhage and subsequent cord infarction    Asthma     last seen by Lyric Cameron 05/11/2023    GERD (gastroesophageal reflux disease)     s/p Nissen    Hemiplegia (Multi)     History of transfusion     Immunocompromised     Intra-abdominal abscess (Multi)     recurrent    Mitrofanoff appendicovesicostomy present (Multi)     MSSA (methicillin susceptible Staphylococcus aureus)     MSSA R gluteal abscess 03/2023    Myelomalacia (Multi)     Neurogenic bladder     Neurogenic bowel     Other conditions influencing health status 07/21/2013    Drug-induced Myelopathy    Other conditions influencing health status 03/04/2022    Abscess, abdomen    Pectus excavatum 07/21/2013    Pectus excavatum    Peritoneal abscess (Multi) 05/24/2021    Peritonitis with abscess of intestine    Peritonitis     Personal history of diseases of the skin and subcutaneous tissue 05/17/2019    History of dermatitis    Personal history of diseases of the skin and subcutaneous tissue     History of eczema    Personal history of other diseases of the digestive system 04/13/2020    History of chronic constipation    Personal history of other infectious and parasitic diseases 07/24/2019    History of onychomycosis    Personal history of other infectious and parasitic diseases 09/24/2018    History of wound infection    Reactive airway disease (HHS-HCC)     Recurrent UTI (urinary tract infection)     s/p Mitrofanoff    Respiratory insufficiency     BiPap supplementation at night-has not used in 10 years    Restrictive lung disease     Scoliosis, unspecified     Kyphoscoliosis    Snoring     Snoring    Tinea pedis 07/23/2019    Tinea pedis of both feet    Urinary tract infection     Recurrent UTI's S/p mitrofanoff with Dr. Myles.    UTI (urinary tract infection)     current    Xerosis cutis 07/23/2019    Xerosis of skin   [2]   Past Surgical History:  Procedure Laterality  Date    ACHILLES TENDON SURGERY  10/30/2013    Subcutaneous Tenotomy Achilles Tendon Under Gen Anesthesia    BACLOFEN PUMP IMPLANTATION  2019    BRONCHOSCOPY      CYSTOSCOPY  12/12/2024    GASTROSTOMY  10/30/2013    Gastric Surgery Feeding Gastrostomy s/p removal    MITROFANOFF PROCEDURE  08/06/2018    NISSEN FUNDOPLICATION      2003    OTHER SURGICAL HISTORY  10/30/2013    Direct Laryngoscopy    OTHER SURGICAL HISTORY  10/30/2013    Closed Treatment Fx Of Proximal Femoral Neck W/ Manipulation    OTHER SURGICAL HISTORY  04/16/2015    Complex Repair Of Wound Trunk 2.6 To 7.5 Cm    OTHER SURGICAL HISTORY  10/14/2022    Circumcision    SPINAL FUSION      x2 2015, 2017    TRACHEOSTOMY TUBE PLACEMENT      s/p decannulation 2005    US GUIDED PERCUTANEOUS PERITONEAL OR RETROPERITONEAL FLUID COLLECTION DRAINAGE  04/08/2021    US GUIDED PERCUTANEOUS PERITONEAL OR RETROPERITONEAL FLUID COLLECTION DRAINAGE 4/8/2021 [facility]   [3]   Family History  Problem Relation Name Age of Onset    Diabetes Maternal Grandfather     [4]   Social History  Socioeconomic History    Marital status: Single   Tobacco Use    Smoking status: Never     Passive exposure: Never    Smokeless tobacco: Never   Vaping Use    Vaping status: Never Used   Substance and Sexual Activity    Alcohol use: Yes     Alcohol/week: 0.0 - 3.0 standard drinks of alcohol     Comment: patient states socially    Drug use: Never    Sexual activity: Defer     Social Drivers of Health     Financial Resource Strain: Low Risk  (6/7/2025)    Overall Financial Resource Strain (CARDIA)     Difficulty of Paying Living Expenses: Not hard at all   Food Insecurity: No Food Insecurity (6/7/2025)    Hunger Vital Sign     Worried About Running Out of Food in the Last Year: Never true     Ran Out of Food in the Last Year: Never true   Transportation Needs: No Transportation Needs (6/7/2025)    PRAPARE - Transportation     Lack of Transportation (Medical): No     Lack of  Transportation (Non-Medical): No   Intimate Partner Violence: Not At Risk (6/7/2025)    Humiliation, Afraid, Rape, and Kick questionnaire     Fear of Current or Ex-Partner: No     Emotionally Abused: No     Physically Abused: No     Sexually Abused: No   Housing Stability: Low Risk  (6/7/2025)    Housing Stability Vital Sign     Unable to Pay for Housing in the Last Year: No     Number of Times Moved in the Last Year: 1     Homeless in the Last Year: No        Cory Dhillon MD  06/12/25 6641

## 2025-06-07 NOTE — CARE PLAN
The patient's goals for the shift include      The clinical goals for the shift include see plan of care    Problem: Pain - Adult  Goal: Verbalizes/displays adequate comfort level or baseline comfort level  Outcome: Progressing     Problem: Safety - Adult  Goal: Free from fall injury  Outcome: Progressing     Problem: Discharge Planning  Goal: Discharge to home or other facility with appropriate resources  Outcome: Progressing     Problem: Chronic Conditions and Co-morbidities  Goal: Patient's chronic conditions and co-morbidity symptoms are monitored and maintained or improved  Outcome: Progressing     Problem: Nutrition  Goal: Nutrient intake appropriate for maintaining nutritional needs  Outcome: Progressing     Problem: Fall/Injury  Goal: Not fall by end of shift  Outcome: Progressing  Goal: Be free from injury by end of the shift  Outcome: Progressing  Goal: Verbalize understanding of personal risk factors for fall in the hospital  Outcome: Progressing  Goal: Verbalize understanding of risk factor reduction measures to prevent injury from fall in the home  Outcome: Progressing  Goal: Use assistive devices by end of the shift  Outcome: Progressing  Goal: Pace activities to prevent fatigue by end of the shift  Outcome: Progressing     Problem: Skin  Goal: Decreased wound size/increased tissue granulation at next dressing change  Outcome: Progressing  Flowsheets (Taken 6/7/2025 0316)  Decreased wound size/increased tissue granulation at next dressing change: Promote sleep for wound healing  Goal: Participates in plan/prevention/treatment measures  Outcome: Progressing  Flowsheets (Taken 6/7/2025 0316)  Participates in plan/prevention/treatment measures: Elevate heels  Goal: Prevent/manage excess moisture  Outcome: Progressing  Flowsheets (Taken 6/7/2025 0316)  Prevent/manage excess moisture: Monitor for/manage infection if present  Goal: Prevent/minimize sheer/friction injuries  Outcome: Progressing  Flowsheets  (Taken 6/7/2025 0316)  Prevent/minimize sheer/friction injuries: HOB 30 degrees or less  Goal: Promote/optimize nutrition  Outcome: Progressing  Flowsheets (Taken 6/7/2025 0316)  Promote/optimize nutrition: Monitor/record intake including meals  Goal: Promote skin healing  Outcome: Progressing  Flowsheets (Taken 6/7/2025 0316)  Promote skin healing: Assess skin/pad under line(s)/device(s)

## 2025-06-08 VITALS
DIASTOLIC BLOOD PRESSURE: 87 MMHG | HEIGHT: 69 IN | BODY MASS INDEX: 34.58 KG/M2 | TEMPERATURE: 98.1 F | WEIGHT: 233.47 LBS | HEART RATE: 101 BPM | SYSTOLIC BLOOD PRESSURE: 154 MMHG | RESPIRATION RATE: 18 BRPM | OXYGEN SATURATION: 97 %

## 2025-06-08 LAB
ALBUMIN SERPL BCP-MCNC: 4.3 G/DL (ref 3.4–5)
ANION GAP SERPL CALC-SCNC: 16 MMOL/L (ref 10–20)
BACTERIA BLD CULT: NORMAL
BACTERIA BLD CULT: NORMAL
BACTERIA UR CULT: ABNORMAL
BACTERIA UR CULT: NO GROWTH
BUN SERPL-MCNC: 9 MG/DL (ref 6–23)
CALCIUM SERPL-MCNC: 9.3 MG/DL (ref 8.6–10.3)
CHLORIDE SERPL-SCNC: 101 MMOL/L (ref 98–107)
CO2 SERPL-SCNC: 22 MMOL/L (ref 21–32)
CREAT SERPL-MCNC: 0.5 MG/DL (ref 0.5–1.3)
EGFRCR SERPLBLD CKD-EPI 2021: >90 ML/MIN/1.73M*2
ERYTHROCYTE [DISTWIDTH] IN BLOOD BY AUTOMATED COUNT: 12.8 % (ref 11.5–14.5)
GLUCOSE SERPL-MCNC: 108 MG/DL (ref 74–99)
HCT VFR BLD AUTO: 49.6 % (ref 41–52)
HGB BLD-MCNC: 16.2 G/DL (ref 13.5–17.5)
MAGNESIUM SERPL-MCNC: 2.1 MG/DL (ref 1.6–2.4)
MCH RBC QN AUTO: 29.9 PG (ref 26–34)
MCHC RBC AUTO-ENTMCNC: 32.7 G/DL (ref 32–36)
MCV RBC AUTO: 92 FL (ref 80–100)
NRBC BLD-RTO: 0 /100 WBCS (ref 0–0)
PHOSPHATE SERPL-MCNC: 4.1 MG/DL (ref 2.5–4.9)
PLATELET # BLD AUTO: 240 X10*3/UL (ref 150–450)
POTASSIUM SERPL-SCNC: 3.8 MMOL/L (ref 3.5–5.3)
RBC # BLD AUTO: 5.41 X10*6/UL (ref 4.5–5.9)
SODIUM SERPL-SCNC: 135 MMOL/L (ref 136–145)
WBC # BLD AUTO: 9.6 X10*3/UL (ref 4.4–11.3)

## 2025-06-08 PROCEDURE — 2500000001 HC RX 250 WO HCPCS SELF ADMINISTERED DRUGS (ALT 637 FOR MEDICARE OP)

## 2025-06-08 PROCEDURE — 2500000004 HC RX 250 GENERAL PHARMACY W/ HCPCS (ALT 636 FOR OP/ED)

## 2025-06-08 PROCEDURE — 83735 ASSAY OF MAGNESIUM: CPT

## 2025-06-08 PROCEDURE — 94640 AIRWAY INHALATION TREATMENT: CPT

## 2025-06-08 PROCEDURE — 1200000002 HC GENERAL ROOM WITH TELEMETRY DAILY

## 2025-06-08 PROCEDURE — 36415 COLL VENOUS BLD VENIPUNCTURE: CPT

## 2025-06-08 PROCEDURE — 2500000002 HC RX 250 W HCPCS SELF ADMINISTERED DRUGS (ALT 637 FOR MEDICARE OP, ALT 636 FOR OP/ED)

## 2025-06-08 PROCEDURE — 85027 COMPLETE CBC AUTOMATED: CPT

## 2025-06-08 PROCEDURE — 80069 RENAL FUNCTION PANEL: CPT

## 2025-06-08 PROCEDURE — 99233 SBSQ HOSP IP/OBS HIGH 50: CPT

## 2025-06-08 RX ORDER — ALBUTEROL SULFATE 0.83 MG/ML
2.5 SOLUTION RESPIRATORY (INHALATION) EVERY 6 HOURS
Status: DISCONTINUED | OUTPATIENT
Start: 2025-06-08 | End: 2025-06-08

## 2025-06-08 RX ORDER — ALBUTEROL SULFATE 0.83 MG/ML
2.5 SOLUTION RESPIRATORY (INHALATION) EVERY 2 HOUR PRN
Status: ACTIVE | OUTPATIENT
Start: 2025-06-08

## 2025-06-08 RX ORDER — AMLODIPINE BESYLATE 5 MG/1
5 TABLET ORAL DAILY
Status: DISPENSED | OUTPATIENT
Start: 2025-06-08

## 2025-06-08 RX ORDER — HYDRALAZINE HYDROCHLORIDE 10 MG/1
10 TABLET, FILM COATED ORAL EVERY 6 HOURS PRN
Status: DISPENSED | OUTPATIENT
Start: 2025-06-08

## 2025-06-08 RX ORDER — ALBUTEROL SULFATE 0.83 MG/ML
2.5 SOLUTION RESPIRATORY (INHALATION)
Status: ACTIVE | OUTPATIENT
Start: 2025-06-09

## 2025-06-08 RX ADMIN — PANTOPRAZOLE SODIUM 40 MG: 40 TABLET, DELAYED RELEASE ORAL at 06:00

## 2025-06-08 RX ADMIN — ALBUTEROL SULFATE 2.5 MG: 2.5 SOLUTION RESPIRATORY (INHALATION) at 20:36

## 2025-06-08 RX ADMIN — BACLOFEN 40 MG: 10 TABLET ORAL at 15:14

## 2025-06-08 RX ADMIN — PIPERACILLIN SODIUM AND TAZOBACTAM SODIUM 3.38 G: 3; .375 INJECTION, SOLUTION INTRAVENOUS at 05:17

## 2025-06-08 RX ADMIN — OXYBUTYNIN CHLORIDE 5 MG: 5 TABLET ORAL at 08:21

## 2025-06-08 RX ADMIN — HYDRALAZINE HYDROCHLORIDE 10 MG: 10 TABLET ORAL at 09:27

## 2025-06-08 RX ADMIN — AMLODIPINE BESYLATE 5 MG: 5 TABLET ORAL at 09:27

## 2025-06-08 RX ADMIN — ENOXAPARIN SODIUM 40 MG: 40 INJECTION SUBCUTANEOUS at 02:09

## 2025-06-08 RX ADMIN — OXYBUTYNIN CHLORIDE 5 MG: 5 TABLET ORAL at 20:49

## 2025-06-08 RX ADMIN — BUDESONIDE 1 MG: 0.5 INHALANT RESPIRATORY (INHALATION) at 07:47

## 2025-06-08 RX ADMIN — PIPERACILLIN SODIUM AND TAZOBACTAM SODIUM 3.38 G: 3; .375 INJECTION, SOLUTION INTRAVENOUS at 12:12

## 2025-06-08 RX ADMIN — FERROUS SULFATE TAB 325 MG (65 MG ELEMENTAL FE) 1 TABLET: 325 (65 FE) TAB at 08:21

## 2025-06-08 RX ADMIN — DOCUSATE SODIUM 200 MG: 100 CAPSULE, LIQUID FILLED ORAL at 08:21

## 2025-06-08 RX ADMIN — BUDESONIDE 1 MG: 0.5 INHALANT RESPIRATORY (INHALATION) at 20:36

## 2025-06-08 RX ADMIN — OXYBUTYNIN CHLORIDE 5 MG: 5 TABLET ORAL at 15:12

## 2025-06-08 RX ADMIN — BACLOFEN 40 MG: 10 TABLET ORAL at 08:21

## 2025-06-08 RX ADMIN — MONTELUKAST 10 MG: 10 TABLET, FILM COATED ORAL at 08:21

## 2025-06-08 RX ADMIN — POLYETHYLENE GLYCOL 3350 17 G: 17 POWDER, FOR SOLUTION ORAL at 08:21

## 2025-06-08 RX ADMIN — PIPERACILLIN SODIUM AND TAZOBACTAM SODIUM 3.38 G: 3; .375 INJECTION, SOLUTION INTRAVENOUS at 20:49

## 2025-06-08 RX ADMIN — HYDROCHLOROTHIAZIDE 25 MG: 25 TABLET ORAL at 08:21

## 2025-06-08 RX ADMIN — BACLOFEN 40 MG: 10 TABLET ORAL at 20:49

## 2025-06-08 RX ADMIN — Medication 50 MCG: at 06:00

## 2025-06-08 RX ADMIN — ALBUTEROL SULFATE 2.5 MG: 2.5 SOLUTION RESPIRATORY (INHALATION) at 07:47

## 2025-06-08 ASSESSMENT — PAIN SCALES - GENERAL
PAINLEVEL_OUTOF10: 0 - NO PAIN
PAINLEVEL_OUTOF10: 0 - NO PAIN

## 2025-06-08 ASSESSMENT — COGNITIVE AND FUNCTIONAL STATUS - GENERAL
HELP NEEDED FOR BATHING: A LOT
STANDING UP FROM CHAIR USING ARMS: TOTAL
MOBILITY SCORE: 10
PERSONAL GROOMING: A LITTLE
DRESSING REGULAR UPPER BODY CLOTHING: A LOT
TURNING FROM BACK TO SIDE WHILE IN FLAT BAD: A LITTLE
MOVING TO AND FROM BED TO CHAIR: TOTAL
CLIMB 3 TO 5 STEPS WITH RAILING: TOTAL
DAILY ACTIVITIY SCORE: 15
TOILETING: A LOT
DRESSING REGULAR LOWER BODY CLOTHING: A LOT
WALKING IN HOSPITAL ROOM: TOTAL
MOVING FROM LYING ON BACK TO SITTING ON SIDE OF FLAT BED WITH BEDRAILS: A LITTLE

## 2025-06-08 NOTE — PROGRESS NOTES
Jose Fernandez is a 25 y.o. male on day 1 of admission presenting with Cystitis.      Subjective   -Patient seen this morning resting comfortably in bed  - VSS WNL overnight with exception to hypertension with systolics in the 180s, this morning blood pressure 160/100 for which she was given as needed hydralazine 10 mg  - NAEON  - Patient's labs this morning unremarkable, urine culture from emergency department is pending, blood culture NGTD  - Patient denies all symptomatology pertaining to 12 point ROS at time of my evaluation  - Per patient and family they have requested to bring in home bowel regimen machine which we will comply with.       Objective     Last Recorded Vitals  BP (!) 163/100   Pulse 76   Temp 36.4 °C (97.5 °F)   Resp 18   Wt 106 kg (233 lb 7.5 oz)   SpO2 97%   Intake/Output last 3 Shifts:    Intake/Output Summary (Last 24 hours) at 6/8/2025 1251  Last data filed at 6/8/2025 0200  Gross per 24 hour   Intake 150 ml   Output 1500 ml   Net -1350 ml       Admission Weight  Weight: 103 kg (226 lb) (06/06/25 2109)    Daily Weight  06/07/25 : 106 kg (233 lb 7.5 oz)    Image Results  CT cervical spine w IV contrast  Narrative: Interpreted By:  Saumel Tenorio,   STUDY:  CT CERVICAL SPINE W IV CONTRAST;  6/6/2025 11:30 pm      INDICATION:  Signs/Symptoms:Elevated CRP of 67.  Spastic quadriplegia.  Indwelling  baclofen pump.  Evaluate for evidence of discitis, osteomyelitis,  abscess..          COMPARISON:  None.      ACCESSION NUMBER(S):  [id number]      ORDERING CLINICIAN:  RHETT MOE      TECHNIQUE:  Axial CT images of the cervical spine are obtained. Axial, coronal  and sagittal reconstructions are provided for review.      FINDINGS:  There is levoconvex curvature of the cervical spine, without evidence  of significant spondylolisthesis.      Cervical vertebral body heights appear preserved without evidence of  compression fractures. No definite evidence of acute trauma to  the  posterior elements of the cervical spine.      Craniocervical junction is intact. Facet joints are preserved without  evidence of traumatic subluxation or perching, with bony ankylosis  noted at C7-T1.      No significant intervertebral disc height loss is present.      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 No superimposed disc osteophyte complexes or  ligamentum flavum is identified.      Mild bony neural foraminal narrowing is present in the multiple  levels due to hypertrophic facet changes.      Prevertebral and paraspinal soft tissues do not demonstrate any acute  abnormality.      Impression: 1.  No evidence of acute bony trauma to the cervical spine.  2. 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      MACRO:  None      Signed by: Samuel Tenorio 6/7/2025 12:20 AM  Dictation workstation:   [id number]  CT chest abdomen pelvis w IV contrast, CT thoracic spine retrospective reconstruction protocol, CT lumbar spine retrospective reconstruction protocol  Narrative: Interpreted By:  Hussein Kaur,   STUDY:  CT CHEST ABDOMEN PELVIS W IV CONTRAST; CT LUMBAR SPINE RETROSPECTIVE  RECONSTRUCTION PROTOCOL; CT THORACIC SPINE RETROSPECTIVE  RECONSTRUCTION PROTOCOL;  6/6/2025 11:30 pm; 6/6/2025 11:38 pm;  6/6/2025 11:33 pm      INDICATION:  Signs/Symptoms:Elevated CRP of 67.  Spastic quadriplegia unable to  feel anything from the sternum down.  Recurrent urinary tract  infections, abdominal abscesses with baclofen pump.;  Signs/Symptoms:Elevated CRP of 67. Spastic quadriplegia. Indwelling  baclofen pump. Evaluate for evidence of discitis, osteomyelitis,  abscess.          COMPARISON:  Correlation  made to CT abdomen and pelvis of 08/06/2023.      ACCESSION NUMBER(S):  [id number]; [id number]; [id number]      ORDERING CLINICIAN:  RHETT MOE      TECHNIQUE:  CT of the chest, abdomen, and pelvis was performed.  Contiguous axial images were obtained at  5 mm slice thickness  through the chest, and at  3 mm through the abdomen and pelvis.  Coronal and sagittal reconstructions at  3 mm slice thickness were  performed.  75 ML; N/A of Omnipaque 350; N/A was administered  intravenously without immediate complication.      FINDINGS:  Evaluation is slightly limited by photon starvation secondary to  large patient body habitus and artifact from spinal fusion hardware  and baclofen pump.      CHEST:      LUNG/PLEURA/LARGE AIRWAYS:  Platelike opacity in the right lower lobe with associated parenchymal  volume loss most likely relates to atelectasis. Respiratory motion  artifact slightly limits evaluation of the lung parenchyma. No other  focal parenchymal abnormality. No pleural effusion or pneumothorax.      VESSELS:  No traumatic aortic injury is appreciated within the limitations of  this non-EKG gated study.  The thoracic aorta is of normal course and  caliber.  Main pulmonary artery and its branches are normal in  caliber.  No coronary artery calcifications are seen.      HEART:  The heart is normal in size.   There is no pericardial effusion.      MEDIASTINUM AND KRISTEN:  No pneumomediastinum, abnormal mediastinal fluid collection or  mediastinal hematoma are appreciated.  No mediastinal, hilar or  biaxillary adenopathy is present.  The esophagus is normal in course  and caliber.      CHEST WALL AND LOWER NECK:  No acute fracture or dislocation of the included osseous structures  are appreciated.  No suspicious osseous lesions are identified.  The  thoracic wall soft tissues are within normal limits.      ABDOMEN:      LIVER:  Enlarged. No focal lesion.      GALLBLADDER:  The gallbladder is nondistended without  evidence of radiopaque stone.      BILE DUCTS:  The intahepatic and extrahepatic bile ducts are not dilated.      PANCREAS:  The pancreas appears unremarkable.      SPLEEN:  No parenchymal perfusion deficit of the spleen is appreciated to  suggest contusion or laceration. There is no subcapsular hematoma, no  perisplenic fluid collection.      ADRENAL GLANDS:  The bilateral adrenal glands are unremarkable in appearance.      KIDNEYS AND URETERS:  No parenchymal perfusion deficit is appreciated in bilateral kidneys  to suggest contusion or laceration. There is no subcapsular hematoma,  no perinephric fluid collection.  No hydroureteronephrosis or  nephroureterolithiasis is present.      PELVIS:      BLADDER:  Urinary bladder is incompletely distended and suboptimally evaluated.  Suspected urothelial hyperemia and perivesical fat stranding,  suspicious for acute cystitis.      REPRODUCTIVE ORGANS:  Prostate is not enlarged.      BOWEL:  The stomach is unremarkable.  The small bowel is normal in caliber  without evidence of focal wall thickening or obstruction.  There is  no evidence of focal wall thickening or dilatation of the large  bowel.  The appendix is not definitely visualized, although there are  no pericecal inflammatory changes to suggest acute appendicitis.      VESSELS:  The aorta and IVC are within normal limits.  The principal  vasculature of the abdomen and pelvis is patent.      PERITONEUM/RETROPERITONEUM/LYMPH NODES:  There is no evidence of intra- or retroperitoneal hematoma.  There is  no free or loculated fluid collection, no free intraperitoneal air.  No abdominopelvic lymphadenopathy is present.      BONES AND ABDOMINAL WALL:  No evidence of acute fracture or dislocation of the included osseous  structures. Thoracic and lumbar and sacral fusion, with intact  orthopedic hardware. Posterior dislocation of the both hip joints. No  suspicious osseous lesions are identified.  The abdominal wall  soft  tissues appear normal. Baclofen pump.      Thoracic spine:  ALIGNMENT: Normal.  VERTEBRAE: No acute fracture. No osteolysis or periostitis.  PREVERTEBRAL SOFT TISSUES: Within normal limits.      OTHER FINDINGS: Spinal hardware is intact.  SPINAL CANAL: No critical spinal canal stenosis.      Lumbar spine:  ALIGNMENT: Normal.  VERTEBRAE: No acute fracture. No osteolysis or periostitis.  PREVERTEBRAL SOFT TISSUES: Within normal limits.      OTHER FINDINGS: Spinal hardware is intact.  SPINAL CANAL: No critical spinal canal stenosis.      Impression: Evaluation is slightly limited by photon starvation secondary to  large patient body habitus and artifact from spinal fusion hardware  and baclofen pump.      No definite evidence of acute pathology in the chest. Platelike  atelectasis in the right lower lobe. Mild cardiomegaly.      Urinary bladder is incompletely distended and suboptimally evaluated.  Suspected urothelial hyperemia and perivesical fat stranding,  suspicious for acute cystitis.      Otherwise, no definite evidence of acute pathology in the abdomen or  pelvis.      No evidence of acute thoracic or lumbar spine abnormality.      MACRO:  None      Signed by: Hussein Kaur 6/7/2025 12:10 AM  Dictation workstation:   [id number]      Physical Exam  General: Not in acute distress, A&O x 3, alert, cooperative, well-developed  HEENT: Normocephalic, atraumatic, EOMI, moist mucous membranes  Neck: Neck supple, trachea midline, no evidence of trauma  Cardiovascular: RRR, S1 and S2 appreciated, no murmurs rubs gallops appreciated, distal pulses 2+ bilaterally (radial and dorsalis pedis)  Respiratory: Vesicular breath sounds appreciated bilaterally, no adventitious sounds appreciated, no increased work of breathing  GI: Abdomen soft, nondistended, nontender to palpation, bowel sounds present, previous exploratory laparotomy scar, baclofen pump appreciable on palpation of right lower quadrant  Extremities: No edema  appreciated in lower extremities bilaterally, no cyanosis  Neuro: A&O X3, motor and sensory deficits of bilateral lower extremities, without deficits of upper extremities and strength and sensation intact bilaterally in upper extremities, twitching in bilateral lower extremities noted which is positional  Skin: Warm and dry, without lesions or rashes    Relevant Results  Scheduled medications  Scheduled Medications[1]  Continuous medications  Continuous Medications[2]  PRN medications  PRN Medications[3]  Results for orders placed or performed during the hospital encounter of 06/06/25 (from the past 24 hours)   CBC   Result Value Ref Range    WBC 9.6 4.4 - 11.3 x10*3/uL    nRBC 0.0 0.0 - 0.0 /100 WBCs    RBC 5.41 4.50 - 5.90 x10*6/uL    Hemoglobin 16.2 13.5 - 17.5 g/dL    Hematocrit 49.6 41.0 - 52.0 %    MCV 92 80 - 100 fL    MCH 29.9 26.0 - 34.0 pg    MCHC 32.7 32.0 - 36.0 g/dL    RDW 12.8 11.5 - 14.5 %    Platelets 240 150 - 450 x10*3/uL   Magnesium   Result Value Ref Range    Magnesium 2.10 1.60 - 2.40 mg/dL   Renal Function Panel   Result Value Ref Range    Glucose 108 (H) 74 - 99 mg/dL    Sodium 135 (L) 136 - 145 mmol/L    Potassium 3.8 3.5 - 5.3 mmol/L    Chloride 101 98 - 107 mmol/L    Bicarbonate 22 21 - 32 mmol/L    Anion Gap 16 10 - 20 mmol/L    Urea Nitrogen 9 6 - 23 mg/dL    Creatinine 0.50 0.50 - 1.30 mg/dL    eGFR >90 >60 mL/min/1.73m*2    Calcium 9.3 8.6 - 10.3 mg/dL    Phosphorus 4.1 2.5 - 4.9 mg/dL    Albumin 4.3 3.4 - 5.0 g/dL     *Note: Due to a large number of results and/or encounters for the requested time period, some results have not been displayed. A complete set of results can be found in Results Review.     CT cervical spine w IV contrast  Result Date: 6/7/2025  Interpreted By:  Samuel Tenorio, STUDY: CT CERVICAL SPINE W IV CONTRAST;  6/6/2025 11:30 pm   INDICATION: Signs/Symptoms:Elevated CRP of 67.  Spastic quadriplegia.  Indwelling baclofen pump.  Evaluate for evidence of  discitis, osteomyelitis, abscess..     COMPARISON: None.   ACCESSION NUMBER(S): [id number]   ORDERING CLINICIAN: RHETT MOE   TECHNIQUE: Axial CT images of the cervical spine are obtained. Axial, coronal and sagittal reconstructions are provided for review.   FINDINGS: There is levoconvex curvature of the cervical spine, without evidence of significant spondylolisthesis.   Cervical vertebral body heights appear preserved without evidence of compression fractures. No definite evidence of acute trauma to the posterior elements of the cervical spine.   Craniocervical junction is intact. Facet joints are preserved without evidence of traumatic subluxation or perching, with bony ankylosis noted at C7-T1.   No significant intervertebral disc height loss is present.   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 No superimposed disc osteophyte complexes or ligamentum flavum is identified.   Mild bony neural foraminal narrowing is present in the multiple levels due to hypertrophic facet changes.   Prevertebral and paraspinal soft tissues do not demonstrate any acute abnormality.       1.  No evidence of acute bony trauma to the cervical spine. 2. 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   MACRO: None   Signed by: Samuel Tenorio 6/7/2025 12:20 AM Dictation workstation:   [id number]    CT chest abdomen pelvis w IV contrast  Result Date: 6/7/2025  Interpreted By:  Hussein Kaur, STUDY: CT CHEST ABDOMEN PELVIS W IV CONTRAST; CT LUMBAR SPINE RETROSPECTIVE RECONSTRUCTION PROTOCOL; CT THORACIC SPINE RETROSPECTIVE RECONSTRUCTION PROTOCOL;  6/6/2025 11:30 pm; 6/6/2025 11:38 pm; 6/6/2025 11:33 pm   INDICATION:  Signs/Symptoms:Elevated CRP of 67.  Spastic quadriplegia unable to feel anything from the sternum down.  Recurrent urinary tract infections, abdominal abscesses with baclofen pump.; Signs/Symptoms:Elevated CRP of 67. Spastic quadriplegia. Indwelling baclofen pump. Evaluate for evidence of discitis, osteomyelitis, abscess.     COMPARISON: Correlation made to CT abdomen and pelvis of 08/06/2023.   ACCESSION NUMBER(S): [id number]; [id number]; [id number]   ORDERING CLINICIAN: RHETT MOE   TECHNIQUE: CT of the chest, abdomen, and pelvis was performed. Contiguous axial images were obtained at  5 mm slice thickness through the chest, and at  3 mm through the abdomen and pelvis. Coronal and sagittal reconstructions at  3 mm slice thickness were performed.  75 ML; N/A of Omnipaque 350; N/A was administered intravenously without immediate complication.   FINDINGS: Evaluation is slightly limited by photon starvation secondary to large patient body habitus and artifact from spinal fusion hardware and baclofen pump.   CHEST:   LUNG/PLEURA/LARGE AIRWAYS: Platelike opacity in the right lower lobe with associated parenchymal volume loss most likely relates to atelectasis. Respiratory motion artifact slightly limits evaluation of the lung parenchyma. No other focal parenchymal abnormality. No pleural effusion or pneumothorax.   VESSELS: No traumatic aortic injury is appreciated within the limitations of this non-EKG gated study.  The thoracic aorta is of normal course and caliber.  Main pulmonary artery and its branches are normal in caliber.  No coronary artery calcifications are seen.   HEART: The heart is normal in size.   There is no pericardial effusion.   MEDIASTINUM AND KRISTEN: No pneumomediastinum, abnormal mediastinal fluid collection or mediastinal hematoma are appreciated.  No mediastinal, hilar or biaxillary adenopathy is present.  The esophagus is normal in course and caliber.   CHEST WALL AND LOWER NECK: No acute  fracture or dislocation of the included osseous structures are appreciated.  No suspicious osseous lesions are identified.  The thoracic wall soft tissues are within normal limits.   ABDOMEN:   LIVER: Enlarged. No focal lesion.   GALLBLADDER: The gallbladder is nondistended without evidence of radiopaque stone.   BILE DUCTS: The intahepatic and extrahepatic bile ducts are not dilated.   PANCREAS: The pancreas appears unremarkable.   SPLEEN: No parenchymal perfusion deficit of the spleen is appreciated to suggest contusion or laceration. There is no subcapsular hematoma, no perisplenic fluid collection.   ADRENAL GLANDS: The bilateral adrenal glands are unremarkable in appearance.   KIDNEYS AND URETERS: No parenchymal perfusion deficit is appreciated in bilateral kidneys to suggest contusion or laceration. There is no subcapsular hematoma, no perinephric fluid collection.  No hydroureteronephrosis or nephroureterolithiasis is present.   PELVIS:   BLADDER: Urinary bladder is incompletely distended and suboptimally evaluated. Suspected urothelial hyperemia and perivesical fat stranding, suspicious for acute cystitis.   REPRODUCTIVE ORGANS: Prostate is not enlarged.   BOWEL: The stomach is unremarkable.  The small bowel is normal in caliber without evidence of focal wall thickening or obstruction.  There is no evidence of focal wall thickening or dilatation of the large bowel.  The appendix is not definitely visualized, although there are no pericecal inflammatory changes to suggest acute appendicitis.   VESSELS: The aorta and IVC are within normal limits.  The principal vasculature of the abdomen and pelvis is patent.   PERITONEUM/RETROPERITONEUM/LYMPH NODES: There is no evidence of intra- or retroperitoneal hematoma.  There is no free or loculated fluid collection, no free intraperitoneal air. No abdominopelvic lymphadenopathy is present.   BONES AND ABDOMINAL WALL: No evidence of acute fracture or dislocation of the  included osseous structures. Thoracic and lumbar and sacral fusion, with intact orthopedic hardware. Posterior dislocation of the both hip joints. No suspicious osseous lesions are identified.  The abdominal wall soft tissues appear normal. Baclofen pump.   Thoracic spine: ALIGNMENT: Normal. VERTEBRAE: No acute fracture. No osteolysis or periostitis. PREVERTEBRAL SOFT TISSUES: Within normal limits.   OTHER FINDINGS: Spinal hardware is intact. SPINAL CANAL: No critical spinal canal stenosis.   Lumbar spine: ALIGNMENT: Normal. VERTEBRAE: No acute fracture. No osteolysis or periostitis. PREVERTEBRAL SOFT TISSUES: Within normal limits.   OTHER FINDINGS: Spinal hardware is intact. SPINAL CANAL: No critical spinal canal stenosis.       Evaluation is slightly limited by photon starvation secondary to large patient body habitus and artifact from spinal fusion hardware and baclofen pump.   No definite evidence of acute pathology in the chest. Platelike atelectasis in the right lower lobe. Mild cardiomegaly.   Urinary bladder is incompletely distended and suboptimally evaluated. Suspected urothelial hyperemia and perivesical fat stranding, suspicious for acute cystitis.   Otherwise, no definite evidence of acute pathology in the abdomen or pelvis.   No evidence of acute thoracic or lumbar spine abnormality.   MACRO: None   Signed by: Hussein Kaur 6/7/2025 12:10 AM Dictation workstation:   [id number]    CT thoracic spine retrospective reconstruction protocol  Result Date: 6/7/2025  Interpreted By:  Hussein Kaur, STUDY: CT CHEST ABDOMEN PELVIS W IV CONTRAST; CT LUMBAR SPINE RETROSPECTIVE RECONSTRUCTION PROTOCOL; CT THORACIC SPINE RETROSPECTIVE RECONSTRUCTION PROTOCOL;  6/6/2025 11:30 pm; 6/6/2025 11:38 pm; 6/6/2025 11:33 pm   INDICATION: Signs/Symptoms:Elevated CRP of 67.  Spastic quadriplegia unable to feel anything from the sternum down.  Recurrent urinary tract infections, abdominal abscesses with baclofen pump.;  Signs/Symptoms:Elevated CRP of 67. Spastic quadriplegia. Indwelling baclofen pump. Evaluate for evidence of discitis, osteomyelitis, abscess.     COMPARISON: Correlation made to CT abdomen and pelvis of 08/06/2023.   ACCESSION NUMBER(S): [id number]; [id number]; [id number]   ORDERING CLINICIAN: RHETT MOE   TECHNIQUE: CT of the chest, abdomen, and pelvis was performed. Contiguous axial images were obtained at  5 mm slice thickness through the chest, and at  3 mm through the abdomen and pelvis. Coronal and sagittal reconstructions at  3 mm slice thickness were performed.  75 ML; N/A of Omnipaque 350; N/A was administered intravenously without immediate complication.   FINDINGS: Evaluation is slightly limited by photon starvation secondary to large patient body habitus and artifact from spinal fusion hardware and baclofen pump.   CHEST:   LUNG/PLEURA/LARGE AIRWAYS: Platelike opacity in the right lower lobe with associated parenchymal volume loss most likely relates to atelectasis. Respiratory motion artifact slightly limits evaluation of the lung parenchyma. No other focal parenchymal abnormality. No pleural effusion or pneumothorax.   VESSELS: No traumatic aortic injury is appreciated within the limitations of this non-EKG gated study.  The thoracic aorta is of normal course and caliber.  Main pulmonary artery and its branches are normal in caliber.  No coronary artery calcifications are seen.   HEART: The heart is normal in size.   There is no pericardial effusion.   MEDIASTINUM AND KRISTEN: No pneumomediastinum, abnormal mediastinal fluid collection or mediastinal hematoma are appreciated.  No mediastinal, hilar or biaxillary adenopathy is present.  The esophagus is normal in course and caliber.   CHEST WALL AND LOWER NECK: No acute fracture or dislocation of the included osseous structures are appreciated.  No suspicious osseous lesions are identified.  The thoracic wall soft tissues are within normal limits.    ABDOMEN:   LIVER: Enlarged. No focal lesion.   GALLBLADDER: The gallbladder is nondistended without evidence of radiopaque stone.   BILE DUCTS: The intahepatic and extrahepatic bile ducts are not dilated.   PANCREAS: The pancreas appears unremarkable.   SPLEEN: No parenchymal perfusion deficit of the spleen is appreciated to suggest contusion or laceration. There is no subcapsular hematoma, no perisplenic fluid collection.   ADRENAL GLANDS: The bilateral adrenal glands are unremarkable in appearance.   KIDNEYS AND URETERS: No parenchymal perfusion deficit is appreciated in bilateral kidneys to suggest contusion or laceration. There is no subcapsular hematoma, no perinephric fluid collection.  No hydroureteronephrosis or nephroureterolithiasis is present.   PELVIS:   BLADDER: Urinary bladder is incompletely distended and suboptimally evaluated. Suspected urothelial hyperemia and perivesical fat stranding, suspicious for acute cystitis.   REPRODUCTIVE ORGANS: Prostate is not enlarged.   BOWEL: The stomach is unremarkable.  The small bowel is normal in caliber without evidence of focal wall thickening or obstruction.  There is no evidence of focal wall thickening or dilatation of the large bowel.  The appendix is not definitely visualized, although there are no pericecal inflammatory changes to suggest acute appendicitis.   VESSELS: The aorta and IVC are within normal limits.  The principal vasculature of the abdomen and pelvis is patent.   PERITONEUM/RETROPERITONEUM/LYMPH NODES: There is no evidence of intra- or retroperitoneal hematoma.  There is no free or loculated fluid collection, no free intraperitoneal air. No abdominopelvic lymphadenopathy is present.   BONES AND ABDOMINAL WALL: No evidence of acute fracture or dislocation of the included osseous structures. Thoracic and lumbar and sacral fusion, with intact orthopedic hardware. Posterior dislocation of the both hip joints. No suspicious osseous lesions are  identified.  The abdominal wall soft tissues appear normal. Baclofen pump.   Thoracic spine: ALIGNMENT: Normal. VERTEBRAE: No acute fracture. No osteolysis or periostitis. PREVERTEBRAL SOFT TISSUES: Within normal limits.   OTHER FINDINGS: Spinal hardware is intact. SPINAL CANAL: No critical spinal canal stenosis.   Lumbar spine: ALIGNMENT: Normal. VERTEBRAE: No acute fracture. No osteolysis or periostitis. PREVERTEBRAL SOFT TISSUES: Within normal limits.   OTHER FINDINGS: Spinal hardware is intact. SPINAL CANAL: No critical spinal canal stenosis.       Evaluation is slightly limited by photon starvation secondary to large patient body habitus and artifact from spinal fusion hardware and baclofen pump.   No definite evidence of acute pathology in the chest. Platelike atelectasis in the right lower lobe. Mild cardiomegaly.   Urinary bladder is incompletely distended and suboptimally evaluated. Suspected urothelial hyperemia and perivesical fat stranding, suspicious for acute cystitis.   Otherwise, no definite evidence of acute pathology in the abdomen or pelvis.   No evidence of acute thoracic or lumbar spine abnormality.   MACRO: None   Signed by: Hussein Kaur 6/7/2025 12:10 AM Dictation workstation:   [id number]    CT lumbar spine retrospective reconstruction protocol  Result Date: 6/7/2025  Interpreted By:  Hussein Kaur, STUDY: CT CHEST ABDOMEN PELVIS W IV CONTRAST; CT LUMBAR SPINE RETROSPECTIVE RECONSTRUCTION PROTOCOL; CT THORACIC SPINE RETROSPECTIVE RECONSTRUCTION PROTOCOL;  6/6/2025 11:30 pm; 6/6/2025 11:38 pm; 6/6/2025 11:33 pm   INDICATION: Signs/Symptoms:Elevated CRP of 67.  Spastic quadriplegia unable to feel anything from the sternum down.  Recurrent urinary tract infections, abdominal abscesses with baclofen pump.; Signs/Symptoms:Elevated CRP of 67. Spastic quadriplegia. Indwelling baclofen pump. Evaluate for evidence of discitis, osteomyelitis, abscess.     COMPARISON: Correlation made to CT abdomen and  pelvis of 08/06/2023.   ACCESSION NUMBER(S): [id number]; [id number]; [id number]   ORDERING CLINICIAN: RHETT MOE   TECHNIQUE: CT of the chest, abdomen, and pelvis was performed. Contiguous axial images were obtained at  5 mm slice thickness through the chest, and at  3 mm through the abdomen and pelvis. Coronal and sagittal reconstructions at  3 mm slice thickness were performed.  75 ML; N/A of Omnipaque 350; N/A was administered intravenously without immediate complication.   FINDINGS: Evaluation is slightly limited by photon starvation secondary to large patient body habitus and artifact from spinal fusion hardware and baclofen pump.   CHEST:   LUNG/PLEURA/LARGE AIRWAYS: Platelike opacity in the right lower lobe with associated parenchymal volume loss most likely relates to atelectasis. Respiratory motion artifact slightly limits evaluation of the lung parenchyma. No other focal parenchymal abnormality. No pleural effusion or pneumothorax.   VESSELS: No traumatic aortic injury is appreciated within the limitations of this non-EKG gated study.  The thoracic aorta is of normal course and caliber.  Main pulmonary artery and its branches are normal in caliber.  No coronary artery calcifications are seen.   HEART: The heart is normal in size.   There is no pericardial effusion.   MEDIASTINUM AND KRISTEN: No pneumomediastinum, abnormal mediastinal fluid collection or mediastinal hematoma are appreciated.  No mediastinal, hilar or biaxillary adenopathy is present.  The esophagus is normal in course and caliber.   CHEST WALL AND LOWER NECK: No acute fracture or dislocation of the included osseous structures are appreciated.  No suspicious osseous lesions are identified.  The thoracic wall soft tissues are within normal limits.   ABDOMEN:   LIVER: Enlarged. No focal lesion.   GALLBLADDER: The gallbladder is nondistended without evidence of radiopaque stone.   BILE DUCTS: The intahepatic and extrahepatic bile ducts are  not dilated.   PANCREAS: The pancreas appears unremarkable.   SPLEEN: No parenchymal perfusion deficit of the spleen is appreciated to suggest contusion or laceration. There is no subcapsular hematoma, no perisplenic fluid collection.   ADRENAL GLANDS: The bilateral adrenal glands are unremarkable in appearance.   KIDNEYS AND URETERS: No parenchymal perfusion deficit is appreciated in bilateral kidneys to suggest contusion or laceration. There is no subcapsular hematoma, no perinephric fluid collection.  No hydroureteronephrosis or nephroureterolithiasis is present.   PELVIS:   BLADDER: Urinary bladder is incompletely distended and suboptimally evaluated. Suspected urothelial hyperemia and perivesical fat stranding, suspicious for acute cystitis.   REPRODUCTIVE ORGANS: Prostate is not enlarged.   BOWEL: The stomach is unremarkable.  The small bowel is normal in caliber without evidence of focal wall thickening or obstruction.  There is no evidence of focal wall thickening or dilatation of the large bowel.  The appendix is not definitely visualized, although there are no pericecal inflammatory changes to suggest acute appendicitis.   VESSELS: The aorta and IVC are within normal limits.  The principal vasculature of the abdomen and pelvis is patent.   PERITONEUM/RETROPERITONEUM/LYMPH NODES: There is no evidence of intra- or retroperitoneal hematoma.  There is no free or loculated fluid collection, no free intraperitoneal air. No abdominopelvic lymphadenopathy is present.   BONES AND ABDOMINAL WALL: No evidence of acute fracture or dislocation of the included osseous structures. Thoracic and lumbar and sacral fusion, with intact orthopedic hardware. Posterior dislocation of the both hip joints. No suspicious osseous lesions are identified.  The abdominal wall soft tissues appear normal. Baclofen pump.   Thoracic spine: ALIGNMENT: Normal. VERTEBRAE: No acute fracture. No osteolysis or periostitis. PREVERTEBRAL SOFT  TISSUES: Within normal limits.   OTHER FINDINGS: Spinal hardware is intact. SPINAL CANAL: No critical spinal canal stenosis.   Lumbar spine: ALIGNMENT: Normal. VERTEBRAE: No acute fracture. No osteolysis or periostitis. PREVERTEBRAL SOFT TISSUES: Within normal limits.   OTHER FINDINGS: Spinal hardware is intact. SPINAL CANAL: No critical spinal canal stenosis.       Evaluation is slightly limited by photon starvation secondary to large patient body habitus and artifact from spinal fusion hardware and baclofen pump.   No definite evidence of acute pathology in the chest. Platelike atelectasis in the right lower lobe. Mild cardiomegaly.   Urinary bladder is incompletely distended and suboptimally evaluated. Suspected urothelial hyperemia and perivesical fat stranding, suspicious for acute cystitis.   Otherwise, no definite evidence of acute pathology in the abdomen or pelvis.   No evidence of acute thoracic or lumbar spine abnormality.   MACRO: None   Signed by: Hussein Kaur 6/7/2025 12:10 AM Dictation workstation:   [id number]      Assessment & Plan  Cystitis    Mr. Jose Fernandez is a 25 y.o. 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 from home with elevated CRP and ESR on outpatient labs. Patient seen and examined with mother and girlfriend at bedside in the emergency department and is a good historian.  Patient was found to have elevated CRP of 67.9 and ESR of 29 on outpatient labs ordered by his pediatric orthopedic surgeon Dr. Riley  while being worked up for worsening spasticity in his legs.  Patient was found to have urinalysis concerning for UTI, as well as CT imaging findings concerning for cystitis, and he was subsequently admitted for IV antimicrobial therapy in the setting of varying  resistances on microbial growth of previous urine cultures.    # Acute cystitis  #Hx of recurrent Klebsiella UTI with varying resistances as well as Pseudomonas UTI  #Neurogenic bladder  - Continues to remain afebrile and vitally stable  - Patient has had multiple urinary cultures growing Klebsiella pneumoniae, Citrobacter freundii, Pseudomonas aeruginosa with varying resistances  -Infectious diseases consulted for antimicrobial recommendations as well as prophylactic antimicrobial recommendations on discharge.  -->Continue Zosyn while awaiting urine culture  - Continue to monitor for leukocytosis, and fever  -Will taper antimicrobial therapy pending culture results, likely home-going on oral antimicrobial therapy versus prophylactic antimicrobial therapy    #HTN  Patient has a history of hypertension however is not on any antihypertensive medications at home  - Initiated hydrochlorothiazide 25 mg daily here with refractory hypertension, will add amlodipine 5 mg to be started today 6/8/2025  --> Will also have additional hydralazine 10 mg oral every 6 hours as needed for severe hypertension  - Patient has been counseled on taking his blood pressure twice daily at home on discharge and following with his primary care provider for good antihypertensive regimen or tapering of aforementioned regimen     Chronic conditions:  #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    Brian Roman MD  Internal medicine PGY 2         [1] albuterol, 2.5  mg, nebulization, q6h  amLODIPine, 5 mg, oral, Daily  baclofen, 40 mg, oral, TID  budesonide, 1 mg, nebulization, BID  cholecalciferol, 50 mcg, oral, Daily  docusate sodium, 200 mg, oral, Daily  enoxaparin, 40 mg, subcutaneous, q24h  ferrous sulfate, 65 mg of elemental iron, oral, Daily  hydroCHLOROthiazide, 25 mg, oral, Daily  montelukast, 10 mg, oral, Daily  oxyBUTYnin, 5 mg, oral, TID  pantoprazole, 40 mg, oral, Daily before breakfast  piperacillin-tazobactam, 3.375 g, intravenous, q8h  polyethylene glycol, 17 g, oral, Daily  [2]    [3] PRN medications: hydrALAZINE

## 2025-06-08 NOTE — NURSING NOTE
0700: Assumed care of pt    0820: Pt is lying in bed with HOB elevated. Facial symmetry is even and speech is clear. Pt answers questions appropriately and follows simple commands. Skin is warm, dry and intact. Color is WNL for ethnicity. Pt denies any pain or discomfort at this time. He does states that he is having some spasm increases since being here. AM medications taken as ordered. /107. MD advised. NNO    0910: BP rechecked 163/100 -  MD advised. New orders obtained for Amlodipine and PRN hydralazine.     1100: /90     EOS Note: Pt has remained HDS this shift. Medications taken as ordered. Pt completed daily bowel regimen with family. (L BM noted) Pt cont to self cath with no issues noted. Pt denies any pain or discomfort. Medications taken as ordered, safety maintained. Call light and fluids within reach.

## 2025-06-08 NOTE — NURSING NOTE
EOS:  Slept well.  Up in wheelchair early in the shift, returned to bed with assistance of girlfriend who remained at bedside all night.  Self caths independently.  VSS.

## 2025-06-08 NOTE — CARE PLAN
The patient's goals for the shift include sleep    The clinical goals for the shift include See plan of care

## 2025-06-08 NOTE — PROGRESS NOTES
For follow-up of:  UTI    Subjective   Interval History:  He has no complaints this morning       Objective     Current Medications[1]     Last Recorded Vitals  BP (!) 163/100   Pulse 76   Temp 36.4 °C (97.5 °F)   Resp 18   Wt 106 kg (233 lb 7.5 oz)   SpO2 97%   General: no acute distress, lying in bed, interactive, mother at bedside  HEENT: pharynx not examined  CVS: RRR  Resp: decreased breath sounds in bases  ABD: soft, NT, ND  EXT: no edema  Skin: no rash     Relevant Results:    Labs:   Results from last 72 hours   Lab Units 06/08/25  0650 06/07/25  0544 06/06/25  2213   SODIUM mmol/L 135* 139 140   POTASSIUM mmol/L 3.8 3.6 3.4*   CHLORIDE mmol/L 101 103 105   BUN mg/dL 9 14 12   CREATININE mg/dL 0.50 0.57 0.54   MAGNESIUM mg/dL 2.10 2.07  --    PHOSPHORUS mg/dL 4.1 4.0  --      Results from last 72 hours   Lab Units 06/08/25  0650 06/07/25  0544 06/06/25  2213   WBC AUTO x10*3/uL 9.6 10.9 10.1   HEMOGLOBIN g/dL 16.2 14.4 15.5   HEMATOCRIT % 49.6 43.3 45.4   PLATELETS AUTO x10*3/uL 240 309 332       Microbiology data: I have personally and independently reviewed and intrepreted the lab results  6/6/2025 blood cultures are pending  6/6/2025 urine cultures pending  6/7/2025 urine culture is pending    Imaging data: I have personally and independently reviewed and interpreted the imaging studies  6/6/2025 CT chest/abdomen/pelvis/spine shows no acute issues     Assessment/Plan     MY IMPRESSION & RECOMMENDATIONS:  Pyuria with concern for recurrent UTI, being treated with IV antibiotics.  We can keep him on empiric Zosyn and adjust the treatment when the cultures are reported.     - Continue Zosyn  - Await urine culture and blood culture result     Other issues:  #Quadriplegia with neurogenic bowel and bladder status post Botox complicated by spasticity status post baclofen pump. He does intermittent straight caths  #History of recurrent UTIs     ~I have personally and independently reviewed and interpreted the  laboratory tests, imaging studies, and the documentation from other healthcare providers.  I am monitoring for side effects from Zosyn as outlined in a previous note. His prognosis remains uncertain.       Justin Ware MD          [1]   Current Facility-Administered Medications   Medication Dose Route Frequency Provider Last Rate Last Admin    albuterol 2.5 mg /3 mL (0.083 %) nebulizer solution 2.5 mg  2.5 mg nebulization q6h Brian Roman MD        amLODIPine (Norvasc) tablet 5 mg  5 mg oral Daily Brian Roman MD   5 mg at 06/08/25 0927    baclofen (Lioresal) tablet 40 mg  40 mg oral TID Brian Roman MD   40 mg at 06/08/25 0821    budesonide (Pulmicort) 0.5 mg/2 mL nebulizer solution 1 mg  1 mg nebulization BID Brian Roman MD   1 mg at 06/08/25 0747    cholecalciferol (Vitamin D-3) tablet 50 mcg  50 mcg oral Daily Brian Roman MD   50 mcg at 06/08/25 0600    docusate sodium (Colace) capsule 200 mg  200 mg oral Daily Brian Roman MD   200 mg at 06/08/25 0821    enoxaparin (Lovenox) syringe 40 mg  40 mg subcutaneous q24h Perla Richey DO   40 mg at 06/08/25 0209    ferrous sulfate 325 mg (65 mg elemental) tablet 1 tablet  65 mg of elemental iron oral Daily Brian Roman MD   1 tablet at 06/08/25 0821    hydrALAZINE (Apresoline) tablet 10 mg  10 mg oral q6h PRN Brian Roman MD   10 mg at 06/08/25 0927    hydroCHLOROthiazide (HYDRODiuril) tablet 25 mg  25 mg oral Daily Brian Roman MD   25 mg at 06/08/25 0821    montelukast (Singulair) tablet 10 mg  10 mg oral Daily Brian Roman MD   10 mg at 06/08/25 0821    oxyBUTYnin (Ditropan) tablet 5 mg  5 mg oral TID Brian Roman MD   5 mg at 06/08/25 0821    pantoprazole (ProtoNix) EC tablet 40 mg  40 mg oral Daily before breakfast Brian Roman MD   40 mg at 06/08/25 0600    piperacillin-tazobactam (Zosyn) 3.375 g in dextrose (iso) IV 50 mL  3.375 g intravenous q8h Perla Richey DO   Stopped at 06/08/25 0954    polyethylene glycol (Glycolax,  Miralax) packet 17 g  17 g oral Daily Brian Roman MD   17 g at 06/08/25 0806

## 2025-06-09 ENCOUNTER — PHARMACY VISIT (OUTPATIENT)
Dept: PHARMACY | Facility: CLINIC | Age: 25
End: 2025-06-09
Payer: COMMERCIAL

## 2025-06-09 VITALS
SYSTOLIC BLOOD PRESSURE: 142 MMHG | OXYGEN SATURATION: 98 % | HEIGHT: 69 IN | HEART RATE: 97 BPM | BODY MASS INDEX: 34.58 KG/M2 | DIASTOLIC BLOOD PRESSURE: 89 MMHG | RESPIRATION RATE: 12 BRPM | WEIGHT: 233.47 LBS | TEMPERATURE: 97.7 F

## 2025-06-09 LAB
ALBUMIN SERPL BCP-MCNC: 4.5 G/DL (ref 3.4–5)
ANION GAP SERPL CALC-SCNC: 18 MMOL/L
BACTERIA UR CULT: ABNORMAL
BUN SERPL-MCNC: 7 MG/DL (ref 6–23)
CALCIUM SERPL-MCNC: 9.7 MG/DL (ref 8.6–10.3)
CHLORIDE SERPL-SCNC: 100 MMOL/L (ref 98–107)
CO2 SERPL-SCNC: 24 MMOL/L (ref 21–32)
CREAT SERPL-MCNC: 0.49 MG/DL (ref 0.5–1.3)
EGFRCR SERPLBLD CKD-EPI 2021: >90 ML/MIN/1.73M*2
ERYTHROCYTE [DISTWIDTH] IN BLOOD BY AUTOMATED COUNT: 12.6 % (ref 11.5–14.5)
GLUCOSE SERPL-MCNC: 116 MG/DL (ref 74–99)
HCT VFR BLD AUTO: 47.9 % (ref 41–52)
HGB BLD-MCNC: 16.1 G/DL (ref 13.5–17.5)
MAGNESIUM SERPL-MCNC: 2.2 MG/DL (ref 1.6–2.4)
MCH RBC QN AUTO: 29.4 PG (ref 26–34)
MCHC RBC AUTO-ENTMCNC: 33.6 G/DL (ref 32–36)
MCV RBC AUTO: 88 FL (ref 80–100)
NRBC BLD-RTO: 0 /100 WBCS (ref 0–0)
PHOSPHATE SERPL-MCNC: 4 MG/DL (ref 2.5–4.9)
PLATELET # BLD AUTO: 373 X10*3/UL (ref 150–450)
POTASSIUM SERPL-SCNC: 3.4 MMOL/L (ref 3.5–5.3)
RBC # BLD AUTO: 5.47 X10*6/UL (ref 4.5–5.9)
SODIUM SERPL-SCNC: 139 MMOL/L (ref 136–145)
WBC # BLD AUTO: 10.7 X10*3/UL (ref 4.4–11.3)

## 2025-06-09 PROCEDURE — 2500000001 HC RX 250 WO HCPCS SELF ADMINISTERED DRUGS (ALT 637 FOR MEDICARE OP)

## 2025-06-09 PROCEDURE — 2500000002 HC RX 250 W HCPCS SELF ADMINISTERED DRUGS (ALT 637 FOR MEDICARE OP, ALT 636 FOR OP/ED)

## 2025-06-09 PROCEDURE — 99239 HOSP IP/OBS DSCHRG MGMT >30: CPT

## 2025-06-09 PROCEDURE — 80069 RENAL FUNCTION PANEL: CPT | Performed by: STUDENT IN AN ORGANIZED HEALTH CARE EDUCATION/TRAINING PROGRAM

## 2025-06-09 PROCEDURE — 36415 COLL VENOUS BLD VENIPUNCTURE: CPT | Performed by: STUDENT IN AN ORGANIZED HEALTH CARE EDUCATION/TRAINING PROGRAM

## 2025-06-09 PROCEDURE — 2500000004 HC RX 250 GENERAL PHARMACY W/ HCPCS (ALT 636 FOR OP/ED)

## 2025-06-09 PROCEDURE — 2500000002 HC RX 250 W HCPCS SELF ADMINISTERED DRUGS (ALT 637 FOR MEDICARE OP, ALT 636 FOR OP/ED): Performed by: STUDENT IN AN ORGANIZED HEALTH CARE EDUCATION/TRAINING PROGRAM

## 2025-06-09 PROCEDURE — 85027 COMPLETE CBC AUTOMATED: CPT | Performed by: STUDENT IN AN ORGANIZED HEALTH CARE EDUCATION/TRAINING PROGRAM

## 2025-06-09 PROCEDURE — 83735 ASSAY OF MAGNESIUM: CPT | Performed by: STUDENT IN AN ORGANIZED HEALTH CARE EDUCATION/TRAINING PROGRAM

## 2025-06-09 PROCEDURE — RXMED WILLOW AMBULATORY MEDICATION CHARGE

## 2025-06-09 PROCEDURE — 94640 AIRWAY INHALATION TREATMENT: CPT

## 2025-06-09 RX ORDER — AMLODIPINE BESYLATE 5 MG/1
5 TABLET ORAL DAILY
Qty: 30 TABLET | Refills: 0 | Status: SHIPPED | OUTPATIENT
Start: 2025-06-10

## 2025-06-09 RX ORDER — NITROFURANTOIN 25; 75 MG/1; MG/1
100 CAPSULE ORAL 2 TIMES DAILY
Qty: 60 CAPSULE | Refills: 1 | Status: SHIPPED | OUTPATIENT
Start: 2025-06-09 | End: 2025-07-09

## 2025-06-09 RX ORDER — POLYETHYLENE GLYCOL 3350 17 G/17G
17 POWDER, FOR SOLUTION ORAL DAILY
Qty: 1700 G | Refills: 0 | Status: SHIPPED | OUTPATIENT
Start: 2025-06-09

## 2025-06-09 RX ORDER — POTASSIUM CHLORIDE 20 MEQ/1
20 TABLET, EXTENDED RELEASE ORAL ONCE
Status: COMPLETED | OUTPATIENT
Start: 2025-06-09 | End: 2025-06-09

## 2025-06-09 RX ADMIN — MONTELUKAST 10 MG: 10 TABLET, FILM COATED ORAL at 08:25

## 2025-06-09 RX ADMIN — BACLOFEN 40 MG: 10 TABLET ORAL at 08:25

## 2025-06-09 RX ADMIN — ALBUTEROL SULFATE 2.5 MG: 2.5 SOLUTION RESPIRATORY (INHALATION) at 14:15

## 2025-06-09 RX ADMIN — POTASSIUM CHLORIDE 20 MEQ: 1500 TABLET, EXTENDED RELEASE ORAL at 09:52

## 2025-06-09 RX ADMIN — Medication 50 MCG: at 06:20

## 2025-06-09 RX ADMIN — HYDROCHLOROTHIAZIDE 25 MG: 25 TABLET ORAL at 08:25

## 2025-06-09 RX ADMIN — POLYETHYLENE GLYCOL 3350 17 G: 17 POWDER, FOR SOLUTION ORAL at 08:26

## 2025-06-09 RX ADMIN — PIPERACILLIN SODIUM AND TAZOBACTAM SODIUM 3.38 G: 3; .375 INJECTION, SOLUTION INTRAVENOUS at 04:33

## 2025-06-09 RX ADMIN — DOCUSATE SODIUM 200 MG: 100 CAPSULE, LIQUID FILLED ORAL at 08:25

## 2025-06-09 RX ADMIN — OXYBUTYNIN CHLORIDE 5 MG: 5 TABLET ORAL at 08:25

## 2025-06-09 RX ADMIN — ENOXAPARIN SODIUM 40 MG: 40 INJECTION SUBCUTANEOUS at 02:34

## 2025-06-09 RX ADMIN — PIPERACILLIN SODIUM AND TAZOBACTAM SODIUM 3.38 G: 3; .375 INJECTION, SOLUTION INTRAVENOUS at 13:06

## 2025-06-09 RX ADMIN — PANTOPRAZOLE SODIUM 40 MG: 40 TABLET, DELAYED RELEASE ORAL at 06:20

## 2025-06-09 RX ADMIN — BUDESONIDE 1 MG: 0.5 INHALANT RESPIRATORY (INHALATION) at 08:40

## 2025-06-09 RX ADMIN — ALBUTEROL SULFATE 2.5 MG: 2.5 SOLUTION RESPIRATORY (INHALATION) at 08:40

## 2025-06-09 RX ADMIN — FERROUS SULFATE TAB 325 MG (65 MG ELEMENTAL FE) 1 TABLET: 325 (65 FE) TAB at 08:25

## 2025-06-09 ASSESSMENT — PAIN SCALES - GENERAL: PAINLEVEL_OUTOF10: 0 - NO PAIN

## 2025-06-09 NOTE — DISCHARGE SUMMARY
Discharge Diagnosis  Cystitis       Issues Requiring Follow-Up  Recurrent UTI  Hypertension     Discharge Meds     Medication List      START taking these medications     amLODIPine 5 mg tablet; Commonly known as: Norvasc; Take 1 tablet (5 mg)   by mouth once daily.; Start taking on: Randi 10, 2025   nitrofurantoin (macrocrystal-monohydrate) 100 mg capsule; Commonly known   as: Macrobid; Take 1 capsule (100 mg) by mouth 2 times a day.     CONTINUE taking these medications     albuterol 2.5 mg /3 mL (0.083 %) nebulizer solution; Take 3 mL (2.5 mg)   by nebulization every 4 hours if needed for wheezing. Inhale one vial   every 4-6 hours as needed for cough, wheezing and shortness of breath   Asmanex Twisthaler 220 mcg/ actuation (14) inhaler; Generic drug:   mometasone   baclofen 20 mg tablet; Commonly known as: Lioresal; Take 2 tablets (40   mg) by mouth 3 times a day.   cholecalciferol 50 mcg (2,000 units) capsule; Commonly known as: Vitamin   D-3   diazePAM 5 mg tablet; Commonly known as: Valium; Take 1 tablet (5 mg) by   mouth every 8 hours if needed for anxiety or muscle spasms for up to 5   days.   docusate sodium 250 mg capsule   L. ACIDOPHILUS/BIFID. ANIMALIS ORAL   mirabegron 50 mg tablet extended release 24 hr 24 hr tablet; Commonly   known as: Myrbetriq; Take 1 tablet (50 mg) by mouth once daily.   montelukast 10 mg tablet; Commonly known as: Singulair; Take 1 tablet   (10 mg) by mouth once daily.   omeprazole 40 mg DR capsule; Commonly known as: PriLOSEC; Take 1 capsule   (40 mg) by mouth once daily in the morning. Take before meals. Do not   crush or chew.   oxyBUTYnin XL 15 mg 24 hr tablet; Commonly known as: Ditropan-XL; Take 1   tablet (15 mg) by mouth 2 times a day.   polyethylene glycol 17 gram/dose powder; Commonly known as: Miralax; Mix   1 capful (17 g) of powder with 4 to 8 ounces of beverage and drink once   daily.   Slow Fe 142 mg (45 mg iron) ER tablet; Generic drug: ferrous sulfate ER     STOP  taking these medications     hydroCHLOROthiazide 25 mg tablet; Commonly known as: HYDRODiuril   mupirocin 2 % ointment; Commonly known as: Bactroban       Test Results Pending At Discharge  Pending Labs       Order Current Status    Blood Culture Preliminary result    Blood Culture Preliminary result            Hospital Course  Mr. Jose Fernandez is a 25 y.o. 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 from home with elevated CRP and ESR on outpatient labs. Patient seen and examined with mother and girlfriend at bedside in the emergency department and is a good historian.  Patient was found to have elevated CRP of 67.9 and ESR of 29 on outpatient labs ordered by his pediatric orthopedic surgeon Dr. Riley  while being worked up for worsening spasticity in his legs.  Patient was found to have urinalysis concerning for UTI, and he was subsequently admitted for IV antimicrobial therapy in the setting of varying resistances on microbial growth of previous urine cultures.  Patient has been treated with IV Zosyn and seen by infectious diseases regarding antimicrobial therapy as well as prophylactic therapy on home-going.  Blood cultures negative. Urine culture w/ E. Coli with varying resistance pattern. ID recommended homegoing abx with macrobid for both acute treatment, and for long term suppression. Remained overall stable throughout admission, with some mild hypertension for which he was started on amlodipine 5mg upon discharge as well. His hydrochlorothiazide was held on discharge due to intermittent low potassium during admission. He is to follow up with his PCP regarding BP management, and follow up with urology + ID to discuss long-term UTI suppression.     Pertinent Physical Exam At Time of  Discharge  Physical Exam  Constitutional:       Appearance: Normal appearance.   HENT:      Head: Normocephalic and atraumatic.   Eyes:      Extraocular Movements: Extraocular movements intact.   Cardiovascular:      Rate and Rhythm: Normal rate and regular rhythm.      Heart sounds: Normal heart sounds.   Pulmonary:      Effort: Pulmonary effort is normal. No respiratory distress.      Breath sounds: Normal breath sounds.   Musculoskeletal:         General: Normal range of motion.      Right lower leg: No edema.      Left lower leg: No edema.   Skin:     General: Skin is warm and dry.   Neurological:      General: No focal deficit present.      Mental Status: He is alert. Mental status is at baseline.         Outpatient Follow-Up  Future Appointments   Date Time Provider Department Center   6/18/2025 11:00 AM Troy Chiang DO [id number] [place]   6/23/2025  8:00 AM Griselda Shea APRN-CNP [id number] [place]   9/11/2025  8:15 AM Yoko Riley MD [id number] [place]   10/8/2025  1:00 PM Adam Vargas MD [id number] [place]   2/23/2026 11:30 AM Corry Mejia APRN-CNP [id number] [place]         Ryan Vargas D.O.   Family Medicine PGY-2, [facility]  06/09/25

## 2025-06-09 NOTE — CARE PLAN
Problem: Pain - Adult  Goal: Verbalizes/displays adequate comfort level or baseline comfort level  Outcome: Progressing     Problem: Safety - Adult  Goal: Free from fall injury  Outcome: Progressing     Problem: Discharge Planning  Goal: Discharge to home or other facility with appropriate resources  Outcome: Progressing     Problem: Chronic Conditions and Co-morbidities  Goal: Patient's chronic conditions and co-morbidity symptoms are monitored and maintained or improved  Outcome: Progressing     Problem: Nutrition  Goal: Nutrient intake appropriate for maintaining nutritional needs  Outcome: Progressing     Problem: Fall/Injury  Goal: Not fall by end of shift  Outcome: Progressing  Goal: Be free from injury by end of the shift  Outcome: Progressing  Goal: Verbalize understanding of personal risk factors for fall in the hospital  Outcome: Progressing  Goal: Verbalize understanding of risk factor reduction measures to prevent injury from fall in the home  Outcome: Progressing  Goal: Use assistive devices by end of the shift  Outcome: Progressing  Goal: Pace activities to prevent fatigue by end of the shift  Outcome: Progressing     Problem: Skin  Goal: Decreased wound size/increased tissue granulation at next dressing change  Outcome: Progressing  Flowsheets (Taken 6/9/2025 0511)  Decreased wound size/increased tissue granulation at next dressing change: Promote sleep for wound healing  Goal: Participates in plan/prevention/treatment measures  Outcome: Progressing  Goal: Prevent/manage excess moisture  Outcome: Progressing  Goal: Prevent/minimize sheer/friction injuries  Outcome: Progressing  Goal: Promote/optimize nutrition  Outcome: Progressing  Goal: Promote skin healing  Outcome: Progressing       The clinical goals for the shift include Pt will remain HDS throughout the shift

## 2025-06-09 NOTE — CARE PLAN
The patient's goals for the shift include      The clinical goals for the shift include Pt will remain HDS throughout the shift      Problem: Pain - Adult  Goal: Verbalizes/displays adequate comfort level or baseline comfort level  Outcome: Progressing     Problem: Safety - Adult  Goal: Free from fall injury  Outcome: Progressing     Problem: Discharge Planning  Goal: Discharge to home or other facility with appropriate resources  Outcome: Progressing     Problem: Chronic Conditions and Co-morbidities  Goal: Patient's chronic conditions and co-morbidity symptoms are monitored and maintained or improved  Outcome: Progressing     Problem: Nutrition  Goal: Nutrient intake appropriate for maintaining nutritional needs  Outcome: Progressing     Problem: Fall/Injury  Goal: Not fall by end of shift  Outcome: Progressing  Goal: Be free from injury by end of the shift  Outcome: Progressing  Goal: Verbalize understanding of personal risk factors for fall in the hospital  Outcome: Progressing  Goal: Verbalize understanding of risk factor reduction measures to prevent injury from fall in the home  Outcome: Progressing  Goal: Use assistive devices by end of the shift  Outcome: Progressing  Goal: Pace activities to prevent fatigue by end of the shift  Outcome: Progressing     Problem: Skin  Goal: Decreased wound size/increased tissue granulation at next dressing change  Outcome: Progressing  Goal: Participates in plan/prevention/treatment measures  Outcome: Progressing  Goal: Prevent/manage excess moisture  Outcome: Progressing  Goal: Prevent/minimize sheer/friction injuries  Outcome: Progressing  Goal: Promote/optimize nutrition  Outcome: Progressing  Goal: Promote skin healing  Outcome: Progressing

## 2025-06-09 NOTE — NURSING NOTE
0700: Assumed care of pt    0825: Pt is lying in bed with HOB elevated. Facial symmetry is even and speech is clear. Pt answers questions appropriately and follows simple commands. All AM medications taken as ordered. Pt denies any current pain or discomfort. Family at bedside. Call light and fluids within reach.    1425: Discharge instruction provided to patient and mother. Pt denies any further questions. VSS. IV Removed. Pt transported via wheelchair independently to private vehicle.      06/09/25 1404   Vital Signs   Temp 36.5 °C (97.7 °F)   Temp Source Temporal   Heart Rate 97   Heart Rate Source Monitor   Resp 12   /89   Medical Gas Therapy   SpO2 97 %

## 2025-06-09 NOTE — DISCHARGE INSTRUCTIONS
You were seen in the hospital for UTI .  Please follow up with your PCP in the next 7-14 days regarding hospitalization stay.     You will continue taking antibiotics (Macrobid) long term to aid with prevention of further UTIs. Follow up with your urologist, Dr. Vargas to discuss taking this in the long-term. Additionally, a referral has been placed to infectious disease so you can discuss long-term antibiotics with them as well.     Continue to take amlodipine for your blood pressure. Follow up with your PCP to discuss this.     Please take all of your medications as directed.     New medications:  Amlodipine 5mg   Macrobid       Discontinued medications:  Hydrochlorothiazide      If you have any worsening symptoms such as fever, chills, or concerning symptoms, please call your doctor or return to the hospital.    Thank you for allowing us to participate in your health care.    -[facility].

## 2025-06-10 ENCOUNTER — PATIENT OUTREACH (OUTPATIENT)
Dept: PRIMARY CARE | Facility: CLINIC | Age: 25
End: 2025-06-10
Payer: COMMERCIAL

## 2025-06-10 DIAGNOSIS — J45.30 MILD PERSISTENT ASTHMA WITHOUT COMPLICATION (HHS-HCC): Primary | ICD-10-CM

## 2025-06-10 NOTE — PROGRESS NOTES
Discharge Facility: [facility]  Discharge Diagnosis: cystitis  Admission Date: 6/7/2025  Discharge Date: 6/9/2025    PCP Appointment Date: 6/18/2025  Specialist Appointment Date:   -pulmonary 6/23/2025  -peds ortho 9/11/2025  -urology 10/8/2025  -peds GI 2/23/2026    Hospital Encounter and Summary Linked: Yes  ED to Hosp-Admission (Discharged) with Maria Del Carmen Parr MD (06/06/2025)   See discharge assessment below for further details    Hospital Course  Mr. Jose Fernandez is a 25 y.o. 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 Patient seen and examined with mother and girlfriend at bedside in the emergency department and is a good historian.  Patient was found to have elevated CRP of 67.9 and ESR of 29 on outpatient labs ordered by his pediatric orthopedic surgeon Dr. Riley  while being worked up for worsening spasticity in his legs.  Patient was found to have urinalysis concerning for UTI, and he was subsequently admitted for IV antimicrobial therapy in the setting of varying resistances on microbial growth of previous urine cultures.  Patient has been treated with IV Zosyn and seen by infectious diseases regarding antimicrobial therapy as well as prophylactic therapy on home-going.  Blood cultures negative. Urine culture w/ E. Coli with varying resistance pattern. ID recommended homegoing abx with macrobid for both acute treatment, and for long term suppression. Remained overall stable throughout admission, with some mild hypertension for which he was started on amlodipine 5mg upon discharge as well. His hydrochlorothiazide was held on discharge due to intermittent low potassium during admission. He is to follow up with his PCP regarding BP  management, and follow up with urology + ID to discuss long-term UTI suppression.    Wrap Up  Wrap Up Additional Comments: CTS spoke with patients mother. He was admitted to [facility] on 6/7/2025 with cystitis. Discharged home with no home care needs on 6/9/2025. Patient mother stated that patient was doing well. No urinary issues. Reviewed medications. Patient had no issues with obtaining the new medications norvasc and macrobid. Understands dishcarge instructions. patient is scheduled with a hospital follow up with PCP on 6/18/2025.Patient voiced no questions or concerns at this time. Patient has my contact information for non- emergent issues that may arise. (6/10/2025  2:08 PM)  Call End Time: 1414 (6/10/2025  2:08 PM)    Engagement  Call Start Time: 1408 (6/10/2025  2:08 PM)    Medications  Medications reviewed with patient/caregiver?: Yes (6/10/2025  2:08 PM)  Is the patient having any side effects they believe may be caused by any medication additions or changes?: No (6/10/2025  2:08 PM)  Does the patient have all medications ordered at discharge?: Yes (6/10/2025  2:08 PM)  Care Management Interventions: No intervention needed (6/10/2025  2:08 PM)  Prescription Comments: new: norvasc and macrobid. stop: hctz (6/10/2025  2:08 PM)  Is the patient taking all medications as directed (includes completed medication regime)?: Yes (6/10/2025  2:08 PM)  Medication Comments: see med list (6/10/2025  2:08 PM)    Appointments  Does the patient have a primary care provider?: Yes (6/10/2025  2:08 PM)  Care Management Interventions: Verified appointment date/time/provider (6/10/2025  2:08 PM)  Has the patient kept scheduled appointments due by today?: Yes (6/10/2025  2:08 PM)  Care Management Interventions: Advised patient to keep appointment (6/10/2025  2:08 PM)    Self Management  What is the home health agency?: none (6/10/2025  2:08 PM)  Has home health visited the patient within 72 hours of discharge?: Not applicable  (6/10/2025  2:08 PM)  What Durable Medical Equipment (DME) was ordered?: n/a (6/10/2025  2:08 PM)    Patient Teaching  Does the patient have access to their discharge instructions?: Yes (6/10/2025  2:08 PM)  Care Management Interventions: Reviewed instructions with patient (6/10/2025  2:08 PM)  What is the patient's perception of their health status since discharge?: Improving (6/10/2025  2:08 PM)  Is the patient/caregiver able to teach back the hierarchy of who to call/visit for symptoms/problems? PCP, Specialist, Home Health nurse, Urgent Care, ED, 911: Yes (6/10/2025  2:08 PM)  Patient/Caregiver Education Comments: see wrap up (6/10/2025  2:08 PM)

## 2025-06-11 LAB
BACTERIA BLD CULT: NORMAL
BACTERIA BLD CULT: NORMAL

## 2025-06-16 ENCOUNTER — TELEPHONE (OUTPATIENT)
Dept: ORTHOPEDIC SURGERY | Facility: HOSPITAL | Age: 25
End: 2025-06-16
Payer: COMMERCIAL

## 2025-06-16 DIAGNOSIS — G82.50 QUADRIPLEGIA AND QUADRIPARESIS (MULTI): Primary | ICD-10-CM

## 2025-06-16 DIAGNOSIS — G95.11 SPINAL CORD INFARCTION (MULTI): ICD-10-CM

## 2025-06-16 NOTE — TELEPHONE ENCOUNTER
SYMPTOM PHONE CALL    Name of Patient: Jose Fernandez  Parent or Guardian's Name: Lakesha Fernandez      Reason for Call: Spasm/Twitching Concerns    Additional Information: Mom for Jose called, she said she has been trying to get in touch with Dotty but hasn't heard back. She's really concerned about Jose's muscle spasms and he's been twitching since his pump is not working. She's asking if Dotty can please call her back.    Call Back Number: [phone]

## 2025-06-18 ENCOUNTER — TELEPHONE (OUTPATIENT)
Dept: ORTHOPEDICS | Facility: HOSPITAL | Age: 25
End: 2025-06-18

## 2025-06-18 ENCOUNTER — APPOINTMENT (OUTPATIENT)
Dept: PRIMARY CARE | Facility: CLINIC | Age: 25
End: 2025-06-18
Payer: COMMERCIAL

## 2025-06-18 VITALS
SYSTOLIC BLOOD PRESSURE: 118 MMHG | WEIGHT: 233 LBS | HEART RATE: 89 BPM | DIASTOLIC BLOOD PRESSURE: 76 MMHG | BODY MASS INDEX: 34.51 KG/M2 | HEIGHT: 69 IN | OXYGEN SATURATION: 97 % | RESPIRATION RATE: 16 BRPM

## 2025-06-18 DIAGNOSIS — G95.11 SPINAL CORD INFARCTION (MULTI): ICD-10-CM

## 2025-06-18 DIAGNOSIS — N31.9 NEUROGENIC BLADDER: Primary | ICD-10-CM

## 2025-06-18 DIAGNOSIS — G81.93: ICD-10-CM

## 2025-06-18 DIAGNOSIS — Q65.89 HIP DYSPLASIA (HHS-HCC): ICD-10-CM

## 2025-06-18 DIAGNOSIS — G82.50 QUADRIPLEGIA AND QUADRIPARESIS (MULTI): ICD-10-CM

## 2025-06-18 DIAGNOSIS — I61.9: ICD-10-CM

## 2025-06-18 DIAGNOSIS — G81.93: Primary | ICD-10-CM

## 2025-06-18 DIAGNOSIS — G80.0 SPASTIC QUADRIPLEGIC CEREBRAL PALSY (MULTI): ICD-10-CM

## 2025-06-18 DIAGNOSIS — G82.54: ICD-10-CM

## 2025-06-18 DIAGNOSIS — G95.89 MYELOMALACIA (MULTI): ICD-10-CM

## 2025-06-18 DIAGNOSIS — J45.30 MILD PERSISTENT ASTHMA WITHOUT COMPLICATION (HHS-HCC): ICD-10-CM

## 2025-06-18 DIAGNOSIS — N32.81 DETRUSOR HYPERREFLEXIA: ICD-10-CM

## 2025-06-18 DIAGNOSIS — I61.9: Primary | ICD-10-CM

## 2025-06-18 DIAGNOSIS — E53.8 VITAMIN B 12 DEFICIENCY: ICD-10-CM

## 2025-06-18 DIAGNOSIS — Z93.59 SUPRAPUBIC CATHETER (MULTI): ICD-10-CM

## 2025-06-18 DIAGNOSIS — J98.4 RESTRICTIVE LUNG DISEASE: ICD-10-CM

## 2025-06-18 DIAGNOSIS — N30.90 RECURRENT CYSTITIS: ICD-10-CM

## 2025-06-18 DIAGNOSIS — N39.45 URINARY INCONTINENCE WITH CONTINUOUS LEAKAGE: ICD-10-CM

## 2025-06-18 PROBLEM — G70.9 NEUROMUSCULAR WEAKNESS (MULTI): Status: RESOLVED | Noted: 2023-03-08 | Resolved: 2025-06-18

## 2025-06-18 LAB
ALBUMIN SERPL-MCNC: 3.9 G/DL (ref 3.6–5.1)
ALBUMIN/GLOB SERPL: 1.6 (CALC) (ref 1–2.5)
ALP SERPL-CCNC: 84 U/L (ref 36–130)
ALT SERPL-CCNC: 25 U/L (ref 9–46)
AST SERPL-CCNC: 21 U/L (ref 10–40)
BILIRUB DIRECT SERPL-MCNC: 0.2 MG/DL
BILIRUB INDIRECT SERPL-MCNC: 0.6 MG/DL (CALC) (ref 0.2–1.2)
BILIRUB SERPL-MCNC: 0.8 MG/DL (ref 0.2–1.2)
GLOBULIN SER CALC-MCNC: 2.5 G/DL (CALC) (ref 1.9–3.7)
PROT SERPL-MCNC: 6.4 G/DL (ref 6.1–8.1)

## 2025-06-18 PROCEDURE — 1036F TOBACCO NON-USER: CPT | Performed by: INTERNAL MEDICINE

## 2025-06-18 PROCEDURE — 99496 TRANSJ CARE MGMT HIGH F2F 7D: CPT | Performed by: INTERNAL MEDICINE

## 2025-06-18 PROCEDURE — 3008F BODY MASS INDEX DOCD: CPT | Performed by: INTERNAL MEDICINE

## 2025-06-18 RX ORDER — DANTROLENE SODIUM 25 MG/1
25 CAPSULE ORAL DAILY
Qty: 7 CAPSULE | Refills: 0 | Status: SHIPPED | OUTPATIENT
Start: 2025-06-18 | End: 2025-06-25

## 2025-06-18 ASSESSMENT — ENCOUNTER SYMPTOMS
SORE THROAT: 0
CHILLS: 0
SHORTNESS OF BREATH: 0
TROUBLE SWALLOWING: 0
ABDOMINAL PAIN: 0
PALPITATIONS: 0
FEVER: 0

## 2025-06-18 ASSESSMENT — PAIN SCALES - GENERAL: PAINLEVEL_OUTOF10: 0-NO PAIN

## 2025-06-18 NOTE — ASSESSMENT & PLAN NOTE
Orders:    Referral to Infectious Disease; Future    CBC and Auto Differential; Future    Comprehensive metabolic panel; Future    C-Reactive Protein; Future    Sedimentation Rate; Future

## 2025-06-18 NOTE — PROGRESS NOTES
"Patient: Jose Fernandez  : 2000  PCP: Troy Chiang DO  MRN: [id number]  Program: Transitional Care Management  Status: Enrolled  Effective Dates: 6/10/2025 - present  Responsible Staff: Vanessa Smith CMA  Social Drivers to be Addressed: No information to display         Jose Fernandez is a 25 y.o. male presenting today for follow-up after being discharged from the hospital {Numbers; 1-14:91975} days ago. The main problem requiring admission was ***. The discharge summary and/or Transitional Care Management documentation was reviewed. Medication reconciliation was performed as indicated via the \"Audi as Reviewed\" timestamp.     Jose Fernandez was contacted by Transitional Care Management services two days after his discharge. This encounter and supporting documentation was reviewed.    Review of Systems    /76   Pulse 89   Resp 16   Ht 1.753 m (5' 9\")   Wt 106 kg (233 lb)   SpO2 97%   BMI 34.41 kg/m²     Physical Exam    The complexity of medical decision making for this patient's transitional care is {DESC; MODERATE/HIGH:54354}.    Assessment/Plan   {Assess/PlanSmartLinks:70789}    "

## 2025-06-18 NOTE — PROGRESS NOTES
Subjective   Jose Fernandez is a 25 y.o. male who presents for Hospital Follow-up (6.6.2025-6.9-2025 for cystitis/Had labs and CT completed while in the ER /Has not completed the vascular US done due to getting sent to the ER, m/TCM completed on 6.10.2025).      History of Present Illness  The patient presents for a follow-up visit after a recent hospitalization.    Severe Urinary Tract Infection (UTI)  He was hospitalized for 4 days due to a severe urinary tract infection (UTI) that required intravenous treatment. During his hospital stay, he underwent two urine cultures, one in the emergency room and another upon admission to his room. He has been on Macrobid for a month and is currently seeking an appointment with an infectious disease specialist, which has proven challenging. His urologist has suggested a prophylactic antibiotic regimen for recurrent UTIs.  - Onset: Hospitalized for 4 days due to severe UTI.  - Duration: Has been on Macrobid for a month.  - Character: Severe UTI requiring intravenous treatment.  - Alleviating/Aggravating Factors: Seeking appointment with infectious disease specialist; prophylactic antibiotic regimen suggested.  - Timing: Hospital stay included two urine cultures.    Unstable Blood Pressure and Heart Rate  He was started on amlodipine during his hospital stay. His blood pressure has been unstable recently, with occasional spikes during muscle spasms. He monitors his blood pressure at home twice daily, which has remained relatively low, except for the aforementioned spikes. His heart rate also increases slightly during these episodes.  - Onset: Started on amlodipine during hospital stay.  - Character: Unstable blood pressure with occasional spikes during muscle spasms; heart rate increases slightly during episodes.  - Timing: Monitors blood pressure at home twice daily.    Muscle Spasms and Baclofen Pump Issues  He had an appointment with his surgeon for the baclofen pump, which  was replaced in 03/2025. They did not replace the catheter that goes to the spine because it is more involved. They think something is wrong with the catheter, so they had the surgery scheduled for 06/20/2025, but it was canceled due to the infection. His muscle spasms have been out of control. He is taking baclofen 40 mg three times a day now. It is getting a little better since he is on an antibiotic for a month. He needs to make an appointment with infectious disease, which is almost impossible to find an infectious disease doctor that will see him. The [facility] ones are not taking new patients unless it is an emergency. The doctor that he saw at [facility], Dr. Ware, only sees patients at [facility] who he saw at [facility]. He is only an in-hospital doctor. He will not see patients outside the hospital. He did get another phone number this morning because [facility] called him to see how he was doing. They are thinking he should stay on some kind of preventative antibiotic for these UTIs. His urologist has stated he will follow the recommendations of the infectious disease specialist. Muscle spasms are the main issue. As soon as they can get this catheter placed, they know he will be able to sleep better as his legs currently keep him up. He did just have blood work done yesterday to check his liver enzymes because they want to switch from baclofen to something else a little stronger that obviously affects the liver. They use it a lot for high spasticity with cerebral palsy patients. He did get those results back this morning. Rosemary wanted to order more labs to check for infection next week. He has an indwelling suprapubic catheter and has been prescribed dantrolene 25 mg daily.  - Onset: Baclofen pump replaced in 03/2025; surgery for catheter placement scheduled for 06/20/2025 but canceled due to infection.  - Location: Muscle spasms affecting legs.  - Duration: Muscle spasms have been out of control;  "getting a little better since on antibiotic for a month.  - Character: Muscle spasms; issues with baclofen pump and catheter.  - Alleviating/Aggravating Factors: Taking baclofen 40 mg three times a day; prescribed dantrolene 25 mg daily; needs catheter placement surgery.  - Timing: Muscle spasms are the main issue; blood work done yesterday to check liver enzymes.  - Severity: Muscle spasms keep him up at night; needs preventative antibiotic for UTIs.    Weight Loss Considerations  He is considering Ozempic for weight loss, even though he does not have diabetes. He acknowledges excessive fast-food consumption and plans to reduce this. His physical activity has been limited due to muscle spasms.  - Character: Considering Ozempic for weight loss; excessive fast-food consumption.  - Alleviating/Aggravating Factors: Plans to reduce fast-food consumption.  - Timing: Physical activity limited due to muscle spasms.    PAST SURGICAL HISTORY:  - Baclofen pump replacement in 03/2025  -      Review of Systems   Constitutional:  Negative for chills and fever.   HENT:  Negative for sore throat and trouble swallowing.    Respiratory:  Negative for shortness of breath.    Cardiovascular:  Negative for chest pain, palpitations and leg swelling.   Gastrointestinal:  Negative for abdominal pain.   Skin:  Negative for rash.       Objective   /76   Pulse 89   Resp 16   Ht 1.753 m (5' 9\")   Wt 106 kg (233 lb)   SpO2 97%   BMI 34.41 kg/m²       Physical Exam  Constitutional:       Appearance: Normal appearance.   HENT:      Head: Normocephalic.   Eyes:      Conjunctiva/sclera: Conjunctivae normal.   Cardiovascular:      Rate and Rhythm: Normal rate and regular rhythm.      Heart sounds: Normal heart sounds.   Pulmonary:      Effort: Pulmonary effort is normal.      Breath sounds: Normal breath sounds.   Musculoskeletal:      Cervical back: Neck supple.   Skin:     General: Skin is warm and dry.   Neurological:      Mental " Status: He is alert.         Assessment & Plan  Neurogenic bladder    Orders:    CBC and Auto Differential; Future    Comprehensive metabolic panel; Future    C-Reactive Protein; Future    Sedimentation Rate; Future    C5-C7 incomplete quadriplegia (Multi)         Birth trauma with spinal injury (Multi)    Orders:    Referral to Infectious Disease; Future    Urinary incontinence with continuous leakage    Orders:    Referral to Infectious Disease; Future    CBC and Auto Differential; Future    Comprehensive metabolic panel; Future    C-Reactive Protein; Future    Sedimentation Rate; Future    Detrusor hyperreflexia         Recurrent cystitis    Orders:    Referral to Infectious Disease; Future    CBC and Auto Differential; Future    Comprehensive metabolic panel; Future    C-Reactive Protein; Future    Sedimentation Rate; Future    Suprapubic catheter (Multi)    Orders:    Referral to Infectious Disease; Future    Vitamin B 12 deficiency    Orders:    C-Reactive Protein; Future    Sedimentation Rate; Future    Spastic quadriplegic cerebral palsy (Multi)         Hip dysplasia (HHS-HCC)         Mild persistent asthma without complication (HHS-HCC)         Restrictive lung disease         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 Urinary Tract Infection (UTI): Acute.  - Hospitalized for 4 days due to severe UTI requiring IV antibiotics.  - Currently on Macrobid for a month as a preventative measure.  - Referral to Dr. Alarcon, Chair of the Department of Medicine, and Nehal Burch initiated for further management.  - No new urine cultures ordered while on antibiotics.    2. Hypertension.  - Blood pressure readings consistently within normal range, except during muscle spasms and infections.  - Plan to discontinue amlodipine.  - Blood pressure monitored twice daily at home.    3. Muscle Spasms: Chronic.  - Severe  muscle spasms somewhat controlled with increased oral baclofen (40 mg 3 times a day).  - Dantrolene 25 mg daily prescribed by orthopedic surgeon, with plans to increase dose if well-tolerated.  - Blood work done to check liver enzymes before starting dantrolene.  - Muscle spasms affecting sleep and daily activities.    4. Weight Management.  - Expressed interest in weight loss and discussed potential use of Ozempic.  - GLP-1 medications deferred due to increased risk of UTIs.  - Advised to eliminate fast food from diet and incorporate more fruits and vegetables.  - Clinical pharmacist to assess qualification for weight loss medications once current health issues are resolved.    Follow-up  - Follow-up with infectious disease specialist.  - Follow-up with orthopedic surgeon for muscle spasm management.      Troy Chiang, DO   This medical note was created with the assistance of artificial intelligence (AI) for documentation purposes. The content has been reviewed and confirmed by the healthcare provider for accuracy and completeness. Patient consented to the use of audio recording and use of AI during their visit.

## 2025-06-18 NOTE — PROGRESS NOTES
"Patient: Jose Fernandez  : 2000  PCP: Troy Chiang DO  MRN: [id number]  Program: Transitional Care Management  Status: Enrolled  Effective Dates: 6/10/2025 - present  Responsible Staff: Vanessa Smith CMA  Social Drivers to be Addressed: No information to display         Jose Fernandez is a 25 y.o. male presenting today for follow-up after being discharged from the hospital 9 days ago. The main problem requiring admission was UTI. The discharge summary and/or Transitional Care Management documentation was reviewed. Medication reconciliation was performed as indicated via the \"Audi as Reviewed\" timestamp.     Jose Fernandez was contacted by Transitional Care Management services two days after his discharge. This encounter and supporting documentation was reviewed.    Review of Systems    /76   Pulse 89   Resp 16   Ht 1.753 m (5' 9\")   Wt 106 kg (233 lb)   SpO2 97%   BMI 34.41 kg/m²     Physical Exam    The complexity of medical decision making for this patient's transitional care is {DESC; MODERATE/HIGH:28402}.    Assessment/Plan   {Assess/PlanSmartLinks:67865}    "

## 2025-06-18 NOTE — ASSESSMENT & PLAN NOTE
Orders:    CBC and Auto Differential; Future    Comprehensive metabolic panel; Future    C-Reactive Protein; Future    Sedimentation Rate; Future

## 2025-06-23 ENCOUNTER — APPOINTMENT (OUTPATIENT)
Facility: CLINIC | Age: 25
End: 2025-06-23
Payer: COMMERCIAL

## 2025-06-23 DIAGNOSIS — G81.93: ICD-10-CM

## 2025-06-23 DIAGNOSIS — G95.11 SPINAL CORD INFARCTION (MULTI): ICD-10-CM

## 2025-06-23 DIAGNOSIS — I61.9: ICD-10-CM

## 2025-06-23 DIAGNOSIS — G82.50 QUADRIPLEGIA AND QUADRIPARESIS (MULTI): ICD-10-CM

## 2025-06-23 RX ORDER — DANTROLENE SODIUM 25 MG/1
50 CAPSULE ORAL 3 TIMES DAILY
Qty: 180 CAPSULE | Refills: 11 | Status: SHIPPED | OUTPATIENT
Start: 2025-06-23 | End: 2025-06-23 | Stop reason: SDUPTHER

## 2025-06-23 RX ORDER — DANTROLENE SODIUM 25 MG/1
50 CAPSULE ORAL 3 TIMES DAILY
Qty: 180 CAPSULE | Refills: 11 | Status: SHIPPED | OUTPATIENT
Start: 2025-06-23 | End: 2025-07-23

## 2025-06-25 ENCOUNTER — APPOINTMENT (OUTPATIENT)
Facility: CLINIC | Age: 25
End: 2025-06-25
Payer: COMMERCIAL

## 2025-06-25 ENCOUNTER — PATIENT OUTREACH (OUTPATIENT)
Dept: PRIMARY CARE | Facility: CLINIC | Age: 25
End: 2025-06-25

## 2025-06-25 VITALS
HEIGHT: 69 IN | DIASTOLIC BLOOD PRESSURE: 85 MMHG | WEIGHT: 233 LBS | HEART RATE: 67 BPM | RESPIRATION RATE: 16 BRPM | BODY MASS INDEX: 34.51 KG/M2 | SYSTOLIC BLOOD PRESSURE: 129 MMHG

## 2025-06-25 DIAGNOSIS — N39.45 URINARY INCONTINENCE WITH CONTINUOUS LEAKAGE: ICD-10-CM

## 2025-06-25 DIAGNOSIS — N30.90 RECURRENT CYSTITIS: Primary | ICD-10-CM

## 2025-06-25 DIAGNOSIS — Z93.59 SUPRAPUBIC CATHETER (MULTI): ICD-10-CM

## 2025-06-25 PROCEDURE — 3008F BODY MASS INDEX DOCD: CPT | Performed by: STUDENT IN AN ORGANIZED HEALTH CARE EDUCATION/TRAINING PROGRAM

## 2025-06-25 PROCEDURE — 99215 OFFICE O/P EST HI 40 MIN: CPT | Performed by: STUDENT IN AN ORGANIZED HEALTH CARE EDUCATION/TRAINING PROGRAM

## 2025-06-25 RX ORDER — NITROFURANTOIN 25; 75 MG/1; MG/1
100 CAPSULE ORAL DAILY
Qty: 30 CAPSULE | Refills: 3 | Status: SHIPPED | OUTPATIENT
Start: 2025-06-25 | End: 2025-10-23

## 2025-06-25 NOTE — LETTER
06/26/25    Troy Chiang DO  [address]  [address]  [facility] [address]      Dear Dr. Troy Chiang DO,    I am writing to confirm that your patient, Jose Fernandez, received care in my office on 06/26/25. I have enclosed a summary of the care provided to Jose for your reference.    Please contact me with any questions you may have regarding the visit.    Sincerely,         Nehal Ramires DO  [address]  [address]  [facility] [address]  [phone]    CC: No Recipients

## 2025-06-25 NOTE — PROGRESS NOTES
Confirmation of at least 2 patient identifiers.    Completed telephonic follow-up with patient after recent visit with Dr Chiang    Spoke to relative mother Lakesha during outreach call.    Patient reports feeling: Improved    Patient has questions or concerns about medications: No    Have all prescribed medications been filled? Yes    Patient has necessary resources to manage their care? Yes    Patient has questions or concerns? No    Next care management follow-up approximately within one month.  Care  information provided to patient.       
81.6

## 2025-06-25 NOTE — LETTER
06/26/25    Troy Chiang DO  [address]  [address]  [facility] [address]      Dear Dr. Troy Chiang DO,    Thank you for referring your patient, Jose Fernandez, to receive care in my office. I have enclosed a summary of the care provided to Jose on 06/26/25.    Please contact me with any questions you may have regarding the visit.    Sincerely,         Nehal Ramires DO  [address]   [address]  [facility] [id number] [phone]    CC: No Recipients

## 2025-06-25 NOTE — PROGRESS NOTES
Infectious Diseases Clinic Consult Note:    Referred by Troy Chiang DO  Primary MD: Troy Chiang DO  Reason for Consult: recurrent UTI    History of Current Issue  Jose Fernandez is a 25 y.o. 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 difficile infection, s/p Nissen fundoplication, who presents to ID clinic for recurrent cystitis. Accompanied by his mother today and girlfriend today.     Urologic history: s/p cysto, bladder botox and exp lap with Dr. aVrgas, unsuccesful per notes due to adhesions (10/20/23), as well as bladder botox in June and Dec 2024. Currently on oxybutynin and mirabegron. Per notes had undergone a Mitrofanoff appendicovesicostomy which was complicated by abdominal abscess, now resolved. Straight caths 4-5x/day, no issues w/ this. Last appt w/ Urology 5/7/25. Not sure he's going to do Botox again; has noticed increased UTIs since.     Recent admission to [facility]'s 6/6-6/9/25 for dark and malodorous urine as well as bladder spasms. On admission afebrile, hypertensive, WBC 10k, Cr 0.5. UA with >50 WBCs, 11-20 RBCs, started on pip-tazo. Elev CRP 4.5 (6.7 at outside clinic labs on 6/5), ESR 25.  He reports prior to this hospitalization he was experiencing increased spasticity, low urinary output, malodor of the urine, more constipation (on a bowel regimen); spasms were whole body and made him feel warm, no objective fever, temps 98-99F per mom, although she reports this is elevated for him.  New hypertension, tachycardia.  Once admitted and started on IV abx, this did help urinary symptoms and constipation but not his spasms from what he tells me today.  He was seen by infectious disease inpatient, urine culture ultimately grew >100k E. Coli. Had CT  chest/abd/pelvis and spinal imaging, most notable for possible cystitis. Discharged on nitrofurantoin 100mg BID x 30 days.  He has been taking this as prescribed, and reports he has felt the best that he has in some time.  The other symptoms that he gets with cystitis include urinary leakage from his Mitrofanoff stoma site, and had to wear Depends, no leakage now. Worst feeling per him is constant bladder pressure, feels like he has to cath every 30-60 mins w/ no urine output; still has sensation to void and have BMs. Tolerating nitrofurantoin, makes him thirsty and gets dysgeusia, but otherwise tolerating. When seen by Peds he reports he was on prophylactic abx w/ cephalexin once daily in the past and this seemed to work well.     Uses clean technique with CIC, wears gloves, although he has no functional  in his R hand, and is not able to open the antibacterial wipes that come with the CIC materials. Does not reuse caths. Another challenge is sometimes he is not in a location which has a clean/available/functional space for him with his wheelchair, and therefore he has to cath in less than ideal locations.     Currently on oral baclofen for his spasms, trialed diazepam w/o relief. Now starting dantrolene for the past week and does feel like spasms are better. Pump replaced in March, catheter needs replaced but this was delayed due to his infection. Tentative plan for baclofen pump exchange 8/12 or 8/13 per pt.       PAST MEDICAL HISTORY:  Medical History[1]    PAST SURGICAL HISTORY:  Surgical History[2]  And baclofen pump      ALLERGIES:    Allergies[3]  -Confirmed cipro and TMP-SMX were rash and anaphylaxis, had trach at the time   -Had been densensitized to cipro in the past (in his immunologist's outpt office) and was able to take it   -Vancomycin - red skin, flushing   -Diarrhea w/ amox-clav, ceftriaxone     MEDICATIONS:    Current Medications[4]    FAMILY HISTORY:   Family History[5]     SOCIAL HISTORY:        Marital Status: lives w/ family with parents   Tobacco / Smokeless tobacco/ Vaping:   reports that he has never smoked. He has never been exposed to tobacco smoke. 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, etc   Travel: none   Pets:  none currently     Service:  none   Hobbies:  active w/ sports, does [employer], played soccer before       IMMUNIZATIONS:    Immunization History   Administered Date(s) Administered    DTaP vaccine, pediatric  (INFANRIX) 2002, 2002, 2002, 06/06/2003, 09/28/2007    Flu vaccine (IIV4), preservative free *Check age/dose* 11/26/2014, 11/03/2017, 09/30/2020    Flu vaccine, trivalent, preservative free, age 6 months and greater (Fluarix/Fluzone/Flulaval) 08/28/2012    HPV 9-valent vaccine (GARDASIL 9) 05/22/2024, 06/25/2024, 11/26/2024    Hep A, Unspecified 07/16/2010, 08/05/2011    Hep B, Unspecified 2002, 2002, 03/14/2003    HiB, unspecified 2002, 2002, 2002, 06/06/2003    Influenza, injectable, quadrivalent 03/04/2022    Influenza, seasonal, injectable 10/17/2008, 10/02/2009, 10/01/2022, 10/13/2022    MMR vaccine, subcutaneous (MMR II) 03/14/2003, 09/28/2007    Meningococcal ACWY vaccine (MENVEO) 09/30/2020    Meningococcal ACWY-D (Menactra) 4-valent conjugate vaccine 11/26/2014    Meningococcal B, Unspecified 09/30/2020    Meningococcal MPSV4 11/26/2014    Moderna SARS-CoV-2 Vaccination 02/13/2021, 03/13/2021, 05/01/2021    Novel influenza-H1N1-09, preservative-free 11/24/2009, 01/15/2010    Pneumococcal conjugate vaccine, 13-valent (PREVNAR 13) 2002,  2002, 03/14/2003, 06/06/2003, 10/17/2008    Pneumococcal polysaccharide vaccine, 23-valent, age 2 years and older (PNEUMOVAX 23) 03/04/2022    Polio, Unspecified 2002, 2002, 2002, 09/28/2007    Tdap vaccine, age 7 year and older (BOOSTRIX, ADACEL) 06/26/2014, 09/30/2020    Varicella vaccine, subcutaneous (VARIVAX) 03/14/2003, 09/28/2007       REVIEW OF SYSTEMS:  Review of Systems - as above plus:  Appetite ok no resp symptoms   No N/V, no reflux  No diarrhea   No HA, vision changes   No joint aches, muscle aches, no rash   No skin issues, no chronic wounds or ulcers     PHYSICAL EXAMINATION:       Visit Vitals  Smoking Status Never     Vitals:    06/25/25 0828   BP: 129/85   Pulse: 67   Resp: 16          EXAM:     Comfortable appearing, examined in wheelchair, NAD, non-toxic appearing   Lungs CTA anteriorly, no wheezes or rhonchi  Regular rate rhythm, S1/S2, no appreciable murmurs  Abdomen soft, nontender appearing, bowel sounds quiet, stoma site without erythema or drainage, no urinary leakage; well-healed midline abdominal scar  Lower extremities warm to touch, no peripheral edema  Intermittent chest and upper extremity spasms on exam  Sensation intact to light touch in bilateral upper extremities and to mid chest  Not able to form a fist on the right side,  strength intact on left side  Alert, answers questions appropriately    DATA:    Outside system data reviewed:     Laboratory Values and Test Results:     Microbiology:     Susceptibility data from last 90 days.  Collected Specimen Info Organism Amikacin Amoxicillin/Clavulanate Ampicillin Ampicillin/Sulbactam Cefazolin Cefazolin (uncomplicated UTIs only) Ciprofloxacin Gentamicin Levofloxacin Nitrofurantoin Piperacillin/Tazobactam   06/06/25 Urine from Straight Catheter Escherichia coli  S  R  R  R  S  S  S  R  S  S  S     Collected Specimen Info Organism Tobramycin Trimethoprim/Sulfamethoxazole   06/06/25 Urine from Straight  Catheter Escherichia coli  I  R       6/7 urine cx NG  6/6 blood cx NG  4/30 urine cx >100k Klebsiella pneumoniae   2/13 10-49k Klebsiella pneumoniae   12/12/24 >100k Klebsiella pneumoniae   10/9/24 >100k Klebsiella pneumoniae   8/28/24 >100k Klebsiella pneumoniae   7/10/24 >100k Citrobacter freundii, 20-80k Pseudomonas   7/3/24 >100k Pseudomonas       Other Relevant Studies:        Imaging Studies:      6/6 CT chest/abd/pelvis: possible urothelial hyperemia and fat stranding, possible acute cystitis. No abd fluid collections     ASSESSMENT / RECOMMENDATIONS:    Jose Fernandez is a 25 y.o. 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  As of a few years ago, he has transitioned from his pediatric providers to adult specialists, including urology, and now infectious disease.  He reports frequent UTIs, in the past seem to be primarily Klebsiella pneumoniae, but does have a history of Pseudomonas and Citrobacter infection as of July 2024.  His most recent urine culture during his hospitalization in June was a fairly susceptible E. coli.  At discharge, he was prescribed nitrofurantoin 100 mg twice daily for 30 days.  He reports many of his symptoms of UTI dramatically improved while inpatient, including urinary leakage, bladder pressure sensation, decreased urinary output and constipation.  He did not notice any difference in his muscle spasms, although we discussed that it is reasonable to rule out infection when these acutely worsen.  He is currently being managed for replacement of his baclofen pump catheter, although his team would like his urinary tract infections to be under control prior to that date.  We discussed it would be  reasonable for him to complete his prescribed course of therapeutic dosing of nitrofurantoin, after which we could transition to 100 mg daily dosing as prophylaxis.  He and his family report he had been on antimicrobial prophylaxis for recurrent UTI in the past with good results.  We discussed the risks and benefits of this, however in the setting of his normal renal function and young age, I think nitrofurantoin antimicrobial prophylaxis could be a good option for him.    We did discuss rechecking his inflammatory markers today, however he is clinically doing very well, therefore I would not change his current management; his prior elevation seems consistent with his acute infection, and no suspicious findings of osteomyelitis or discitis on his spinal CT imaging.      #SCI since birth with C5-C7 quadriplegia  #Neurogenic bladder status post Mitrofanoff procedure, undergoing CIC  #Recurrent cystitis      Recommendations:  - Patient will continue his current prescribed regimen of p.o. nitrofurantoin to complete 30 days  - After completing his therapeutic dosing of nitrofurantoin, he will start p.o. nitrofurantoin 100 mg daily as antimicrobial prophylaxis, Rx sent  - Monitor for any new or worsening symptoms of UTI      Follow-up in ID in 3 months    Nehal Ramires DO                             [1]   Past Medical History:  Diagnosis Date    Abnormal results of pulmonary function studies 03/21/2019    Abnormal PFT    Acute infarction of spinal cord (embolic) (nonembolic) (Multi) 04/30/2020    intrauterine hemorrhage and subsequent cord infarction    Asthma     last seen by Lyric Cameron 05/11/2023    GERD (gastroesophageal reflux disease)     s/p Nissen    Hemiplegia (Multi)     History of transfusion     Immunocompromised     Intra-abdominal abscess (Multi)     recurrent    Mitrofanoff appendicovesicostomy present (Multi)     MSSA (methicillin susceptible Staphylococcus aureus)     MSSA R gluteal abscess 03/2023     Myelomalacia (Multi)     Neurogenic bladder     Neurogenic bowel     Neuromuscular weakness (Multi) 03/08/2023    Other conditions influencing health status 07/21/2013    Drug-induced Myelopathy    Other conditions influencing health status 03/04/2022    Abscess, abdomen    Pectus excavatum 07/21/2013    Pectus excavatum    Peritoneal abscess (Multi) 05/24/2021    Peritonitis with abscess of intestine    Peritonitis     Personal history of diseases of the skin and subcutaneous tissue 05/17/2019    History of dermatitis    Personal history of diseases of the skin and subcutaneous tissue     History of eczema    Personal history of other diseases of the digestive system 04/13/2020    History of chronic constipation    Personal history of other infectious and parasitic diseases 07/24/2019    History of onychomycosis    Personal history of other infectious and parasitic diseases 09/24/2018    History of wound infection    Reactive airway disease ([facility])     Recurrent UTI (urinary tract infection)     s/p Mitrofanoff    Respiratory insufficiency     BiPap supplementation at night-has not used in 10 years    Restrictive lung disease     Scoliosis, unspecified     Kyphoscoliosis    Snoring     Snoring    Spinal cord infarction (Multi) 06/27/2024    Tinea pedis 07/23/2019    Tinea pedis of both feet    Urinary tract infection     Recurrent UTI's S/p mitrofanoff with Dr. Myles.    UTI (urinary tract infection)     current    Xerosis cutis 07/23/2019    Xerosis of skin   [2]   Past Surgical History:  Procedure Laterality Date    ACHILLES TENDON SURGERY  10/30/2013    Subcutaneous Tenotomy Achilles Tendon Under Gen Anesthesia    BACLOFEN PUMP IMPLANTATION  2019    BRONCHOSCOPY      CYSTOSCOPY  12/12/2024    GASTROSTOMY  10/30/2013    Gastric Surgery Feeding Gastrostomy s/p removal    MITROFANOFF PROCEDURE  08/06/2018    NISSEN FUNDOPLICATION      2003    OTHER SURGICAL HISTORY  10/30/2013    Direct Laryngoscopy    OTHER  SURGICAL HISTORY  10/30/2013    Closed Treatment Fx Of Proximal Femoral Neck W/ Manipulation    OTHER SURGICAL HISTORY  04/16/2015    Complex Repair Of Wound Trunk 2.6 To 7.5 Cm    OTHER SURGICAL HISTORY  10/14/2022    Circumcision    SPINAL FUSION      x2 2015, 2017    TRACHEOSTOMY TUBE PLACEMENT      s/p decannulation 2005    US GUIDED PERCUTANEOUS PERITONEAL OR RETROPERITONEAL FLUID COLLECTION DRAINAGE  04/08/2021    US GUIDED PERCUTANEOUS PERITONEAL OR RETROPERITONEAL FLUID COLLECTION DRAINAGE 4/8/2021 [facility]   [3]   Allergies  Allergen Reactions    Ciprofloxacin Anaphylaxis     Has taken as an adult, anaphylaxis as an infant    Sulfamethoxazole-Trimethoprim Anaphylaxis    Sutures Other     Patients mother states patient reacts to dissolveable suture     Fish Containing Products Hives    Shellfish Containing Products Hives    Vancomycin Other     Red Man's syndrome    [4]   Current Outpatient Medications:     albuterol 2.5 mg /3 mL (0.083 %) nebulizer solution, Take 3 mL (2.5 mg) by nebulization every 4 hours if needed for wheezing. Inhale one vial every 4-6 hours as needed for cough, wheezing and shortness of breath, Disp: 75 mL, Rfl: 3    baclofen (Lioresal) 20 mg tablet, Take 2 tablets (40 mg) by mouth 3 times a day., Disp: 180 tablet, Rfl: 11    cholecalciferol (Vitamin D-3) 50 mcg (2,000 unit) capsule, Take 1 capsule (50 mcg) by mouth early in the morning.., Disp: , Rfl:     dantrolene (Dantrium) 25 mg capsule, Take 2 capsules (50 mg) by mouth 3 times a day., Disp: 180 capsule, Rfl: 11    diazePAM (Valium) 5 mg tablet, Take 1 tablet (5 mg) by mouth every 8 hours if needed for anxiety or muscle spasms for up to 5 days. (Patient not taking: Reported on 6/7/2025), Disp: 15 tablet, Rfl: 0    docusate sodium 250 mg capsule, Take 1 capsule (250 mg) by mouth once daily., Disp: , Rfl:     ferrous sulfate ER (Slow Fe) 142 mg ER tablet, Take 1 tablet by mouth once daily., Disp: , Rfl:     L.  ACIDOPHILUS/BIFID. ANIMALIS ORAL, Take 1 capsule by mouth once daily., Disp: , Rfl:     mirabegron (Myrbetriq) 50 mg tablet extended release 24 hr 24 hr tablet, Take 1 tablet (50 mg) by mouth once daily., Disp: 30 tablet, Rfl: 11    mometasone (Asmanex Twisthaler) 220 mcg/ actuation (14) inhaler, Inhale 2 puffs once daily as needed (for shortness of breath). Rinse mouth with water after use to reduce aftertaste and incidence of candidiasis. Do not swallow., Disp: , Rfl:     montelukast (Singulair) 10 mg tablet, Take 1 tablet (10 mg) by mouth once daily., Disp: 30 tablet, Rfl: 11    nitrofurantoin, macrocrystal-monohydrate, (Macrobid) 100 mg capsule, Take 1 capsule (100 mg) by mouth 2 times a day., Disp: 60 capsule, Rfl: 1    omeprazole (PriLOSEC) 40 mg DR capsule, Take 1 capsule (40 mg) by mouth once daily in the morning. Take before meals. Do not crush or chew., Disp: 30 capsule, Rfl: 11    oxyBUTYnin XL (Ditropan-XL) 15 mg 24 hr tablet, Take 1 tablet (15 mg) by mouth 2 times a day., Disp: 60 tablet, Rfl: 11    polyethylene glycol (Miralax) 17 gram/dose powder, Mix 1 capful (17 g) of powder with 4 to 8 ounces of beverage and drink once daily., Disp: 1700 g, Rfl: 0    Current Facility-Administered Medications:     baclofen (Lioresal) 2,000 mcg/mL intrathecal injection, 300 mcg/day, intrathecal, Continuous, Yoko Riley MD, Last Rate: 0.006 mL/hr at 11/21/24 0942, 300 mcg/day at 11/21/24 0942    baclofen (Lioresal) intrathecal injection, 100 mcg/day, intrathecal, Continuous, Yoko Riley MD, Last Rate: 0.002 mL/hr at 01/11/24 1101, 100 mcg/day at 01/11/24 1101  [5]   Family History  Problem Relation Name Age of Onset    Diabetes Maternal Grandfather

## 2025-07-14 NOTE — PROGRESS NOTES
Patient: Jose Fernandez    [id number]  : 2000 -- AGE 25 y.o.    Provider: Griselda Shea     Location [facility]   Service Date: 2025            [facility]  Follow-up Visit Note      HISTORY OF PRESENT ILLNESS     The patient's referring provider is: No ref. provider found Lyric Cameron from [place]     HISTORY OF PRESENT ILLNESS   Jose Fernandez is a 25 y.o. male who presents to a [facility] for an evaluation with concerns of reactive airway disease. I have independently interviewed and examined the patient in the office and reviewed available records.     I personally reviewed the note from Dr. Cameron notes from date May 2023 . This is contributing to my history, assessment, and plan:  This is a Dr. Cameron notes - this is a 22 yo with complicated medical history including underlying myelomalacia with hemiparesis, muscular weakness, restrictive lung disease, reactive airway disease/asthma, and history of respiratory insufficiency with BiPap supplementation at night, when he is ill - none in MANY years. Also remote history of tracheostomy; he was decannulated in 2005     He has hx peritonitis with associated pleural effusions. FVC is lower %62. FEV1 is stable around 70%. impaired airway clearance - would recommend something to use when sick - continue acapella PRN.        Current History    On today's visit, the patient reports as a child had a trach and ventilator as a child, he got removed about 4-5 years. He uses the nebs every 3 hours when he is sick. Denies SOB at rest. He is quadriplegic .    Denies orthopnea - he has baclofen pump as he can have spasticity, pnd, or matti.  Weight has been mostly stable.  Denies  chronic cough, denies wheezing, and denies clear, green, blood streaks. No night cough. No hemoptysis. No fever or shivering chills. Has no runny nose, or a tingling sensation in the back of  his throat. Also complains of heartburn - has pepcid twice a day. Denies chest pain or heartburn.     Previous pulmonary history: Has restrictive/reactive airway.     Inhalers/nebulized medications: symbicort - only using prn, albuterol nebulizer - when sick    Hospitalization History: Not been hospitalized over the last year for breathing related problem. PNA in 2021    Sleep history:  Denies snoring, apnea, feeling tired during the day or taking naps during the day.     Current History 7/24/2024    On today's visit, the patient reports Has used albuterol occasionally. Denies SOB at rest. He got RSV vaccine.  He currently is being treated for a UTI but denies cough wheezing fever chills runny nose postnasal drip or heartburn he notes he gets UTIs from Botox injection injections but has not had any pneumonia symptoms.  Only used albuterol a Few times    Current History 1/27/2025    On today's visit, the patient reports he had trouble with intubation with bladder botux injection - he has had it done 3-4 times.. He was fatigued x 3 days.   Intermittently needed ALBUTEROL - he can do prevention if family members are sick. A couple times a week. He is doing inhaler every 2 days.   Has not used Bipap in the last 6 months.   Denies any other complaints.   Denies cough, wheeze, chest pain, shortness of breath at rest or GERD   NO respiratory illness since last visit     7/17/2025    On today's visit, the patient reports recent admission for UTI. No respiratory infections. Not triggered by heat or humidity.  Using rescue inhaler once a day. He went out for a 5 mile outdoor journey and was coughing with heat.  No Wheezing. No chest chest tightness.       ALLERGIES AND MEDICATIONS     ALLERGIES  Allergies   Allergen Reactions   • Ciprofloxacin Anaphylaxis     Has taken as an adult, anaphylaxis as an infant   • Sulfamethoxazole-Trimethoprim Anaphylaxis   • Sutures Other     Patients mother states patient reacts to dissolveable  suture    • Fish Containing Products Hives   • Shellfish Containing Products Hives   • Vancomycin Other     Red Man's syndrome        MEDICATIONS  Current Outpatient Medications   Medication Sig Dispense Refill   • baclofen (Lioresal) 20 mg tablet Take 2 tablets (40 mg) by mouth 3 times a day. 180 tablet 11   • cholecalciferol (Vitamin D-3) 50 mcg (2,000 unit) capsule Take 1 capsule (50 mcg) by mouth early in the morning..     • dantrolene (Dantrium) 25 mg capsule Take 2 capsules (50 mg) by mouth 3 times a day. 180 capsule 11   • docusate sodium 250 mg capsule Take 1 capsule (250 mg) by mouth once daily.     • ferrous sulfate ER (Slow Fe) 142 mg ER tablet Take 1 tablet by mouth once daily.     • L. ACIDOPHILUS/BIFID. ANIMALIS ORAL Take 1 capsule by mouth once daily.     • mirabegron (Myrbetriq) 50 mg tablet extended release 24 hr 24 hr tablet Take 1 tablet (50 mg) by mouth once daily. 30 tablet 11   • mometasone (Asmanex Twisthaler) 220 mcg/ actuation (14) inhaler Inhale 2 puffs once daily as needed (for shortness of breath). Rinse mouth with water after use to reduce aftertaste and incidence of candidiasis. Do not swallow.     • nitrofurantoin, macrocrystal-monohydrate, (Macrobid) 100 mg capsule Take 1 capsule (100 mg) by mouth once daily. Start after completing current prescription for nitrofurantoin 30 capsule 3   • omeprazole (PriLOSEC) 40 mg DR capsule Take 1 capsule (40 mg) by mouth once daily in the morning. Take before meals. Do not crush or chew. 30 capsule 11   • oxyBUTYnin XL (Ditropan-XL) 15 mg 24 hr tablet Take 1 tablet (15 mg) by mouth 2 times a day. 60 tablet 11   • polyethylene glycol (Miralax) 17 gram/dose powder Mix 1 capful (17 g) of powder with 4 to 8 ounces of beverage and drink once daily. 1700 g 0   • albuterol 2.5 mg /3 mL (0.083 %) nebulizer solution Take 3 mL (2.5 mg) by nebulization every 4 hours if needed for wheezing. Inhale one vial every 4-6 hours as needed for cough, wheezing and  shortness of breath 75 mL 3   • montelukast (Singulair) 10 mg tablet Take 1 tablet (10 mg) by mouth once daily. 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), Asthma, GERD (gastroesophageal reflux disease), Hemiplegia (Multi), History of transfusion, Immunocompromised, Intra-abdominal abscess (Multi), Mitrofanoff appendicovesicostomy present (Multi), MSSA (methicillin susceptible Staphylococcus aureus), Myelomalacia (Multi), Neurogenic bladder, Neurogenic bowel, Neuromuscular weakness (Multi) (03/08/2023), Other conditions influencing health status (07/21/2013), Other conditions influencing health status (03/04/2022), Pectus excavatum (07/21/2013), Peritoneal abscess (Multi) (05/24/2021), Peritonitis, Personal history of diseases of the skin and subcutaneous tissue (05/17/2019), Personal history of diseases of the skin and subcutaneous tissue, Personal history of other diseases of the digestive system (04/13/2020), Personal history of other infectious and parasitic diseases (07/24/2019), Personal history of other infectious and parasitic diseases (09/24/2018), Reactive airway disease ([facility]), Recurrent UTI (urinary tract infection), Respiratory insufficiency, Restrictive lung disease, Scoliosis, unspecified, Snoring, 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    PAST SURGICAL HISTORY  Past Surgical History:   Procedure Laterality Date   • ACHILLES TENDON SURGERY  10/30/2013    Subcutaneous Tenotomy Achilles Tendon Under Gen Anesthesia   • BACLOFEN PUMP IMPLANTATION  2019   • BRONCHOSCOPY     • CYSTOSCOPY  12/12/2024   • GASTROSTOMY  10/30/2013    Gastric Surgery Feeding Gastrostomy s/p removal   • MITROFANOFF PROCEDURE  08/06/2018   • NISSEN FUNDOPLICATION      2003   • OTHER SURGICAL HISTORY  10/30/2013    Direct Laryngoscopy   • OTHER SURGICAL HISTORY  10/30/2013    Closed Treatment Fx Of Proximal Femoral Neck W/ Manipulation   • OTHER SURGICAL HISTORY  04/16/2015    Complex Repair Of Wound Trunk 2.6 To 7.5 Cm   • OTHER SURGICAL HISTORY  10/14/2022    Circumcision   • SPINAL FUSION      x2 2015, 2017   • TRACHEOSTOMY TUBE PLACEMENT      s/p decannulation 2005   • US GUIDED PERCUTANEOUS PERITONEAL OR RETROPERITONEAL FLUID COLLECTION DRAINAGE  04/08/2021    US GUIDED PERCUTANEOUS PERITONEAL OR RETROPERITONEAL FLUID COLLECTION DRAINAGE 4/8/2021 [facility]       IMMUNIZATION HISTORY  Immunization History   Administered Date(s) Administered   • DTaP vaccine, pediatric  (INFANRIX) 2000, 2000, 2002, 06/06/2003, 09/28/2007   • Flu vaccine (IIV4), preservative free *Check age/dose* 11/26/2014, 11/03/2017, 09/30/2020   • Flu vaccine, trivalent, preservative free, age 6 months and greater (Fluarix/Fluzone/Flulaval) 08/28/2012   • HPV 9-valent vaccine (GARDASIL 9) 05/22/2024, 06/25/2024, 11/26/2024   • Hep A, Unspecified 07/16/2010, 08/05/2011   • Hep B, Unspecified 2002, 2002, 03/14/2003   • HiB, unspecified 2002, 2002, 2002, 06/06/2003   • Influenza, injectable, quadrivalent 03/04/2022   • Influenza, seasonal, injectable 10/17/2008, 10/02/2009, 10/01/2022, 10/13/2022   • MMR vaccine, subcutaneous (MMR II) 03/14/2003, 09/28/2007   •  Meningococcal ACWY vaccine (MENVEO) 09/30/2020   • Meningococcal ACWY-D (Menactra) 4-valent conjugate vaccine 11/26/2014   • Meningococcal B, Unspecified 09/30/2020   • Meningococcal MPSV4 11/26/2014   • Moderna SARS-CoV-2 Vaccination 02/13/2021, 03/13/2021, 05/01/2021   • Novel influenza-H1N1-09, preservative-free 11/24/2009, 01/15/2010   • Pneumococcal conjugate vaccine, 13-valent (PREVNAR 13) 2002, 2002, 03/14/2003, 06/06/2003, 10/17/2008   • Pneumococcal polysaccharide vaccine, 23-valent, age 2 years and older (PNEUMOVAX 23) 03/04/2022   • Polio, Unspecified 2002, 2002, 2002, 09/28/2007   • Tdap vaccine, age 7 year and older (BOOSTRIX, ADACEL) 06/26/2014, 09/30/2020   • Varicella vaccine, subcutaneous (VARIVAX) 03/14/2003, 09/28/2007       SOCIAL HISTORY  He  reports that he has never smoked. He has never been exposed to tobacco smoke. He has never used smokeless tobacco. He reports current alcohol use. He reports that he does not use drugs. He Patient     OCCUPATIONAL/ENVIRONMENTAL HISTORY  Previously worked as: disabled   DOES/DOES NOT EC: does not have known exposure to asbestos, silica, beryllium or inhaled metals.  DOES/DOES NOT EC: does not have exposure to birds or exotic animals.    FAMILY HISTORY  Family History   Problem Relation Name Age of Onset   • Diabetes Maternal Grandfather       DOES/DOES NOT EC: does not have a family history of pulmonary disease.  DOES/DOES NOT EC: does not have a family history of cancer.  DOES/DOES NOT EC: does not have a family history of autoimmune disorders.    RESULTS/DATA     Pulmonary Function Test Results         Complete Pulmonary Function Test Pre/Post Bronchodialator (Spirometry Pre/Post/DLCO/Lung Volumes) 5/16/2024  Status: Final result     Study Result    Narrative & Impression   Spirometry shows no obstruction. There is no significant bronchodilator response.  Lung volumes indicate a moderate restrictive defect. There is  hyperinflation present. The DLCO corrected for hemoglobin is mildly reduced.     Scans on Order [id number]      Pulmonary Function Test - Scan on 5/16/2024  9:19 AM: Exhaled nitric oxide                      Pulmonary Function Test - Scan on 5/17/2024  5:26 PM                                          Chest Radiograph     XR chest 1 view 08/08/2023      Impression  No significant changes.    Low lung volumes with bronchopulmonary crowding.    No focal consolidation.    No pleural effusion or pneumothorax seen.    Cardiac silhouette normal in size.    Stable postop changes of the spine fusion.      MACRO:  None      Chest CT Scan     CT CHEST ABDOMEN PELVIS W IV CONTRAST; CT LUMBAR SPINE RETROSPECTIVE  RECONSTRUCTION PROTOCOL; CT THORACIC SPINE RETROSPECTIVE  RECONSTRUCTION PROTOCOL;  6/6/2025 11:30 pm; 6/6/2025 11:38 pm;  6/6/2025 11:33 pm      FINDINGS:  Evaluation is slightly limited by photon starvation secondary to  large patient body habitus and artifact from spinal fusion hardware  and baclofen pump.      CHEST:      LUNG/PLEURA/LARGE AIRWAYS:  Platelike opacity in the right lower lobe with associated parenchymal  volume loss most likely relates to atelectasis. Respiratory motion  artifact slightly limits evaluation of the lung parenchyma. No other  focal parenchymal abnormality. No pleural effusion or pneumothorax.      VESSELS:  No traumatic aortic injury is appreciated within the limitations of  this non-EKG gated study.  The thoracic aorta is of normal course and  caliber.  Main pulmonary artery and its branches are normal in  caliber.  No coronary artery calcifications are seen.      HEART:  The heart is normal in size.   There is no pericardial effusion.      MEDIASTINUM AND KRISTEN:  No pneumomediastinum, abnormal mediastinal fluid collection or  mediastinal hematoma are appreciated.  No mediastinal, hilar or  biaxillary adenopathy is present.  The esophagus is normal in course  and caliber.      CHEST WALL  AND LOWER NECK:  No acute fracture or dislocation of the included osseous structures  are appreciated.  No suspicious osseous lesions are identified.  The  thoracic wall soft tissues are within normal limits.      ABDOMEN:      LIVER:  Enlarged. No focal lesion.      GALLBLADDER:  The gallbladder is nondistended without evidence of radiopaque stone.      BILE DUCTS:  The intahepatic and extrahepatic bile ducts are not dilated.      PANCREAS:  The pancreas appears unremarkable.      SPLEEN:  No parenchymal perfusion deficit of the spleen is appreciated to  suggest contusion or laceration. There is no subcapsular hematoma, no  perisplenic fluid collection.      ADRENAL GLANDS:  The bilateral adrenal glands are unremarkable in appearance.      KIDNEYS AND URETERS:  No parenchymal perfusion deficit is appreciated in bilateral kidneys  to suggest contusion or laceration. There is no subcapsular hematoma,  no perinephric fluid collection.  No hydroureteronephrosis or  nephroureterolithiasis is present.      PELVIS:      BLADDER:  Urinary bladder is incompletely distended and suboptimally evaluated.  Suspected urothelial hyperemia and perivesical fat stranding,  suspicious for acute cystitis.      REPRODUCTIVE ORGANS:  Prostate is not enlarged.      BOWEL:  The stomach is unremarkable.  The small bowel is normal in caliber  without evidence of focal wall thickening or obstruction.  There is  no evidence of focal wall thickening or dilatation of the large  bowel.  The appendix is not definitely visualized, although there are  no pericecal inflammatory changes to suggest acute appendicitis.      VESSELS:  The aorta and IVC are within normal limits.  The principal  vasculature of the abdomen and pelvis is patent.      PERITONEUM/RETROPERITONEUM/LYMPH NODES:  There is no evidence of intra- or retroperitoneal hematoma.  There is  no free or loculated fluid collection, no free intraperitoneal air.  No abdominopelvic  lymphadenopathy is present.      BONES AND ABDOMINAL WALL:  No evidence of acute fracture or dislocation of the included osseous  structures. Thoracic and lumbar and sacral fusion, with intact  orthopedic hardware. Posterior dislocation of the both hip joints. No  suspicious osseous lesions are identified.  The abdominal wall soft  tissues appear normal. Baclofen pump.      Thoracic spine:  ALIGNMENT: Normal.  VERTEBRAE: No acute fracture. No osteolysis or periostitis.  PREVERTEBRAL SOFT TISSUES: Within normal limits.      OTHER FINDINGS: Spinal hardware is intact.  SPINAL CANAL: No critical spinal canal stenosis.      Lumbar spine:  ALIGNMENT: Normal.  VERTEBRAE: No acute fracture. No osteolysis or periostitis.  PREVERTEBRAL SOFT TISSUES: Within normal limits.      OTHER FINDINGS: Spinal hardware is intact.  SPINAL CANAL: No critical spinal canal stenosis.      IMPRESSION:  Evaluation is slightly limited by photon starvation secondary to  large patient body habitus and artifact from spinal fusion hardware  and baclofen pump.      No definite evidence of acute pathology in the chest. Platelike  atelectasis in the right lower lobe. Mild cardiomegaly.      Urinary bladder is incompletely distended and suboptimally evaluated.  Suspected urothelial hyperemia and perivesical fat stranding,  suspicious for acute cystitis.      Otherwise, no definite evidence of acute pathology in the abdomen or  pelvis.      No evidence of acute thoracic or lumbar spine abnormality.      MACRO:  None        Echocardiogram     No testing done         REVIEW OF SYSTEMS     REVIEW OF SYSTEMS  Review of Systems   All other systems reviewed and are negative.        PHYSICAL EXAM     VITAL SIGNS: BP 97/60   Pulse 66   Resp 18   Wt 103 kg (227 lb)   SpO2 94%   BMI 33.52 kg/m²  BP 97/60   Pulse 66   Resp 18   Wt 103 kg (227 lb)   SpO2 94%   BMI 33.52 kg/m²  BP 97/60   Pulse 66   Resp 18   Wt 103 kg (227 lb)   SpO2 94%   BMI 33.52  kg/m²      CURRENT WEIGHT: [unfilled]  BMI: [unfilled]  PREVIOUS WEIGHTS:  Wt Readings from Last 3 Encounters:   07/17/25 103 kg (227 lb)   06/25/25 106 kg (233 lb)   06/18/25 106 kg (233 lb)       Physical Exam  Constitutional:       Appearance: Normal appearance.   HENT:      Head: Normocephalic and atraumatic.      Right Ear: External ear normal.      Left Ear: External ear normal.      Nose: Nose normal.     Eyes:      Extraocular Movements: Extraocular movements intact.      Conjunctiva/sclera: Conjunctivae normal.      Pupils: Pupils are equal, round, and reactive to light.       Cardiovascular:      Rate and Rhythm: Normal rate and regular rhythm.      Pulses: Normal pulses.      Heart sounds: Normal heart sounds.   Pulmonary:      Effort: Pulmonary effort is normal.      Breath sounds: Normal breath sounds.     Musculoskeletal:         General: Normal range of motion.      Cervical back: Normal range of motion and neck supple.      Comments: Upper extremity     Skin:     General: Skin is warm and dry.     Neurological:      General: No focal deficit present.      Mental Status: He is alert and oriented to person, place, and time. Mental status is at baseline.      Comments: In a wheel chair quadrapelgic   Psychiatric:         Mood and Affect: Mood normal.         Behavior: Behavior normal.         Thought Content: Thought content normal.         Judgment: Judgment normal.           ASSESSMENT/PLAN     Mr. Fernandez is a 25 y.o. 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), Asthma, GERD (gastroesophageal reflux disease), Hemiplegia (Multi), History of transfusion, Immunocompromised, Intra-abdominal abscess (Multi), Mitrofanoff appendicovesicostomy present (Multi), MSSA (methicillin susceptible Staphylococcus aureus), Myelomalacia (Multi), Neurogenic bladder, Neurogenic bowel,  Neuromuscular weakness (Multi) (03/08/2023), Other conditions influencing health status (07/21/2013), Other conditions influencing health status (03/04/2022), Pectus excavatum (07/21/2013), Peritoneal abscess (Multi) (05/24/2021), Peritonitis, Personal history of diseases of the skin and subcutaneous tissue (05/17/2019), Personal history of diseases of the skin and subcutaneous tissue, Personal history of other diseases of the digestive system (04/13/2020), Personal history of other infectious and parasitic diseases (07/24/2019), Personal history of other infectious and parasitic diseases (09/24/2018), Reactive airway disease ([facility]), Recurrent UTI (urinary tract infection), Respiratory insufficiency, Restrictive lung disease, Scoliosis, unspecified, Snoring, 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 He was referred to the [facility] for evaluation of reactive airway disease    Problem List and Orders      Assessment and Plan / Recommendations:  Problem List Items Addressed This Visit    None        Reactive airway disease  - albuterol hfa 2 puffs or albuterol nebs every 4-6 hours as needed  - cont symbicort as needed  - has acapella device he uses when he is sick  - bipap at night when he is sick - not used in last 6 months   - refilled singulair   Allergy medications prescribed/continued today include: nasal spray as needed, Zyrtec/cetirizine as need, Singulair/montelukast daily      pulmonary function test and FENO 16 ---Spirometry shows no obstruction. There is no significant bronchodilator response. Lung volumes indicate a moderate restrictive defect. There is hyperinflation present. The DLCO corrected for hemoglobin is mildly reduced.         Thank you for visiting the Pulmonary clinic today!       Return to clinic after ___6-12  months and/or sooner if  needed   Griselda Shea CNP  My office number is [phone] -     Any test results will be discussed at next visit -- please make sure to make a follow up appt after testing.

## 2025-07-17 ENCOUNTER — APPOINTMENT (OUTPATIENT)
Facility: CLINIC | Age: 25
End: 2025-07-17
Payer: COMMERCIAL

## 2025-07-17 VITALS
OXYGEN SATURATION: 94 % | HEART RATE: 66 BPM | DIASTOLIC BLOOD PRESSURE: 60 MMHG | BODY MASS INDEX: 33.52 KG/M2 | RESPIRATION RATE: 18 BRPM | SYSTOLIC BLOOD PRESSURE: 97 MMHG | WEIGHT: 227 LBS

## 2025-07-17 DIAGNOSIS — J45.20 MILD INTERMITTENT REACTIVE AIRWAY DISEASE WITHOUT COMPLICATION (HHS-HCC): ICD-10-CM

## 2025-07-17 PROCEDURE — G8433 SCR FOR DEP NOT CPT DOC RSN: HCPCS | Performed by: NURSE PRACTITIONER

## 2025-07-17 PROCEDURE — 1036F TOBACCO NON-USER: CPT | Performed by: NURSE PRACTITIONER

## 2025-07-17 PROCEDURE — 99214 OFFICE O/P EST MOD 30 MIN: CPT | Performed by: NURSE PRACTITIONER

## 2025-07-17 RX ORDER — MONTELUKAST SODIUM 10 MG/1
10 TABLET ORAL DAILY
Qty: 30 TABLET | Refills: 11 | Status: SHIPPED | OUTPATIENT
Start: 2025-07-17 | End: 2025-08-16

## 2025-07-17 ASSESSMENT — ENCOUNTER SYMPTOMS
LOSS OF SENSATION IN FEET: 0
DEPRESSION: 0
OCCASIONAL FEELINGS OF UNSTEADINESS: 1

## 2025-07-17 NOTE — PATIENT INSTRUCTIONS
Reactive airway disease  - albuterol hfa 2 puffs or albuterol nebs every 4-6 hours as needed  - cont symbicort as needed  - has acapella device he uses when he is sick  - bipap at night when he is sick - not used in last 6 months   - refilled singulair   Allergy medications prescribed/continued today include: nasal spray as needed, Zyrtec/cetirizine as need, Singulair/montelukast daily      pulmonary function test and FENO 16 ---Spirometry shows no obstruction. There is no significant bronchodilator response. Lung volumes indicate a moderate restrictive defect. There is hyperinflation present. The DLCO corrected for hemoglobin is mildly reduced.         Thank you for visiting the Pulmonary clinic today!       Return to clinic after ___6-12  months and/or sooner if needed   Griselda Shea CNP  My office number is [phone] -     Any test results will be discussed at next visit -- please make sure to make a follow up appt after testing.

## 2025-07-31 ENCOUNTER — TELEPHONE (OUTPATIENT)
Dept: ORTHOPEDIC SURGERY | Facility: HOSPITAL | Age: 25
End: 2025-07-31
Payer: COMMERCIAL

## 2025-07-31 DIAGNOSIS — G80.0 SPASTIC QUADRIPLEGIC CEREBRAL PALSY (MULTI): ICD-10-CM

## 2025-07-31 DIAGNOSIS — G80.0 SPASTIC QUADRIPLEGIC CEREBRAL PALSY (MULTI): Primary | ICD-10-CM

## 2025-07-31 RX ORDER — MUPIROCIN 20 MG/G
OINTMENT TOPICAL
Qty: 22 G | Refills: 1 | Status: SHIPPED | OUTPATIENT
Start: 2025-07-31

## 2025-07-31 RX ORDER — MUPIROCIN 20 MG/G
OINTMENT TOPICAL 2 TIMES DAILY
Qty: 22 G | Refills: 1 | Status: SHIPPED | OUTPATIENT
Start: 2025-07-31 | End: 2025-07-31

## 2025-07-31 NOTE — TELEPHONE ENCOUNTER
MEDICATION REFILL REQUEST    Name of Patient: Jose Fernandez  Parent or Guardian's Name: Jose      Medication: Mupirocin    Pharmacy: [facility] Changes: Pharm called and needs to confirm directions on this med.     Call Back Number: [phone]   Previous Visit: Date 7/17/25 With Rosemary  Date of Next Visit: Date 8/12/25  With Rosemary     I spoke with pharmacy and clarified the Mupirocin is to be used twice daily for 5 days.MM

## 2025-08-05 LAB
ALBUMIN/CREAT UR: 3 MG/G CREAT
BACTERIA #/AREA URNS HPF: ABNORMAL /HPF
CREAT UR-MCNC: 74 MG/DL (ref 20–320)
HYALINE CASTS #/AREA URNS LPF: ABNORMAL /LPF
MICROALBUMIN UR-MCNC: 0.2 MG/DL
RBC #/AREA URNS HPF: ABNORMAL /HPF
SERVICE CMNT-IMP: ABNORMAL
SQUAMOUS #/AREA URNS HPF: ABNORMAL /HPF
WBC #/AREA URNS HPF: ABNORMAL /HPF

## 2025-08-08 ENCOUNTER — TELEPHONE (OUTPATIENT)
Dept: PEDIATRIC GASTROENTEROLOGY | Facility: HOSPITAL | Age: 25
End: 2025-08-08
Payer: COMMERCIAL

## 2025-08-12 ENCOUNTER — HOSPITAL ENCOUNTER (INPATIENT)
Facility: HOSPITAL | Age: 25
LOS: 2 days | Discharge: HOME | End: 2025-08-14
Attending: ORTHOPAEDIC SURGERY | Admitting: ORTHOPAEDIC SURGERY
Payer: COMMERCIAL

## 2025-08-12 ENCOUNTER — ANESTHESIA EVENT (OUTPATIENT)
Dept: OPERATING ROOM | Facility: HOSPITAL | Age: 25
End: 2025-08-12
Payer: COMMERCIAL

## 2025-08-12 ENCOUNTER — APPOINTMENT (OUTPATIENT)
Dept: RADIOLOGY | Facility: HOSPITAL | Age: 25
End: 2025-08-12
Payer: COMMERCIAL

## 2025-08-12 ENCOUNTER — ANESTHESIA (OUTPATIENT)
Dept: OPERATING ROOM | Facility: HOSPITAL | Age: 25
End: 2025-08-12
Payer: COMMERCIAL

## 2025-08-12 DIAGNOSIS — S24.101A: Primary | ICD-10-CM

## 2025-08-12 DIAGNOSIS — G95.11 SPINAL CORD INFARCTION (MULTI): ICD-10-CM

## 2025-08-12 DIAGNOSIS — G82.50 QUADRIPLEGIA AND QUADRIPARESIS (MULTI): ICD-10-CM

## 2025-08-12 DIAGNOSIS — G89.18 POSTOPERATIVE PAIN: ICD-10-CM

## 2025-08-12 PROCEDURE — 3700000002 HC GENERAL ANESTHESIA TIME - EACH INCREMENTAL 1 MINUTE: Performed by: ORTHOPAEDIC SURGERY

## 2025-08-12 PROCEDURE — 2500000002 HC RX 250 W HCPCS SELF ADMINISTERED DRUGS (ALT 637 FOR MEDICARE OP, ALT 636 FOR OP/ED)

## 2025-08-12 PROCEDURE — 2500000005 HC RX 250 GENERAL PHARMACY W/O HCPCS: Performed by: STUDENT IN AN ORGANIZED HEALTH CARE EDUCATION/TRAINING PROGRAM

## 2025-08-12 PROCEDURE — 94640 AIRWAY INHALATION TREATMENT: CPT

## 2025-08-12 PROCEDURE — 3600000009 HC OR TIME - EACH INCREMENTAL 1 MINUTE - PROCEDURE LEVEL FOUR: Performed by: ORTHOPAEDIC SURGERY

## 2025-08-12 PROCEDURE — 51701 INSERT BLADDER CATHETER: CPT

## 2025-08-12 PROCEDURE — 2720000007 HC OR 272 NO HCPCS: Performed by: ORTHOPAEDIC SURGERY

## 2025-08-12 PROCEDURE — 7100000002 HC RECOVERY ROOM TIME - EACH INCREMENTAL 1 MINUTE: Performed by: ORTHOPAEDIC SURGERY

## 2025-08-12 PROCEDURE — 0JWT0VZ REVISION OF INFUSION PUMP IN TRUNK SUBCUTANEOUS TISSUE AND FASCIA, OPEN APPROACH: ICD-10-PCS | Performed by: ORTHOPAEDIC SURGERY

## 2025-08-12 PROCEDURE — 2500000004 HC RX 250 GENERAL PHARMACY W/ HCPCS (ALT 636 FOR OP/ED)

## 2025-08-12 PROCEDURE — 3700000001 HC GENERAL ANESTHESIA TIME - INITIAL BASE CHARGE: Performed by: ORTHOPAEDIC SURGERY

## 2025-08-12 PROCEDURE — 3600000007 HC OR TIME - EACH INCREMENTAL 1 MINUTE - PROCEDURE LEVEL TWO: Performed by: ORTHOPAEDIC SURGERY

## 2025-08-12 PROCEDURE — 2780000003 HC OR 278 NO HCPCS: Performed by: ORTHOPAEDIC SURGERY

## 2025-08-12 PROCEDURE — 7100000001 HC RECOVERY ROOM TIME - INITIAL BASE CHARGE: Performed by: ORTHOPAEDIC SURGERY

## 2025-08-12 PROCEDURE — 62351 IMPLANT SPINAL CANAL CATH: CPT | Performed by: ORTHOPAEDIC SURGERY

## 2025-08-12 PROCEDURE — 3600000004 HC OR TIME - INITIAL BASE CHARGE - PROCEDURE LEVEL FOUR: Performed by: ORTHOPAEDIC SURGERY

## 2025-08-12 PROCEDURE — 1100000001 HC PRIVATE ROOM DAILY

## 2025-08-12 PROCEDURE — 2500000005 HC RX 250 GENERAL PHARMACY W/O HCPCS: Performed by: ORTHOPAEDIC SURGERY

## 2025-08-12 PROCEDURE — 3600000002 HC OR TIME - INITIAL BASE CHARGE - PROCEDURE LEVEL TWO: Performed by: ORTHOPAEDIC SURGERY

## 2025-08-12 PROCEDURE — C1755 CATHETER, INTRASPINAL: HCPCS | Performed by: ORTHOPAEDIC SURGERY

## 2025-08-12 DEVICE — IMPLANTABLE DEVICE: Type: IMPLANTABLE DEVICE | Site: PELVIS | Status: FUNCTIONAL

## 2025-08-12 DEVICE — CATHETER, ASCENDA INTRATHECAL, SHORT EMAN: Type: IMPLANTABLE DEVICE | Site: PELVIS | Status: FUNCTIONAL

## 2025-08-12 RX ORDER — CEFAZOLIN SODIUM 2 G/100ML
2 INJECTION, SOLUTION INTRAVENOUS EVERY 8 HOURS
Status: CANCELLED | OUTPATIENT
Start: 2025-08-12 | End: 2025-08-13

## 2025-08-12 RX ORDER — PROCHLORPERAZINE EDISYLATE 5 MG/ML
5 INJECTION INTRAMUSCULAR; INTRAVENOUS ONCE AS NEEDED
Status: DISCONTINUED | OUTPATIENT
Start: 2025-08-12 | End: 2025-08-12 | Stop reason: HOSPADM

## 2025-08-12 RX ORDER — CHOLECALCIFEROL (VITAMIN D3) 25 MCG
50 TABLET ORAL DAILY
Status: CANCELLED | OUTPATIENT
Start: 2025-08-12

## 2025-08-12 RX ORDER — POLYETHYLENE GLYCOL 3350 17 G/17G
17 POWDER, FOR SOLUTION ORAL DAILY
Status: DISCONTINUED | OUTPATIENT
Start: 2025-08-13 | End: 2025-08-14 | Stop reason: HOSPADM

## 2025-08-12 RX ORDER — ACETAMINOPHEN 325 MG/1
650 TABLET ORAL EVERY 6 HOURS PRN
Status: DISCONTINUED | OUTPATIENT
Start: 2025-08-13 | End: 2025-08-14 | Stop reason: HOSPADM

## 2025-08-12 RX ORDER — POLYETHYLENE GLYCOL 3350 17 G/17G
17 POWDER, FOR SOLUTION ORAL DAILY PRN
Status: DISCONTINUED | OUTPATIENT
Start: 2025-08-12 | End: 2025-08-12

## 2025-08-12 RX ORDER — DIPHENHYDRAMINE HCL 12.5MG/5ML
25 LIQUID (ML) ORAL EVERY 6 HOURS PRN
Status: DISCONTINUED | OUTPATIENT
Start: 2025-08-12 | End: 2025-08-14 | Stop reason: HOSPADM

## 2025-08-12 RX ORDER — PROPOFOL 10 MG/ML
INJECTION, EMULSION INTRAVENOUS AS NEEDED
Status: DISCONTINUED | OUTPATIENT
Start: 2025-08-12 | End: 2025-08-12

## 2025-08-12 RX ORDER — ROCURONIUM BROMIDE 10 MG/ML
INJECTION, SOLUTION INTRAVENOUS AS NEEDED
Status: DISCONTINUED | OUTPATIENT
Start: 2025-08-12 | End: 2025-08-12

## 2025-08-12 RX ORDER — BISACODYL 5 MG
10 TABLET, DELAYED RELEASE (ENTERIC COATED) ORAL DAILY PRN
Status: DISCONTINUED | OUTPATIENT
Start: 2025-08-12 | End: 2025-08-14 | Stop reason: HOSPADM

## 2025-08-12 RX ORDER — ALBUTEROL SULFATE 0.83 MG/ML
2.5 SOLUTION RESPIRATORY (INHALATION) EVERY 4 HOURS PRN
Status: CANCELLED | OUTPATIENT
Start: 2025-08-12

## 2025-08-12 RX ORDER — ONDANSETRON HYDROCHLORIDE 2 MG/ML
INJECTION, SOLUTION INTRAVENOUS AS NEEDED
Status: DISCONTINUED | OUTPATIENT
Start: 2025-08-12 | End: 2025-08-12

## 2025-08-12 RX ORDER — PROCHLORPERAZINE EDISYLATE 5 MG/ML
10 INJECTION INTRAMUSCULAR; INTRAVENOUS EVERY 6 HOURS PRN
Status: DISCONTINUED | OUTPATIENT
Start: 2025-08-12 | End: 2025-08-14 | Stop reason: HOSPADM

## 2025-08-12 RX ORDER — NITROFURANTOIN 25; 75 MG/1; MG/1
100 CAPSULE ORAL DAILY
Status: DISCONTINUED | OUTPATIENT
Start: 2025-08-13 | End: 2025-08-14 | Stop reason: HOSPADM

## 2025-08-12 RX ORDER — DOCUSATE SODIUM 100 MG/1
200 CAPSULE, LIQUID FILLED ORAL DAILY
Status: DISCONTINUED | OUTPATIENT
Start: 2025-08-13 | End: 2025-08-12

## 2025-08-12 RX ORDER — SODIUM CHLORIDE, SODIUM LACTATE, POTASSIUM CHLORIDE, CALCIUM CHLORIDE 600; 310; 30; 20 MG/100ML; MG/100ML; MG/100ML; MG/100ML
100 INJECTION, SOLUTION INTRAVENOUS CONTINUOUS
Status: ACTIVE | OUTPATIENT
Start: 2025-08-12 | End: 2025-08-13

## 2025-08-12 RX ORDER — PANTOPRAZOLE SODIUM 40 MG/1
40 TABLET, DELAYED RELEASE ORAL
Status: CANCELLED | OUTPATIENT
Start: 2025-08-13

## 2025-08-12 RX ORDER — OXYBUTYNIN CHLORIDE 5 MG/1
15 TABLET ORAL 2 TIMES DAILY
Status: DISCONTINUED | OUTPATIENT
Start: 2025-08-12 | End: 2025-08-12

## 2025-08-12 RX ORDER — PSYLLIUM HUSK 0.4 G
1 CAPSULE ORAL 2 TIMES DAILY
Status: DISCONTINUED | OUTPATIENT
Start: 2025-08-12 | End: 2025-08-14 | Stop reason: HOSPADM

## 2025-08-12 RX ORDER — ACETAMINOPHEN 10 MG/ML
INJECTION, SOLUTION INTRAVENOUS AS NEEDED
Status: DISCONTINUED | OUTPATIENT
Start: 2025-08-12 | End: 2025-08-12

## 2025-08-12 RX ORDER — BACLOFEN 20 MG/1
40 TABLET ORAL 3 TIMES DAILY
Status: CANCELLED | OUTPATIENT
Start: 2025-08-12

## 2025-08-12 RX ORDER — LIDOCAINE HCL/PF 100 MG/5ML
SYRINGE (ML) INTRAVENOUS AS NEEDED
Status: DISCONTINUED | OUTPATIENT
Start: 2025-08-12 | End: 2025-08-12

## 2025-08-12 RX ORDER — BACLOFEN 20 MG/1
40 TABLET ORAL 3 TIMES DAILY PRN
Status: DISCONTINUED | OUTPATIENT
Start: 2025-08-12 | End: 2025-08-14 | Stop reason: HOSPADM

## 2025-08-12 RX ORDER — CEFAZOLIN 1 G/1
INJECTION, POWDER, FOR SOLUTION INTRAVENOUS AS NEEDED
Status: DISCONTINUED | OUTPATIENT
Start: 2025-08-12 | End: 2025-08-12

## 2025-08-12 RX ORDER — SODIUM CHLORIDE, SODIUM LACTATE, POTASSIUM CHLORIDE, CALCIUM CHLORIDE 600; 310; 30; 20 MG/100ML; MG/100ML; MG/100ML; MG/100ML
100 INJECTION, SOLUTION INTRAVENOUS CONTINUOUS
Status: DISCONTINUED | OUTPATIENT
Start: 2025-08-12 | End: 2025-08-12 | Stop reason: HOSPADM

## 2025-08-12 RX ORDER — SODIUM CHLORIDE 0.9 G/100ML
INJECTION, SOLUTION IRRIGATION AS NEEDED
Status: DISCONTINUED | OUTPATIENT
Start: 2025-08-12 | End: 2025-08-12 | Stop reason: HOSPADM

## 2025-08-12 RX ORDER — DANTROLENE SODIUM 50 MG/1
50 CAPSULE ORAL 3 TIMES DAILY
Status: CANCELLED | OUTPATIENT
Start: 2025-08-12

## 2025-08-12 RX ORDER — MIRABEGRON 25 MG/1
50 TABLET, FILM COATED, EXTENDED RELEASE ORAL DAILY
Status: DISCONTINUED | OUTPATIENT
Start: 2025-08-13 | End: 2025-08-12 | Stop reason: CLARIF

## 2025-08-12 RX ORDER — ONDANSETRON HYDROCHLORIDE 2 MG/ML
4 INJECTION, SOLUTION INTRAVENOUS EVERY 8 HOURS PRN
Status: DISCONTINUED | OUTPATIENT
Start: 2025-08-12 | End: 2025-08-14 | Stop reason: HOSPADM

## 2025-08-12 RX ORDER — HYDROMORPHONE HYDROCHLORIDE 0.2 MG/ML
0.2 INJECTION INTRAMUSCULAR; INTRAVENOUS; SUBCUTANEOUS EVERY 5 MIN PRN
Status: DISCONTINUED | OUTPATIENT
Start: 2025-08-12 | End: 2025-08-12 | Stop reason: HOSPADM

## 2025-08-12 RX ORDER — ACETAMINOPHEN 325 MG/1
650 TABLET ORAL EVERY 6 HOURS
Status: DISCONTINUED | OUTPATIENT
Start: 2025-08-13 | End: 2025-08-12

## 2025-08-12 RX ORDER — MIRABEGRON 50 MG/1
50 TABLET, FILM COATED, EXTENDED RELEASE ORAL DAILY
Status: CANCELLED | OUTPATIENT
Start: 2025-08-12

## 2025-08-12 RX ORDER — OXYBUTYNIN CHLORIDE 5 MG/1
5 TABLET ORAL 4 TIMES DAILY
Status: DISCONTINUED | OUTPATIENT
Start: 2025-08-12 | End: 2025-08-14 | Stop reason: HOSPADM

## 2025-08-12 RX ORDER — ONDANSETRON 4 MG/1
4 TABLET, FILM COATED ORAL EVERY 8 HOURS PRN
Status: DISCONTINUED | OUTPATIENT
Start: 2025-08-12 | End: 2025-08-14 | Stop reason: HOSPADM

## 2025-08-12 RX ORDER — LIDOCAINE HYDROCHLORIDE 10 MG/ML
0.1 INJECTION, SOLUTION INFILTRATION; PERINEURAL ONCE
Status: DISCONTINUED | OUTPATIENT
Start: 2025-08-12 | End: 2025-08-12 | Stop reason: HOSPADM

## 2025-08-12 RX ORDER — MONTELUKAST SODIUM 10 MG/1
10 TABLET ORAL DAILY
Status: DISCONTINUED | OUTPATIENT
Start: 2025-08-13 | End: 2025-08-14 | Stop reason: HOSPADM

## 2025-08-12 RX ORDER — HYDROMORPHONE HYDROCHLORIDE 1 MG/ML
INJECTION, SOLUTION INTRAMUSCULAR; INTRAVENOUS; SUBCUTANEOUS AS NEEDED
Status: DISCONTINUED | OUTPATIENT
Start: 2025-08-12 | End: 2025-08-12

## 2025-08-12 RX ORDER — DOCUSATE SODIUM 250 MG
1 CAPSULE ORAL DAILY
Status: CANCELLED | OUTPATIENT
Start: 2025-08-12

## 2025-08-12 RX ORDER — BISACODYL 5 MG
10 TABLET, DELAYED RELEASE (ENTERIC COATED) ORAL DAILY PRN
Status: DISCONTINUED | OUTPATIENT
Start: 2025-08-12 | End: 2025-08-12

## 2025-08-12 RX ORDER — BACLOFEN 20 MG/1
40 TABLET ORAL 3 TIMES DAILY
Status: DISCONTINUED | OUTPATIENT
Start: 2025-08-12 | End: 2025-08-12

## 2025-08-12 RX ORDER — DIPHENHYDRAMINE HCL 25 MG
25 CAPSULE ORAL EVERY 6 HOURS PRN
Status: DISCONTINUED | OUTPATIENT
Start: 2025-08-12 | End: 2025-08-14 | Stop reason: HOSPADM

## 2025-08-12 RX ORDER — ALBUTEROL SULFATE 0.83 MG/ML
2.5 SOLUTION RESPIRATORY (INHALATION) EVERY 6 HOURS PRN
Status: DISCONTINUED | OUTPATIENT
Start: 2025-08-12 | End: 2025-08-14

## 2025-08-12 RX ORDER — OXYCODONE HYDROCHLORIDE 5 MG/1
5 TABLET ORAL EVERY 4 HOURS PRN
Status: DISCONTINUED | OUTPATIENT
Start: 2025-08-12 | End: 2025-08-12 | Stop reason: HOSPADM

## 2025-08-12 RX ORDER — MONTELUKAST SODIUM 10 MG/1
10 TABLET ORAL DAILY
Status: CANCELLED | OUTPATIENT
Start: 2025-08-12

## 2025-08-12 RX ORDER — ONDANSETRON HYDROCHLORIDE 2 MG/ML
4 INJECTION, SOLUTION INTRAVENOUS ONCE AS NEEDED
Status: DISCONTINUED | OUTPATIENT
Start: 2025-08-12 | End: 2025-08-12 | Stop reason: HOSPADM

## 2025-08-12 RX ORDER — NITROFURANTOIN 25; 75 MG/1; MG/1
100 CAPSULE ORAL DAILY
Status: CANCELLED | OUTPATIENT
Start: 2025-08-12

## 2025-08-12 RX ORDER — DIPHENHYDRAMINE HYDROCHLORIDE 50 MG/ML
25 INJECTION, SOLUTION INTRAMUSCULAR; INTRAVENOUS EVERY 6 HOURS PRN
Status: DISCONTINUED | OUTPATIENT
Start: 2025-08-12 | End: 2025-08-14 | Stop reason: HOSPADM

## 2025-08-12 RX ORDER — PROCHLORPERAZINE 25 MG/1
25 SUPPOSITORY RECTAL EVERY 12 HOURS PRN
Status: DISCONTINUED | OUTPATIENT
Start: 2025-08-12 | End: 2025-08-14 | Stop reason: HOSPADM

## 2025-08-12 RX ORDER — ACETAMINOPHEN 325 MG/1
975 TABLET ORAL ONCE
Status: DISCONTINUED | OUTPATIENT
Start: 2025-08-12 | End: 2025-08-12 | Stop reason: HOSPADM

## 2025-08-12 RX ORDER — PANTOPRAZOLE SODIUM 40 MG/1
40 TABLET, DELAYED RELEASE ORAL
Status: DISCONTINUED | OUTPATIENT
Start: 2025-08-13 | End: 2025-08-14 | Stop reason: HOSPADM

## 2025-08-12 RX ORDER — MIDAZOLAM HYDROCHLORIDE 2 MG/2ML
INJECTION, SOLUTION INTRAMUSCULAR; INTRAVENOUS AS NEEDED
Status: DISCONTINUED | OUTPATIENT
Start: 2025-08-12 | End: 2025-08-12

## 2025-08-12 RX ORDER — FENTANYL CITRATE 50 UG/ML
INJECTION, SOLUTION INTRAMUSCULAR; INTRAVENOUS AS NEEDED
Status: DISCONTINUED | OUTPATIENT
Start: 2025-08-12 | End: 2025-08-12

## 2025-08-12 RX ORDER — POLYETHYLENE GLYCOL 3350 17 G/17G
17 POWDER, FOR SOLUTION ORAL DAILY
Status: CANCELLED | OUTPATIENT
Start: 2025-08-12

## 2025-08-12 RX ORDER — PROCHLORPERAZINE MALEATE 10 MG
10 TABLET ORAL EVERY 6 HOURS PRN
Status: DISCONTINUED | OUTPATIENT
Start: 2025-08-12 | End: 2025-08-14 | Stop reason: HOSPADM

## 2025-08-12 RX ADMIN — FENTANYL CITRATE 100 MCG: 50 INJECTION, SOLUTION INTRAMUSCULAR; INTRAVENOUS at 14:51

## 2025-08-12 RX ADMIN — ALBUTEROL SULFATE 2.5 MG: 2.5 SOLUTION RESPIRATORY (INHALATION) at 23:06

## 2025-08-12 RX ADMIN — PROPOFOL 50 MG: 10 INJECTION, EMULSION INTRAVENOUS at 17:26

## 2025-08-12 RX ADMIN — PROPOFOL 30 MG: 10 INJECTION, EMULSION INTRAVENOUS at 17:38

## 2025-08-12 RX ADMIN — ROCURONIUM BROMIDE 5 MG: 10 INJECTION INTRAVENOUS at 16:10

## 2025-08-12 RX ADMIN — Medication 1 DOSE: at 17:50

## 2025-08-12 RX ADMIN — DEXAMETHASONE SODIUM PHOSPHATE 4 MG: 4 INJECTION INTRA-ARTICULAR; INTRALESIONAL; INTRAMUSCULAR; INTRAVENOUS; SOFT TISSUE at 14:51

## 2025-08-12 RX ADMIN — ACETAMINOPHEN 1000 MG: 10 INJECTION, SOLUTION INTRAVENOUS at 15:35

## 2025-08-12 RX ADMIN — LIDOCAINE HYDROCHLORIDE 100 MG: 20 INJECTION INTRAVENOUS at 14:51

## 2025-08-12 RX ADMIN — PROPOFOL 200 MG: 10 INJECTION, EMULSION INTRAVENOUS at 14:51

## 2025-08-12 RX ADMIN — HYDROMORPHONE HYDROCHLORIDE 0.2 MG: 1 INJECTION, SOLUTION INTRAMUSCULAR; INTRAVENOUS; SUBCUTANEOUS at 17:24

## 2025-08-12 RX ADMIN — HYDROMORPHONE HYDROCHLORIDE 0.2 MG: 1 INJECTION, SOLUTION INTRAMUSCULAR; INTRAVENOUS; SUBCUTANEOUS at 15:35

## 2025-08-12 RX ADMIN — CEFAZOLIN 2 G: 1 INJECTION, POWDER, FOR SOLUTION INTRAMUSCULAR; INTRAVENOUS at 15:01

## 2025-08-12 RX ADMIN — PROPOFOL 40 MG: 10 INJECTION, EMULSION INTRAVENOUS at 17:36

## 2025-08-12 RX ADMIN — ONDANSETRON 4 MG: 2 INJECTION INTRAMUSCULAR; INTRAVENOUS at 17:24

## 2025-08-12 RX ADMIN — ROCURONIUM BROMIDE 40 MG: 10 INJECTION INTRAVENOUS at 14:52

## 2025-08-12 RX ADMIN — MIDAZOLAM HYDROCHLORIDE 2 MG: 2 INJECTION, SOLUTION INTRAMUSCULAR; INTRAVENOUS at 14:43

## 2025-08-12 RX ADMIN — OXYBUTYNIN CHLORIDE 5 MG: 5 TABLET ORAL at 22:43

## 2025-08-12 RX ADMIN — SODIUM CHLORIDE, SODIUM LACTATE, POTASSIUM CHLORIDE, AND CALCIUM CHLORIDE 100 ML/HR: .6; .31; .03; .02 INJECTION, SOLUTION INTRAVENOUS at 22:06

## 2025-08-12 RX ADMIN — ROCURONIUM BROMIDE 10 MG: 10 INJECTION INTRAVENOUS at 15:37

## 2025-08-12 RX ADMIN — SUGAMMADEX 400 MG: 100 INJECTION, SOLUTION INTRAVENOUS at 17:38

## 2025-08-12 SDOH — SOCIAL STABILITY: SOCIAL INSECURITY: ARE YOU OR HAVE YOU BEEN THREATENED OR ABUSED PHYSICALLY, EMOTIONALLY, OR SEXUALLY BY ANYONE?: NO

## 2025-08-12 SDOH — SOCIAL STABILITY: SOCIAL INSECURITY
ASK PARENT OR GUARDIAN: ARE THERE TIMES WHEN YOU, YOUR CHILD(REN), OR ANY MEMBER OF YOUR HOUSEHOLD FEEL UNSAFE, HARMED, OR THREATENED AROUND PERSONS WITH WHOM YOU KNOW OR LIVE?: NO

## 2025-08-12 SDOH — SOCIAL STABILITY: SOCIAL INSECURITY: WERE YOU ABLE TO COMPLETE ALL THE BEHAVIORAL HEALTH SCREENINGS?: YES

## 2025-08-12 SDOH — SOCIAL STABILITY: SOCIAL INSECURITY: ABUSE: ADULT

## 2025-08-12 SDOH — ECONOMIC STABILITY: FOOD INSECURITY: WITHIN THE PAST 12 MONTHS, THE FOOD YOU BOUGHT JUST DIDN'T LAST AND YOU DIDN'T HAVE MONEY TO GET MORE.: NEVER TRUE

## 2025-08-12 SDOH — ECONOMIC STABILITY: HOUSING INSECURITY: IN THE LAST 12 MONTHS, WAS THERE A TIME WHEN YOU WERE NOT ABLE TO PAY THE MORTGAGE OR RENT ON TIME?: NO

## 2025-08-12 SDOH — ECONOMIC STABILITY: HOUSING INSECURITY: IN THE PAST 12 MONTHS, HOW MANY TIMES HAVE YOU MOVED WHERE YOU WERE LIVING?: 1

## 2025-08-12 SDOH — ECONOMIC STABILITY: FOOD INSECURITY: HOW HARD IS IT FOR YOU TO PAY FOR THE VERY BASICS LIKE FOOD, HOUSING, MEDICAL CARE, AND HEATING?: NOT HARD AT ALL

## 2025-08-12 SDOH — SOCIAL STABILITY: SOCIAL INSECURITY: ARE THERE ANY APPARENT SIGNS OF INJURIES/BEHAVIORS THAT COULD BE RELATED TO ABUSE/NEGLECT?: NO

## 2025-08-12 SDOH — SOCIAL STABILITY: SOCIAL INSECURITY: WITHIN THE LAST YEAR, HAVE YOU BEEN HUMILIATED OR EMOTIONALLY ABUSED IN OTHER WAYS BY YOUR PARTNER OR EX-PARTNER?: NO

## 2025-08-12 SDOH — SOCIAL STABILITY: SOCIAL INSECURITY: WITHIN THE LAST YEAR, HAVE YOU BEEN AFRAID OF YOUR PARTNER OR EX-PARTNER?: NO

## 2025-08-12 SDOH — ECONOMIC STABILITY: FOOD INSECURITY: WITHIN THE PAST 12 MONTHS, YOU WORRIED THAT YOUR FOOD WOULD RUN OUT BEFORE YOU GOT THE MONEY TO BUY MORE.: NEVER TRUE

## 2025-08-12 SDOH — SOCIAL STABILITY: SOCIAL INSECURITY: HAVE YOU HAD ANY THOUGHTS OF HARMING ANYONE ELSE?: NO

## 2025-08-12 SDOH — ECONOMIC STABILITY: HOUSING INSECURITY: AT ANY TIME IN THE PAST 12 MONTHS, WERE YOU HOMELESS OR LIVING IN A SHELTER (INCLUDING NOW)?: NO

## 2025-08-12 SDOH — ECONOMIC STABILITY: INCOME INSECURITY: IN THE PAST 12 MONTHS HAS THE ELECTRIC, GAS, OIL, OR WATER COMPANY THREATENED TO SHUT OFF SERVICES IN YOUR HOME?: NO

## 2025-08-12 SDOH — SOCIAL STABILITY: SOCIAL INSECURITY: DO YOU FEEL UNSAFE GOING BACK TO THE PLACE WHERE YOU ARE LIVING?: NO

## 2025-08-12 SDOH — SOCIAL STABILITY: SOCIAL INSECURITY: DOES ANYONE TRY TO KEEP YOU FROM HAVING/CONTACTING OTHER FRIENDS OR DOING THINGS OUTSIDE YOUR HOME?: NO

## 2025-08-12 SDOH — ECONOMIC STABILITY: HOUSING INSECURITY: DO YOU FEEL UNSAFE GOING BACK TO THE PLACE WHERE YOU LIVE?: NO

## 2025-08-12 SDOH — SOCIAL STABILITY: SOCIAL INSECURITY: DO YOU FEEL ANYONE HAS EXPLOITED OR TAKEN ADVANTAGE OF YOU FINANCIALLY OR OF YOUR PERSONAL PROPERTY?: NO

## 2025-08-12 SDOH — SOCIAL STABILITY: SOCIAL INSECURITY: HAS ANYONE EVER THREATENED TO HURT YOUR FAMILY OR YOUR PETS?: NO

## 2025-08-12 SDOH — HEALTH STABILITY: MENTAL HEALTH: CURRENT SMOKER: 0

## 2025-08-12 SDOH — ECONOMIC STABILITY: TRANSPORTATION INSECURITY: IN THE PAST 12 MONTHS, HAS LACK OF TRANSPORTATION KEPT YOU FROM MEDICAL APPOINTMENTS OR FROM GETTING MEDICATIONS?: NO

## 2025-08-12 SDOH — SOCIAL STABILITY: SOCIAL INSECURITY: HAVE YOU HAD THOUGHTS OF HARMING ANYONE ELSE?: NO

## 2025-08-12 ASSESSMENT — COGNITIVE AND FUNCTIONAL STATUS - GENERAL
MOVING TO AND FROM BED TO CHAIR: TOTAL
CLIMB 3 TO 5 STEPS WITH RAILING: TOTAL
DAILY ACTIVITIY SCORE: 12
DRESSING REGULAR UPPER BODY CLOTHING: A LOT
PERSONAL GROOMING: A LITTLE
MOBILITY SCORE: 7
STANDING UP FROM CHAIR USING ARMS: TOTAL
TURNING FROM BACK TO SIDE WHILE IN FLAT BAD: TOTAL
WALKING IN HOSPITAL ROOM: TOTAL
DRESSING REGULAR LOWER BODY CLOTHING: TOTAL
EATING MEALS: A LITTLE
TOILETING: TOTAL
HELP NEEDED FOR BATHING: A LOT
PATIENT BASELINE BEDBOUND: NO
MOVING FROM LYING ON BACK TO SITTING ON SIDE OF FLAT BED WITH BEDRAILS: A LOT

## 2025-08-12 ASSESSMENT — PATIENT HEALTH QUESTIONNAIRE - PHQ9
SUM OF ALL RESPONSES TO PHQ9 QUESTIONS 1 & 2: 0
2. FEELING DOWN, DEPRESSED OR HOPELESS: NOT AT ALL
1. LITTLE INTEREST OR PLEASURE IN DOING THINGS: NOT AT ALL

## 2025-08-12 ASSESSMENT — PAIN SCALES - GENERAL
PAINLEVEL_OUTOF10: 0 - NO PAIN
PAIN_LEVEL: 1
PAINLEVEL_OUTOF10: 0 - NO PAIN

## 2025-08-12 ASSESSMENT — PAIN - FUNCTIONAL ASSESSMENT

## 2025-08-12 ASSESSMENT — ACTIVITIES OF DAILY LIVING (ADL)
ADEQUATE_TO_COMPLETE_ADL: YES
ASSISTIVE_DEVICE: WHEELCHAIR
DRESSING YOURSELF: NEEDS ASSISTANCE
HEARING - RIGHT EAR: FUNCTIONAL
GROOMING: NEEDS ASSISTANCE
LACK_OF_TRANSPORTATION: NO
FEEDING YOURSELF: NEEDS ASSISTANCE
BATHING: NEEDS ASSISTANCE
PATIENT'S MEMORY ADEQUATE TO SAFELY COMPLETE DAILY ACTIVITIES?: YES
LACK_OF_TRANSPORTATION: NO
JUDGMENT_ADEQUATE_SAFELY_COMPLETE_DAILY_ACTIVITIES: YES
TOILETING: DEPENDENT
WALKS IN HOME: DEPENDENT
HEARING - LEFT EAR: FUNCTIONAL

## 2025-08-12 ASSESSMENT — LIFESTYLE VARIABLES
AUDIT-C TOTAL SCORE: 3
HOW OFTEN DO YOU HAVE A DRINK CONTAINING ALCOHOL: 2-4 TIMES A MONTH
HOW MANY STANDARD DRINKS CONTAINING ALCOHOL DO YOU HAVE ON A TYPICAL DAY: 1 OR 2
HOW OFTEN DO YOU HAVE 6 OR MORE DRINKS ON ONE OCCASION: LESS THAN MONTHLY
AUDIT-C TOTAL SCORE: 3
SKIP TO QUESTIONS 9-10: 0

## 2025-08-12 ASSESSMENT — COLUMBIA-SUICIDE SEVERITY RATING SCALE - C-SSRS
1. IN THE PAST MONTH, HAVE YOU WISHED YOU WERE DEAD OR WISHED YOU COULD GO TO SLEEP AND NOT WAKE UP?: NO
6. HAVE YOU EVER DONE ANYTHING, STARTED TO DO ANYTHING, OR PREPARED TO DO ANYTHING TO END YOUR LIFE?: NO
2. HAVE YOU ACTUALLY HAD ANY THOUGHTS OF KILLING YOURSELF?: NO

## 2025-08-13 LAB
ANION GAP SERPL CALC-SCNC: 16 MMOL/L (ref 10–20)
BUN SERPL-MCNC: 8 MG/DL (ref 6–23)
CALCIUM SERPL-MCNC: 8.8 MG/DL (ref 8.6–10.6)
CHLORIDE SERPL-SCNC: 104 MMOL/L (ref 98–107)
CO2 SERPL-SCNC: 22 MMOL/L (ref 21–32)
CREAT SERPL-MCNC: 0.45 MG/DL (ref 0.5–1.3)
EGFRCR SERPLBLD CKD-EPI 2021: >90 ML/MIN/1.73M*2
ERYTHROCYTE [DISTWIDTH] IN BLOOD BY AUTOMATED COUNT: 13.2 % (ref 11.5–14.5)
GLUCOSE SERPL-MCNC: 76 MG/DL (ref 74–99)
HCT VFR BLD AUTO: 42 % (ref 41–52)
HGB BLD-MCNC: 13.8 G/DL (ref 13.5–17.5)
MCH RBC QN AUTO: 29.8 PG (ref 26–34)
MCHC RBC AUTO-ENTMCNC: 32.9 G/DL (ref 32–36)
MCV RBC AUTO: 91 FL (ref 80–100)
NRBC BLD-RTO: 0 /100 WBCS (ref 0–0)
PLATELET # BLD AUTO: 285 X10*3/UL (ref 150–450)
POTASSIUM SERPL-SCNC: 3.9 MMOL/L (ref 3.5–5.3)
RBC # BLD AUTO: 4.63 X10*6/UL (ref 4.5–5.9)
SODIUM SERPL-SCNC: 138 MMOL/L (ref 136–145)
WBC # BLD AUTO: 11 X10*3/UL (ref 4.4–11.3)

## 2025-08-13 PROCEDURE — 2500000004 HC RX 250 GENERAL PHARMACY W/ HCPCS (ALT 636 FOR OP/ED)

## 2025-08-13 PROCEDURE — 2500000001 HC RX 250 WO HCPCS SELF ADMINISTERED DRUGS (ALT 637 FOR MEDICARE OP)

## 2025-08-13 PROCEDURE — 36415 COLL VENOUS BLD VENIPUNCTURE: CPT

## 2025-08-13 PROCEDURE — 1100000001 HC PRIVATE ROOM DAILY

## 2025-08-13 PROCEDURE — 94640 AIRWAY INHALATION TREATMENT: CPT

## 2025-08-13 PROCEDURE — 80048 BASIC METABOLIC PNL TOTAL CA: CPT

## 2025-08-13 PROCEDURE — 51701 INSERT BLADDER CATHETER: CPT

## 2025-08-13 PROCEDURE — 85027 COMPLETE CBC AUTOMATED: CPT

## 2025-08-13 PROCEDURE — 2500000002 HC RX 250 W HCPCS SELF ADMINISTERED DRUGS (ALT 637 FOR MEDICARE OP, ALT 636 FOR OP/ED)

## 2025-08-13 RX ORDER — CEFAZOLIN SODIUM 2 G/100ML
2 INJECTION, SOLUTION INTRAVENOUS EVERY 8 HOURS
Status: COMPLETED | OUTPATIENT
Start: 2025-08-13 | End: 2025-08-13

## 2025-08-13 RX ADMIN — NITROFURANTOIN (MONOHYDRATE/MACROCRYSTALS) 100 MG: 25; 75 CAPSULE ORAL at 08:56

## 2025-08-13 RX ADMIN — OXYBUTYNIN CHLORIDE 5 MG: 5 TABLET ORAL at 12:35

## 2025-08-13 RX ADMIN — PANTOPRAZOLE SODIUM 40 MG: 40 TABLET, DELAYED RELEASE ORAL at 05:56

## 2025-08-13 RX ADMIN — ALBUTEROL SULFATE 2.5 MG: 2.5 SOLUTION RESPIRATORY (INHALATION) at 15:43

## 2025-08-13 RX ADMIN — Medication 1 TABLET: at 22:11

## 2025-08-13 RX ADMIN — Medication 1 TABLET: at 08:51

## 2025-08-13 RX ADMIN — CEFAZOLIN SODIUM 2 G: 2 INJECTION, SOLUTION INTRAVENOUS at 08:50

## 2025-08-13 RX ADMIN — OXYBUTYNIN CHLORIDE 5 MG: 5 TABLET ORAL at 22:11

## 2025-08-13 RX ADMIN — CEFAZOLIN SODIUM 2 G: 2 INJECTION, SOLUTION INTRAVENOUS at 18:24

## 2025-08-13 RX ADMIN — POLYETHYLENE GLYCOL 3350 17 G: 17 POWDER, FOR SOLUTION ORAL at 08:49

## 2025-08-13 RX ADMIN — OXYBUTYNIN CHLORIDE 5 MG: 5 TABLET ORAL at 18:21

## 2025-08-13 RX ADMIN — DOCUSATE SODIUM 250 MG: 100 CAPSULE, LIQUID FILLED ORAL at 08:56

## 2025-08-13 RX ADMIN — MONTELUKAST 10 MG: 10 TABLET, FILM COATED ORAL at 08:55

## 2025-08-13 RX ADMIN — OXYBUTYNIN CHLORIDE 5 MG: 5 TABLET ORAL at 05:56

## 2025-08-13 ASSESSMENT — COGNITIVE AND FUNCTIONAL STATUS - GENERAL
MOVING TO AND FROM BED TO CHAIR: TOTAL
STANDING UP FROM CHAIR USING ARMS: TOTAL
CLIMB 3 TO 5 STEPS WITH RAILING: TOTAL
DAILY ACTIVITIY SCORE: 12
HELP NEEDED FOR BATHING: A LOT
TOILETING: TOTAL
PERSONAL GROOMING: A LITTLE
DRESSING REGULAR UPPER BODY CLOTHING: A LOT
DRESSING REGULAR LOWER BODY CLOTHING: TOTAL
EATING MEALS: A LITTLE
TURNING FROM BACK TO SIDE WHILE IN FLAT BAD: TOTAL
WALKING IN HOSPITAL ROOM: TOTAL
MOBILITY SCORE: 7
MOVING FROM LYING ON BACK TO SITTING ON SIDE OF FLAT BED WITH BEDRAILS: A LOT

## 2025-08-13 ASSESSMENT — ACTIVITIES OF DAILY LIVING (ADL): LACK_OF_TRANSPORTATION: NO

## 2025-08-13 ASSESSMENT — PAIN - FUNCTIONAL ASSESSMENT
PAIN_FUNCTIONAL_ASSESSMENT: 0-10
PAIN_FUNCTIONAL_ASSESSMENT: 0-10

## 2025-08-13 ASSESSMENT — PAIN SCALES - GENERAL
PAINLEVEL_OUTOF10: 0 - NO PAIN
PAINLEVEL_OUTOF10: 0 - NO PAIN

## 2025-08-14 VITALS
DIASTOLIC BLOOD PRESSURE: 69 MMHG | TEMPERATURE: 98.2 F | RESPIRATION RATE: 16 BRPM | HEIGHT: 69 IN | SYSTOLIC BLOOD PRESSURE: 132 MMHG | HEART RATE: 128 BPM | BODY MASS INDEX: 34.06 KG/M2 | WEIGHT: 229.94 LBS | OXYGEN SATURATION: 97 %

## 2025-08-14 LAB
ANION GAP SERPL CALC-SCNC: 18 MMOL/L (ref 10–20)
BUN SERPL-MCNC: 8 MG/DL (ref 6–23)
CALCIUM SERPL-MCNC: 9.2 MG/DL (ref 8.6–10.6)
CHLORIDE SERPL-SCNC: 102 MMOL/L (ref 98–107)
CO2 SERPL-SCNC: 23 MMOL/L (ref 21–32)
CREAT SERPL-MCNC: 0.48 MG/DL (ref 0.5–1.3)
EGFRCR SERPLBLD CKD-EPI 2021: >90 ML/MIN/1.73M*2
ERYTHROCYTE [DISTWIDTH] IN BLOOD BY AUTOMATED COUNT: 12.9 % (ref 11.5–14.5)
GLUCOSE SERPL-MCNC: 68 MG/DL (ref 74–99)
HCT VFR BLD AUTO: 42.9 % (ref 41–52)
HGB BLD-MCNC: 14.2 G/DL (ref 13.5–17.5)
MCH RBC QN AUTO: 29.8 PG (ref 26–34)
MCHC RBC AUTO-ENTMCNC: 33.1 G/DL (ref 32–36)
MCV RBC AUTO: 90 FL (ref 80–100)
NRBC BLD-RTO: 0 /100 WBCS (ref 0–0)
PLATELET # BLD AUTO: 267 X10*3/UL (ref 150–450)
POTASSIUM SERPL-SCNC: 3.4 MMOL/L (ref 3.5–5.3)
RBC # BLD AUTO: 4.77 X10*6/UL (ref 4.5–5.9)
SODIUM SERPL-SCNC: 140 MMOL/L (ref 136–145)
WBC # BLD AUTO: 11.6 X10*3/UL (ref 4.4–11.3)

## 2025-08-14 PROCEDURE — 80048 BASIC METABOLIC PNL TOTAL CA: CPT

## 2025-08-14 PROCEDURE — 2500000002 HC RX 250 W HCPCS SELF ADMINISTERED DRUGS (ALT 637 FOR MEDICARE OP, ALT 636 FOR OP/ED): Performed by: ORTHOPAEDIC SURGERY

## 2025-08-14 PROCEDURE — 2500000002 HC RX 250 W HCPCS SELF ADMINISTERED DRUGS (ALT 637 FOR MEDICARE OP, ALT 636 FOR OP/ED)

## 2025-08-14 PROCEDURE — 2500000001 HC RX 250 WO HCPCS SELF ADMINISTERED DRUGS (ALT 637 FOR MEDICARE OP)

## 2025-08-14 PROCEDURE — 97161 PT EVAL LOW COMPLEX 20 MIN: CPT | Mod: GP

## 2025-08-14 PROCEDURE — 36415 COLL VENOUS BLD VENIPUNCTURE: CPT

## 2025-08-14 PROCEDURE — 2500000004 HC RX 250 GENERAL PHARMACY W/ HCPCS (ALT 636 FOR OP/ED)

## 2025-08-14 PROCEDURE — 85027 COMPLETE CBC AUTOMATED: CPT

## 2025-08-14 PROCEDURE — 94640 AIRWAY INHALATION TREATMENT: CPT

## 2025-08-14 RX ORDER — BACLOFEN 20 MG/1
40 TABLET ORAL 3 TIMES DAILY PRN
Status: CANCELLED | OUTPATIENT
Start: 2025-08-14

## 2025-08-14 RX ORDER — ALBUTEROL SULFATE 0.83 MG/ML
2.5 SOLUTION RESPIRATORY (INHALATION) EVERY 4 HOURS
Status: DISCONTINUED | OUTPATIENT
Start: 2025-08-14 | End: 2025-08-14 | Stop reason: HOSPADM

## 2025-08-14 RX ORDER — PSYLLIUM HUSK 0.4 G
1 CAPSULE ORAL 2 TIMES DAILY
Qty: 60 TABLET | Refills: 0 | Status: CANCELLED | OUTPATIENT
Start: 2025-08-14

## 2025-08-14 RX ORDER — ONDANSETRON 4 MG/1
4 TABLET, FILM COATED ORAL EVERY 8 HOURS PRN
Qty: 20 TABLET | Refills: 0 | Status: CANCELLED | OUTPATIENT
Start: 2025-08-14

## 2025-08-14 RX ORDER — ALBUTEROL SULFATE 0.83 MG/ML
2.5 SOLUTION RESPIRATORY (INHALATION) EVERY 4 HOURS
Status: DISCONTINUED | OUTPATIENT
Start: 2025-08-14 | End: 2025-08-14

## 2025-08-14 RX ORDER — ACETAMINOPHEN 325 MG/1
650 TABLET ORAL EVERY 6 HOURS PRN
Qty: 30 TABLET | Refills: 0 | Status: CANCELLED | OUTPATIENT
Start: 2025-08-14

## 2025-08-14 RX ADMIN — PANTOPRAZOLE SODIUM 40 MG: 40 TABLET, DELAYED RELEASE ORAL at 05:42

## 2025-08-14 RX ADMIN — MONTELUKAST 10 MG: 10 TABLET, FILM COATED ORAL at 08:18

## 2025-08-14 RX ADMIN — Medication 1 TABLET: at 08:18

## 2025-08-14 RX ADMIN — OXYBUTYNIN CHLORIDE 5 MG: 5 TABLET ORAL at 05:41

## 2025-08-14 RX ADMIN — OXYBUTYNIN CHLORIDE 5 MG: 5 TABLET ORAL at 16:16

## 2025-08-14 RX ADMIN — NITROFURANTOIN (MONOHYDRATE/MACROCRYSTALS) 100 MG: 25; 75 CAPSULE ORAL at 08:18

## 2025-08-14 RX ADMIN — DOCUSATE SODIUM 250 MG: 100 CAPSULE, LIQUID FILLED ORAL at 08:18

## 2025-08-14 RX ADMIN — OXYBUTYNIN CHLORIDE 5 MG: 5 TABLET ORAL at 12:28

## 2025-08-14 RX ADMIN — ALBUTEROL SULFATE 2.5 MG: 2.5 SOLUTION RESPIRATORY (INHALATION) at 12:03

## 2025-08-14 RX ADMIN — ALBUTEROL SULFATE 2.5 MG: 2.5 SOLUTION RESPIRATORY (INHALATION) at 15:18

## 2025-08-14 RX ADMIN — ALBUTEROL SULFATE 2.5 MG: 2.5 SOLUTION RESPIRATORY (INHALATION) at 08:22

## 2025-08-14 RX ADMIN — POLYETHYLENE GLYCOL 3350 17 G: 17 POWDER, FOR SOLUTION ORAL at 08:18

## 2025-08-14 ASSESSMENT — COGNITIVE AND FUNCTIONAL STATUS - GENERAL
MOBILITY SCORE: 7
TURNING FROM BACK TO SIDE WHILE IN FLAT BAD: A LITTLE
STANDING UP FROM CHAIR USING ARMS: TOTAL
MOVING FROM LYING ON BACK TO SITTING ON SIDE OF FLAT BED WITH BEDRAILS: A LITTLE
TURNING FROM BACK TO SIDE WHILE IN FLAT BAD: TOTAL
WALKING IN HOSPITAL ROOM: TOTAL
MOVING TO AND FROM BED TO CHAIR: TOTAL
MOBILITY SCORE: 10
MOVING TO AND FROM BED TO CHAIR: TOTAL
WALKING IN HOSPITAL ROOM: TOTAL
STANDING UP FROM CHAIR USING ARMS: TOTAL
CLIMB 3 TO 5 STEPS WITH RAILING: TOTAL
MOVING FROM LYING ON BACK TO SITTING ON SIDE OF FLAT BED WITH BEDRAILS: A LOT
CLIMB 3 TO 5 STEPS WITH RAILING: TOTAL

## 2025-08-14 ASSESSMENT — PAIN SCALES - GENERAL
PAINLEVEL_OUTOF10: 0 - NO PAIN

## 2025-08-14 ASSESSMENT — PAIN - FUNCTIONAL ASSESSMENT
PAIN_FUNCTIONAL_ASSESSMENT: 0-10
PAIN_FUNCTIONAL_ASSESSMENT: 0-10

## 2025-08-14 ASSESSMENT — ACTIVITIES OF DAILY LIVING (ADL): ADL_ASSISTANCE: INDEPENDENT

## 2025-08-28 ENCOUNTER — OFFICE VISIT (OUTPATIENT)
Dept: ORTHOPEDIC SURGERY | Facility: CLINIC | Age: 25
End: 2025-08-28
Payer: COMMERCIAL

## 2025-08-28 DIAGNOSIS — G95.89 MYELOMALACIA (MULTI): Primary | ICD-10-CM

## 2025-08-28 DIAGNOSIS — G82.50 QUADRIPLEGIA AND QUADRIPARESIS (MULTI): ICD-10-CM

## 2025-08-28 DIAGNOSIS — S24.101A: ICD-10-CM

## 2025-08-28 PROCEDURE — 99024 POSTOP FOLLOW-UP VISIT: CPT | Performed by: ORTHOPAEDIC SURGERY

## 2025-08-28 PROCEDURE — 62368 ANALYZE SP INF PUMP W/REPROG: CPT | Performed by: ORTHOPAEDIC SURGERY

## 2025-08-28 PROCEDURE — 99212 OFFICE O/P EST SF 10 MIN: CPT | Mod: 25 | Performed by: ORTHOPAEDIC SURGERY

## 2025-09-11 ENCOUNTER — APPOINTMENT (OUTPATIENT)
Dept: ORTHOPEDIC SURGERY | Facility: CLINIC | Age: 25
End: 2025-09-11
Payer: COMMERCIAL

## 2025-09-24 ENCOUNTER — APPOINTMENT (OUTPATIENT)
Facility: CLINIC | Age: 25
End: 2025-09-24
Payer: COMMERCIAL

## 2025-10-08 ENCOUNTER — APPOINTMENT (OUTPATIENT)
Dept: UROLOGY | Facility: CLINIC | Age: 25
End: 2025-10-08
Payer: COMMERCIAL

## 2026-02-23 ENCOUNTER — APPOINTMENT (OUTPATIENT)
Dept: PEDIATRIC GASTROENTEROLOGY | Facility: CLINIC | Age: 26
End: 2026-02-23
Payer: COMMERCIAL

## 2026-07-22 ENCOUNTER — APPOINTMENT (OUTPATIENT)
Facility: CLINIC | Age: 26
End: 2026-07-22
Payer: COMMERCIAL

## (undated) DEVICE — DRESSING, GAUZE, PETROLATUM, PATCH, XEROFORM, 1 X 8 IN, STERILE

## (undated) DEVICE — COLLECTION BAG, FLUID, F/STERIS LOOP, STERILE

## (undated) DEVICE — TOWEL, SURGICAL, NEURO, O/R, 16 X 26, BLUE, STERILE

## (undated) DEVICE — SYRINGE, MONOJECT, LUER LOCK, 3 CC, LF

## (undated) DEVICE — TUBING, SUCTION, CONNECTING, STERILE 0.25 X 120 IN., LF

## (undated) DEVICE — TRAY, MINOR, SINGLE BASIN, STERILE

## (undated) DEVICE — NEEDLE, HYPODERMIC, REGULAR WALL, REGULAR BEVEL, 25 G X 5/8 IN

## (undated) DEVICE — DRESSING, NON-ADHERENT, TELFA, OUCHLESS, 3 X 8 IN, STERILE

## (undated) DEVICE — DRAPE PACK, MAJOR, OPTIMA, PEDIATRIC, 77 X 108 IN, DISPOSABLE, LF, STERILE

## (undated) DEVICE — Device

## (undated) DEVICE — SUTURE, SILK, 3-0, 18 IN SH/CR, BLACK

## (undated) DEVICE — DRESSING, MOISTURE VAPOR PERMEABLE, TEGADERM, 2 3/8 X 2 3/4IN, TRANSPARENT

## (undated) DEVICE — SYRINGE, LUER LOCK, 12ML

## (undated) DEVICE — SYRINGE, HYPODERMIC, TB, W/O NEEDLE, 1 CC, SLIP TIP

## (undated) DEVICE — NEEDLE, INJETAK ADJUSTABLE TIP

## (undated) DEVICE — BANDAGE, ELASTIC, MATRIX, SELF-CLOSURE, 6 IN X 5 YD, LF

## (undated) DEVICE — SUTURE, MONOCRYL, 4-0, 27 IN, PS-2, UNDYED

## (undated) DEVICE — DRESSING, TRANSPARENT, TEGADERM, 4 X 4-3/4 IN

## (undated) DEVICE — DRAPE, SHEET, LAPAROTOMY

## (undated) DEVICE — STAPLER, SKIN PROXIMATE, 35 WIDE

## (undated) DEVICE — APPLICATOR, CHLORAPREP, W/ORANGE TINT, 26ML

## (undated) DEVICE — DRAPE, SHEET, THREE QUARTER, FAN FOLD, 57 X 77 IN

## (undated) DEVICE — TROCAR, KII OPTICAL BLADELESS 5MM Z THREAD 100MM LNGTH

## (undated) DEVICE — SUTURE, MONOCRYL, 4-0, 18 IN, PS2, UNDYED

## (undated) DEVICE — DRAPE, TOWEL, STERI DRAPE, 17 X 11 IN, PLASTIC, STERILE

## (undated) DEVICE — GOWN, ASTOUND, L

## (undated) DEVICE — GUIDEWIRE, ULTRA TRACK, HYBRID, 0.035 IN X 150CM

## (undated) DEVICE — SYRINGE, 20 CC, LUER LOCK, MONOJECT, W/O CAP, LF

## (undated) DEVICE — COVER, CART, 45 X 27 X 48 IN, CLEAR

## (undated) DEVICE — MANIFOLD, 4 PORT NEPTUNE STANDARD

## (undated) DEVICE — NEEDLE, FILTER 19 G X 1 IN

## (undated) DEVICE — GLOVE, SURGICAL, PROTEXIS PI BLUE W/NEUTHERA, 6.5, PF, LF

## (undated) DEVICE — DRAPE, SHEET, LAPAROTOMY, INCISE, FENESTRATED, 105 X 115 IN, STERILE

## (undated) DEVICE — CATHETER, PASSER, 38CM

## (undated) DEVICE — GLOVE, SURGICAL, PROTEXIS,  6.5, PF, LATEX

## (undated) DEVICE — NEEDLE, HYPODERMIC, REGULAR WALL, REGULAR BEVEL, 26 G X 0.5 IN

## (undated) DEVICE — NEEDLE, INSUFFLATION, 13GAX120MM, DISP

## (undated) DEVICE — GLOVE, SURGICAL, PROTEXIS PI , 6.5, PF, LF

## (undated) DEVICE — ADHESIVE, SKIN, DERMABOND ADVANCED, 15CM, PEN-STYLE

## (undated) DEVICE — REST, HEAD, BAGEL, 9 IN

## (undated) DEVICE — DRESSING, ABDOMINAL PAD, CURITY, 8 X 10 IN

## (undated) DEVICE — SPONGE, GAUZE, XRAY DECT, 16 PLY, 4 X 4, W/MASTER DMT,STERILE

## (undated) DEVICE — DRESSING, SPONGE, GAUZE, CURITY, 4 X 4 IN, STERILE

## (undated) DEVICE — BOOT, SUTURE-AID, YELLOW, STERILE, LF

## (undated) DEVICE — COVER, EQUIPMENT, CAMERA, 5 X 96 IN, STERILE

## (undated) DEVICE — TAPE, SILK, DURAPORE, 3 IN X 10 YD, LF

## (undated) DEVICE — CATHETER, URETERAL, OPEN END, 5 FR, 70 CM

## (undated) DEVICE — CATHETER, URETHRAL, FOLEY, 2 WAY, CARSON, COUDE, BARDEX IC, 16 FR, 5 CC, SILVER,LATEX

## (undated) DEVICE — SYRINGE, 60 CC, IRRIGATION, PISTON, CATH TIP, W/LUER ADAPTER,DISP

## (undated) DEVICE — DRAPE, INCISE, ANTIMICROBIAL, IOBAN 2, LARGE, 17 X 23 IN, DISPOSABLE, STERILE

## (undated) DEVICE — STRIP, SKIN CLOSURE, STERI STRIP, REINFORCED, 0.5 X 4 IN

## (undated) DEVICE — TUBE SET, PNEUMOCLEAR, SMOKE EVACU, HIGH-FLOW